# Patient Record
Sex: FEMALE | Employment: FULL TIME | URBAN - METROPOLITAN AREA
[De-identification: names, ages, dates, MRNs, and addresses within clinical notes are randomized per-mention and may not be internally consistent; named-entity substitution may affect disease eponyms.]

---

## 2023-10-01 ENCOUNTER — HOSPITAL ENCOUNTER (INPATIENT)
Facility: HOSPITAL | Age: 38
LOS: 9 days | Discharge: HOME | DRG: 022 | End: 2023-10-10
Attending: STUDENT IN AN ORGANIZED HEALTH CARE EDUCATION/TRAINING PROGRAM | Admitting: NEUROLOGICAL SURGERY
Payer: COMMERCIAL

## 2023-10-01 ENCOUNTER — APPOINTMENT (OUTPATIENT)
Dept: RADIOLOGY | Facility: HOSPITAL | Age: 38
DRG: 022 | End: 2023-10-01
Payer: COMMERCIAL

## 2023-10-01 DIAGNOSIS — I68.8 OTHER CEREBROVASCULAR DISORDERS IN DISEASES CLASSIFIED ELSEWHERE: ICD-10-CM

## 2023-10-01 DIAGNOSIS — I67.1 CEREBRAL ANEURYSM (HHS-HCC): ICD-10-CM

## 2023-10-01 DIAGNOSIS — I60.8 OTHER NONTRAUMATIC SUBARACHNOID HEMORRHAGE (MULTI): ICD-10-CM

## 2023-10-01 DIAGNOSIS — I60.9 SUBARACHNOID HEMORRHAGE (MULTI): Primary | ICD-10-CM

## 2023-10-01 LAB
ALBUMIN SERPL BCP-MCNC: 4.1 G/DL (ref 3.4–5)
ALP SERPL-CCNC: 44 U/L (ref 33–110)
ALT SERPL W P-5'-P-CCNC: 13 U/L (ref 7–45)
ANION GAP BLDV CALCULATED.4IONS-SCNC: 8 MMOL/L (ref 10–25)
ANION GAP SERPL CALC-SCNC: 15 MMOL/L (ref 10–20)
APPEARANCE UR: ABNORMAL
APTT PPP: 31 SECONDS (ref 27–38)
AST SERPL W P-5'-P-CCNC: 15 U/L (ref 9–39)
B-HCG SERPL-ACNC: <3 MIU/ML
BASE EXCESS BLDV CALC-SCNC: 1.3 MMOL/L (ref -2–3)
BASOPHILS # BLD AUTO: 0.06 X10*3/UL (ref 0–0.1)
BASOPHILS NFR BLD AUTO: 0.4 %
BILIRUB SERPL-MCNC: 0.5 MG/DL (ref 0–1.2)
BILIRUB UR STRIP.AUTO-MCNC: NEGATIVE MG/DL
BNP SERPL-MCNC: 7 PG/ML (ref 0–99)
BODY TEMPERATURE: 37 DEGREES CELSIUS
BUN SERPL-MCNC: 12 MG/DL (ref 6–23)
CA-I BLDV-SCNC: 1.16 MMOL/L (ref 1.1–1.33)
CALCIUM SERPL-MCNC: 8.9 MG/DL (ref 8.6–10.6)
CARDIAC TROPONIN I PNL SERPL HS: 4 NG/L (ref 0–34)
CHLORIDE BLDV-SCNC: 112 MMOL/L (ref 98–107)
CHLORIDE SERPL-SCNC: 114 MMOL/L (ref 98–107)
CHOLEST SERPL-MCNC: 166 MG/DL (ref 0–199)
CHOLESTEROL/HDL RATIO: 4.9
CO2 SERPL-SCNC: 22 MMOL/L (ref 21–32)
COLOR UR: YELLOW
CREAT SERPL-MCNC: 0.75 MG/DL (ref 0.5–1.05)
EOSINOPHIL # BLD AUTO: 0.05 X10*3/UL (ref 0–0.7)
EOSINOPHIL NFR BLD AUTO: 0.4 %
ERYTHROCYTE [DISTWIDTH] IN BLOOD BY AUTOMATED COUNT: 12.8 % (ref 11.5–14.5)
EST. AVERAGE GLUCOSE BLD GHB EST-MCNC: 97 MG/DL
GFR SERPL CREATININE-BSD FRML MDRD: >90 ML/MIN/1.73M*2
GLUCOSE BLDV-MCNC: 104 MG/DL (ref 74–99)
GLUCOSE SERPL-MCNC: 93 MG/DL (ref 74–99)
GLUCOSE UR STRIP.AUTO-MCNC: NEGATIVE MG/DL
HBA1C MFR BLD: 5 %
HCO3 BLDV-SCNC: 25.9 MMOL/L (ref 22–26)
HCT VFR BLD AUTO: 39.8 % (ref 36–46)
HCT VFR BLD EST: 44 % (ref 36–46)
HDLC SERPL-MCNC: 34.1 MG/DL
HGB BLD-MCNC: 13.7 G/DL (ref 12–16)
HGB BLDV-MCNC: 14.5 G/DL (ref 12–16)
IMM GRANULOCYTES # BLD AUTO: 0.06 X10*3/UL (ref 0–0.7)
IMM GRANULOCYTES NFR BLD AUTO: 0.4 % (ref 0–0.9)
INR PPP: 1.2 (ref 0.9–1.1)
KETONES UR STRIP.AUTO-MCNC: ABNORMAL MG/DL
LACTATE BLDV-SCNC: 0.7 MMOL/L (ref 0.4–2)
LEUKOCYTE ESTERASE UR QL STRIP.AUTO: ABNORMAL
LYMPHOCYTES # BLD AUTO: 3.14 X10*3/UL (ref 1.2–4.8)
LYMPHOCYTES NFR BLD AUTO: 22.1 %
MAGNESIUM SERPL-MCNC: 1.9 MG/DL (ref 1.6–2.4)
MCH RBC QN AUTO: 30.9 PG (ref 26–34)
MCHC RBC AUTO-ENTMCNC: 34.4 G/DL (ref 32–36)
MCV RBC AUTO: 90 FL (ref 80–100)
MONOCYTES # BLD AUTO: 0.84 X10*3/UL (ref 0.1–1)
MONOCYTES NFR BLD AUTO: 5.9 %
MUCOUS THREADS #/AREA URNS AUTO: NORMAL /LPF
NEUTROPHILS # BLD AUTO: 10.05 X10*3/UL (ref 1.2–7.7)
NEUTROPHILS NFR BLD AUTO: 70.8 %
NITRITE UR QL STRIP.AUTO: NEGATIVE
NON-HDL CHOLESTEROL: 132 MG/DL (ref 0–149)
NRBC BLD-RTO: 0 /100 WBCS (ref 0–0)
OXYHGB MFR BLDV: 68.8 % (ref 45–75)
PCO2 BLDV: 40 MM HG (ref 41–51)
PH BLDV: 7.42 PH (ref 7.33–7.43)
PH UR STRIP.AUTO: 6 [PH]
PLATELET # BLD AUTO: 281 X10*3/UL (ref 150–450)
PMV BLD AUTO: 9.6 FL (ref 7.5–11.5)
PO2 BLDV: 43 MM HG (ref 35–45)
POTASSIUM BLDV-SCNC: 3.7 MMOL/L (ref 3.5–5.3)
POTASSIUM SERPL-SCNC: 3.8 MMOL/L (ref 3.5–5.3)
PROT SERPL-MCNC: 6.9 G/DL (ref 6.4–8.2)
PROT UR STRIP.AUTO-MCNC: NEGATIVE MG/DL
PROTHROMBIN TIME: 13.1 SECONDS (ref 9.8–12.8)
RBC # BLD AUTO: 4.44 X10*6/UL (ref 4–5.2)
RBC # UR STRIP.AUTO: NEGATIVE /UL
RBC #/AREA URNS AUTO: NORMAL /HPF
SAO2 % BLDV: 74 % (ref 45–75)
SODIUM BLDV-SCNC: 142 MMOL/L (ref 136–145)
SODIUM SERPL-SCNC: 147 MMOL/L (ref 136–145)
SP GR UR STRIP.AUTO: >1.06
SQUAMOUS #/AREA URNS AUTO: NORMAL /HPF
UROBILINOGEN UR STRIP.AUTO-MCNC: <2 MG/DL
WBC # BLD AUTO: 14.2 X10*3/UL (ref 4.4–11.3)
WBC #/AREA URNS AUTO: NORMAL /HPF

## 2023-10-01 PROCEDURE — 81001 URINALYSIS AUTO W/SCOPE: CPT | Performed by: STUDENT IN AN ORGANIZED HEALTH CARE EDUCATION/TRAINING PROGRAM

## 2023-10-01 PROCEDURE — 83880 ASSAY OF NATRIURETIC PEPTIDE: CPT | Performed by: STUDENT IN AN ORGANIZED HEALTH CARE EDUCATION/TRAINING PROGRAM

## 2023-10-01 PROCEDURE — 82465 ASSAY BLD/SERUM CHOLESTEROL: CPT | Performed by: STUDENT IN AN ORGANIZED HEALTH CARE EDUCATION/TRAINING PROGRAM

## 2023-10-01 PROCEDURE — 71045 X-RAY EXAM CHEST 1 VIEW: CPT | Performed by: RADIOLOGY

## 2023-10-01 PROCEDURE — 2500000005 HC RX 250 GENERAL PHARMACY W/O HCPCS

## 2023-10-01 PROCEDURE — G1004 CDSM NDSC: HCPCS

## 2023-10-01 PROCEDURE — 83036 HEMOGLOBIN GLYCOSYLATED A1C: CPT | Performed by: STUDENT IN AN ORGANIZED HEALTH CARE EDUCATION/TRAINING PROGRAM

## 2023-10-01 PROCEDURE — 85025 COMPLETE CBC W/AUTO DIFF WBC: CPT | Performed by: STUDENT IN AN ORGANIZED HEALTH CARE EDUCATION/TRAINING PROGRAM

## 2023-10-01 PROCEDURE — 80053 COMPREHEN METABOLIC PANEL: CPT

## 2023-10-01 PROCEDURE — 99284 EMERGENCY DEPT VISIT MOD MDM: CPT | Performed by: STUDENT IN AN ORGANIZED HEALTH CARE EDUCATION/TRAINING PROGRAM

## 2023-10-01 PROCEDURE — 2500000004 HC RX 250 GENERAL PHARMACY W/ HCPCS (ALT 636 FOR OP/ED): Performed by: STUDENT IN AN ORGANIZED HEALTH CARE EDUCATION/TRAINING PROGRAM

## 2023-10-01 PROCEDURE — 83735 ASSAY OF MAGNESIUM: CPT | Performed by: STUDENT IN AN ORGANIZED HEALTH CARE EDUCATION/TRAINING PROGRAM

## 2023-10-01 PROCEDURE — 36415 COLL VENOUS BLD VENIPUNCTURE: CPT | Performed by: STUDENT IN AN ORGANIZED HEALTH CARE EDUCATION/TRAINING PROGRAM

## 2023-10-01 PROCEDURE — 87086 URINE CULTURE/COLONY COUNT: CPT | Performed by: STUDENT IN AN ORGANIZED HEALTH CARE EDUCATION/TRAINING PROGRAM

## 2023-10-01 PROCEDURE — 85610 PROTHROMBIN TIME: CPT | Performed by: STUDENT IN AN ORGANIZED HEALTH CARE EDUCATION/TRAINING PROGRAM

## 2023-10-01 PROCEDURE — 81003 URINALYSIS AUTO W/O SCOPE: CPT | Performed by: STUDENT IN AN ORGANIZED HEALTH CARE EDUCATION/TRAINING PROGRAM

## 2023-10-01 PROCEDURE — 99285 EMERGENCY DEPT VISIT HI MDM: CPT | Mod: 25 | Performed by: STUDENT IN AN ORGANIZED HEALTH CARE EDUCATION/TRAINING PROGRAM

## 2023-10-01 PROCEDURE — 83605 ASSAY OF LACTIC ACID: CPT

## 2023-10-01 PROCEDURE — 99223 1ST HOSP IP/OBS HIGH 75: CPT | Performed by: NEUROLOGICAL SURGERY

## 2023-10-01 PROCEDURE — 86850 RBC ANTIBODY SCREEN: CPT | Performed by: STUDENT IN AN ORGANIZED HEALTH CARE EDUCATION/TRAINING PROGRAM

## 2023-10-01 PROCEDURE — 82947 ASSAY GLUCOSE BLOOD QUANT: CPT

## 2023-10-01 PROCEDURE — 85730 THROMBOPLASTIN TIME PARTIAL: CPT | Performed by: STUDENT IN AN ORGANIZED HEALTH CARE EDUCATION/TRAINING PROGRAM

## 2023-10-01 PROCEDURE — 82947 ASSAY GLUCOSE BLOOD QUANT: CPT | Performed by: STUDENT IN AN ORGANIZED HEALTH CARE EDUCATION/TRAINING PROGRAM

## 2023-10-01 PROCEDURE — 84702 CHORIONIC GONADOTROPIN TEST: CPT | Performed by: STUDENT IN AN ORGANIZED HEALTH CARE EDUCATION/TRAINING PROGRAM

## 2023-10-01 PROCEDURE — 2500000001 HC RX 250 WO HCPCS SELF ADMINISTERED DRUGS (ALT 637 FOR MEDICARE OP): Performed by: STUDENT IN AN ORGANIZED HEALTH CARE EDUCATION/TRAINING PROGRAM

## 2023-10-01 PROCEDURE — 84484 ASSAY OF TROPONIN QUANT: CPT | Performed by: STUDENT IN AN ORGANIZED HEALTH CARE EDUCATION/TRAINING PROGRAM

## 2023-10-01 PROCEDURE — 2550000001 HC RX 255 CONTRASTS: Performed by: STUDENT IN AN ORGANIZED HEALTH CARE EDUCATION/TRAINING PROGRAM

## 2023-10-01 PROCEDURE — 70498 CT ANGIOGRAPHY NECK: CPT | Performed by: STUDENT IN AN ORGANIZED HEALTH CARE EDUCATION/TRAINING PROGRAM

## 2023-10-01 PROCEDURE — 99285 EMERGENCY DEPT VISIT HI MDM: CPT | Performed by: STUDENT IN AN ORGANIZED HEALTH CARE EDUCATION/TRAINING PROGRAM

## 2023-10-01 PROCEDURE — 2020000001 HC ICU ROOM DAILY

## 2023-10-01 PROCEDURE — 70496 CT ANGIOGRAPHY HEAD: CPT | Performed by: STUDENT IN AN ORGANIZED HEALTH CARE EDUCATION/TRAINING PROGRAM

## 2023-10-01 RX ORDER — HYDRALAZINE HYDROCHLORIDE 20 MG/ML
10 INJECTION INTRAMUSCULAR; INTRAVENOUS
Status: DISCONTINUED | OUTPATIENT
Start: 2023-10-01 | End: 2023-10-02

## 2023-10-01 RX ORDER — DEXAMETHASONE SODIUM PHOSPHATE 4 MG/ML
2 INJECTION, SOLUTION INTRA-ARTICULAR; INTRALESIONAL; INTRAMUSCULAR; INTRAVENOUS; SOFT TISSUE EVERY 8 HOURS SCHEDULED
Status: COMPLETED | OUTPATIENT
Start: 2023-10-01 | End: 2023-10-04

## 2023-10-01 RX ORDER — AMOXICILLIN 250 MG
2 CAPSULE ORAL 2 TIMES DAILY
Status: DISCONTINUED | OUTPATIENT
Start: 2023-10-01 | End: 2023-10-10

## 2023-10-01 RX ORDER — POLYETHYLENE GLYCOL 3350 17 G/17G
17 POWDER, FOR SOLUTION ORAL DAILY
Status: DISCONTINUED | OUTPATIENT
Start: 2023-10-01 | End: 2023-10-10

## 2023-10-01 RX ORDER — NICARDIPINE HYDROCHLORIDE 0.2 MG/ML
1-15 INJECTION INTRAVENOUS CONTINUOUS PRN
Status: DISCONTINUED | OUTPATIENT
Start: 2023-10-01 | End: 2023-10-02

## 2023-10-01 RX ORDER — OXYCODONE HYDROCHLORIDE 5 MG/1
5 TABLET ORAL EVERY 6 HOURS PRN
Status: DISCONTINUED | OUTPATIENT
Start: 2023-10-01 | End: 2023-10-08

## 2023-10-01 RX ORDER — ACETAMINOPHEN 325 MG/1
650 TABLET ORAL 4 TIMES DAILY
Status: DISCONTINUED | OUTPATIENT
Start: 2023-10-01 | End: 2023-10-08

## 2023-10-01 RX ORDER — LIDOCAINE HYDROCHLORIDE 10 MG/ML
INJECTION INFILTRATION; PERINEURAL
Status: COMPLETED
Start: 2023-10-01 | End: 2023-10-01

## 2023-10-01 RX ORDER — NIMODIPINE 30 MG/1
60 CAPSULE, LIQUID FILLED ORAL EVERY 4 HOURS
Status: DISCONTINUED | OUTPATIENT
Start: 2023-10-01 | End: 2023-10-10 | Stop reason: HOSPADM

## 2023-10-01 RX ORDER — LEVETIRACETAM 5 MG/ML
500 INJECTION INTRAVASCULAR EVERY 12 HOURS
Status: DISCONTINUED | OUTPATIENT
Start: 2023-10-01 | End: 2023-10-02

## 2023-10-01 RX ORDER — TRANEXAMIC ACID 10 MG/ML
1000 INJECTION, SOLUTION INTRAVENOUS EVERY 8 HOURS
Status: COMPLETED | OUTPATIENT
Start: 2023-10-01 | End: 2023-10-02

## 2023-10-01 RX ORDER — LABETALOL HYDROCHLORIDE 5 MG/ML
10 INJECTION, SOLUTION INTRAVENOUS EVERY 10 MIN PRN
Status: DISCONTINUED | OUTPATIENT
Start: 2023-10-01 | End: 2023-10-02

## 2023-10-01 RX ADMIN — LEVETIRACETAM 500 MG: 5 INJECTION INTRAVENOUS at 17:15

## 2023-10-01 RX ADMIN — NIMODIPINE 60 MG: 30 CAPSULE, LIQUID FILLED ORAL at 23:08

## 2023-10-01 RX ADMIN — TRANEXAMIC ACID 1000 MG: 10 INJECTION, SOLUTION INTRAVENOUS at 20:04

## 2023-10-01 RX ADMIN — ACETAMINOPHEN 650 MG: 325 TABLET, FILM COATED ORAL at 17:35

## 2023-10-01 RX ADMIN — DEXAMETHASONE SODIUM PHOSPHATE 2 MG: 4 INJECTION, SOLUTION INTRAMUSCULAR; INTRAVENOUS at 22:15

## 2023-10-01 RX ADMIN — IOHEXOL 100 ML: 350 INJECTION, SOLUTION INTRAVENOUS at 14:52

## 2023-10-01 RX ADMIN — LIDOCAINE HYDROCHLORIDE: 10 INJECTION, SOLUTION INFILTRATION; PERINEURAL at 20:15

## 2023-10-01 RX ADMIN — NIMODIPINE 60 MG: 60 SOLUTION ORAL at 17:00

## 2023-10-01 RX ADMIN — ACETAMINOPHEN 650 MG: 325 TABLET, FILM COATED ORAL at 20:30

## 2023-10-01 RX ADMIN — NIMODIPINE 60 MG: 30 CAPSULE, LIQUID FILLED ORAL at 20:04

## 2023-10-01 RX ADMIN — DEXAMETHASONE SODIUM PHOSPHATE 2 MG: 4 INJECTION, SOLUTION INTRAMUSCULAR; INTRAVENOUS at 17:16

## 2023-10-01 RX ADMIN — OXYCODONE HYDROCHLORIDE 5 MG: 5 TABLET ORAL at 20:31

## 2023-10-01 RX ADMIN — SENNOSIDES AND DOCUSATE SODIUM 2 TABLET: 50; 8.6 TABLET ORAL at 20:32

## 2023-10-01 SDOH — SOCIAL STABILITY: SOCIAL INSECURITY: ARE YOU OR HAVE YOU BEEN THREATENED OR ABUSED PHYSICALLY, EMOTIONALLY, OR SEXUALLY BY ANYONE?: NO

## 2023-10-01 SDOH — SOCIAL STABILITY: SOCIAL INSECURITY: ABUSE: ADULT

## 2023-10-01 ASSESSMENT — COLUMBIA-SUICIDE SEVERITY RATING SCALE - C-SSRS
2. HAVE YOU ACTUALLY HAD ANY THOUGHTS OF KILLING YOURSELF?: NO
6. HAVE YOU EVER DONE ANYTHING, STARTED TO DO ANYTHING, OR PREPARED TO DO ANYTHING TO END YOUR LIFE?: NO
1. IN THE PAST MONTH, HAVE YOU WISHED YOU WERE DEAD OR WISHED YOU COULD GO TO SLEEP AND NOT WAKE UP?: NO

## 2023-10-01 ASSESSMENT — PAIN SCALES - GENERAL
PAINLEVEL_OUTOF10: 0 - NO PAIN
PAINLEVEL_OUTOF10: 10 - WORST POSSIBLE PAIN
PAINLEVEL_OUTOF10: 7
PAINLEVEL_OUTOF10: 7
PAINLEVEL_OUTOF10: 3
PAINLEVEL_OUTOF10: 10 - WORST POSSIBLE PAIN

## 2023-10-01 ASSESSMENT — PAIN SCALES - PAIN ASSESSMENT IN ADVANCED DEMENTIA (PAINAD)
CONSOLABILITY: DISTRACTED OR REASSURED BY VOICE/TOUCH
NEGVOCALIZATION: OCCASIONAL MOAN/GROAN, LOW SPEECH, NEGATIVE/DISAPPROVING QUALITY
FACIALEXPRESSION: SAD, FRIGHTENED, FROWN
CONSOLABILITY: DISTRACTED OR REASSURED BY VOICE/TOUCH
TOTALSCORE: 4
BODYLANGUAGE: TENSE, DISTRESSED PACING, FIDGETING
FACIALEXPRESSION: SAD, FRIGHTENED, FROWN
BREATHING: NORMAL
BODYLANGUAGE: TENSE, DISTRESSED PACING, FIDGETING
NEGVOCALIZATION: OCCASIONAL MOAN/GROAN, LOW SPEECH, NEGATIVE/DISAPPROVING QUALITY

## 2023-10-01 ASSESSMENT — ACTIVITIES OF DAILY LIVING (ADL)
DRESSING YOURSELF: INDEPENDENT
HEARING - LEFT EAR: FUNCTIONAL
ADEQUATE_TO_COMPLETE_ADL: YES
HEARING - RIGHT EAR: FUNCTIONAL
TOILETING: INDEPENDENT
PATIENT'S MEMORY ADEQUATE TO SAFELY COMPLETE DAILY ACTIVITIES?: YES
JUDGMENT_ADEQUATE_SAFELY_COMPLETE_DAILY_ACTIVITIES: YES
BATHING: INDEPENDENT
GROOMING: INDEPENDENT
WALKS IN HOME: INDEPENDENT
FEEDING YOURSELF: INDEPENDENT

## 2023-10-01 ASSESSMENT — PAIN - FUNCTIONAL ASSESSMENT
PAIN_FUNCTIONAL_ASSESSMENT: 0-10

## 2023-10-01 ASSESSMENT — PAIN SCALES - WONG BAKER
WONGBAKER_NUMERICALRESPONSE: HURTS EVEN MORE
WONGBAKER_NUMERICALRESPONSE: HURTS WORST

## 2023-10-01 ASSESSMENT — COGNITIVE AND FUNCTIONAL STATUS - GENERAL: PATIENT BASELINE BEDBOUND: NO

## 2023-10-01 ASSESSMENT — PAIN DESCRIPTION - PROGRESSION: CLINICAL_PROGRESSION: NOT CHANGED

## 2023-10-01 ASSESSMENT — PAIN DESCRIPTION - DESCRIPTORS: DESCRIPTORS: ACHING

## 2023-10-01 NOTE — Clinical Note
Cerebral angiogram performed. Access via right groin. Closure with 5 fr mynx. Hemostasis achieved. 2x2 and tegaderm placed. Dressing clean, dry, and intact. No hematoma. Pt received 1 mg versed and 50 mcg fentanyl IVP. Pt to keep right leg flat for 3 hours post deployment time. VSS. Pt transferred to NSU, NSU RN received report.

## 2023-10-01 NOTE — H&P
"History Of Present Illness  Reshma Landers is a 37 y.o. female presenting with 2 days HA and neck stiffness.     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    Past Medical History  She has no past medical history on file.    Surgical History  She has no past surgical history on file.     Social History  She has no history on file for tobacco use, alcohol use, and drug use.     Allergies  Bactrim [sulfamethoxazole-trimethoprim]    Medications  (Not in a hospital admission)      Review of Systems   All other systems reviewed and are negative.       Neurological Exam  Mental Status  Alert.    Cranial Nerves  CN III, IV, VI: Extraocular movements intact bilaterally. Pupils equal round and reactive to light bilaterally.      Awake, Ox3  Fcx4 5/5  SILT    Physical Exam  Constitutional:       Appearance: Normal appearance.   HENT:      Mouth/Throat:      Mouth: Mucous membranes are moist.   Eyes:      Extraocular Movements: Extraocular movements intact.      Pupils: Pupils are equal, round, and reactive to light.   Cardiovascular:      Rate and Rhythm: Normal rate and regular rhythm.   Pulmonary:      Breath sounds: Normal breath sounds.   Abdominal:      General: Bowel sounds are normal.      Palpations: Abdomen is soft.   Musculoskeletal:         General: Normal range of motion.   Skin:     General: Skin is warm and dry.   Neurological:      Mental Status: She is alert.   Psychiatric:         Mood and Affect: Mood normal.         Behavior: Behavior normal.       Last Recorded Vitals  Blood pressure 122/78, pulse 80, temperature 36.8 °C (98.2 °F), resp. rate 16, height 1.6 m (5' 3\"), weight 61.2 kg (135 lb), SpO2 98 %.     SCORES    GCS:   - Motor: 6 - Follows simple motor commands  - Verbal: 5 - Alert and oriented  - Eyes: 4 - Opens eyes on own  - TOTAL: 15    2 - Moderate to severe headache, nuchal rigidity, no neurological deficit other than cranial " nerve palsy  mFS:  1 - thin SAH, no IVH    Assessment/Plan     Patient is a 37 year old female with h/o smoking, uncle  2/2 aneurysmal hemorrhage p/w 2 days headache and nuchal rigidity, CTH basal/Sylvian SAH, CTA 6mm basilar tip aneurysm.    NSU  SBP <140  Angio for coiling 10/2  TXA  TCDs  Nimodipine  Dex 2q8, keppra  CBC, RFP, coag, T+S, CXR, UA, EKG  SCDs    Delbert Brannon MD    Note authored by resident on neurosurgery team, with all questions or to contact team please page at [phone]

## 2023-10-01 NOTE — ED TRIAGE NOTES
Pt transferred from [facility] for subarachnoid Hemorrhage. Pt has had a headache since Friday that has gotten progressively worse.  Pain woke her from her sleep at 4am. Started on Nicardipine at OSH. Given 1L NS, 500 Keppra, 2mg decadron pta.

## 2023-10-01 NOTE — ED NOTES
"VSS. Patient states her headache is at a minimal and she doesn't want medication at this time. Able to ambulate with a standby assist and does not feel dizzy. States she \"feels ok\" at the moment. Patient and patient's visitor updated on plan of care.     Allison Feliz RN  10/01/23 4807    "

## 2023-10-01 NOTE — ED NOTES
Patient to the floor with all belongings accompanied by RN and medic     Allison Feliz RN  10/01/23 7904

## 2023-10-02 ENCOUNTER — APPOINTMENT (OUTPATIENT)
Dept: VASCULAR MEDICINE | Facility: HOSPITAL | Age: 38
DRG: 022 | End: 2023-10-02
Payer: COMMERCIAL

## 2023-10-02 ENCOUNTER — ANESTHESIA EVENT (OUTPATIENT)
Dept: RADIOLOGY | Facility: HOSPITAL | Age: 38
DRG: 022 | End: 2023-10-02
Payer: COMMERCIAL

## 2023-10-02 ENCOUNTER — ANESTHESIA (OUTPATIENT)
Dept: RADIOLOGY | Facility: HOSPITAL | Age: 38
DRG: 022 | End: 2023-10-02
Payer: COMMERCIAL

## 2023-10-02 ENCOUNTER — HOSPITAL ENCOUNTER (OUTPATIENT)
Dept: RADIOLOGY | Facility: HOSPITAL | Age: 38
Discharge: HOME | DRG: 022 | End: 2023-10-02
Payer: COMMERCIAL

## 2023-10-02 PROBLEM — B99.9 INFECTION REQUIRING CONTACT ISOLATION PRECAUTIONS: Status: ACTIVE | Noted: 2023-10-02

## 2023-10-02 PROBLEM — R56.9 SEIZURES (MULTI): Status: ACTIVE | Noted: 2023-10-02

## 2023-10-02 PROBLEM — Z86.69 HISTORY OF MIGRAINE HEADACHES: Status: ACTIVE | Noted: 2023-10-02

## 2023-10-02 PROBLEM — Z21 HIV (HUMAN IMMUNODEFICIENCY VIRUS INFECTION): Status: ACTIVE | Noted: 2023-10-02

## 2023-10-02 PROBLEM — I67.1 CEREBRAL ANEURYSM (HHS-HCC): Status: ACTIVE | Noted: 2023-10-02

## 2023-10-02 PROBLEM — I77.9 CAROTID ARTERY DISEASE (CMS-HCC): Status: ACTIVE | Noted: 2023-10-02

## 2023-10-02 LAB
ABO GROUP (TYPE) IN BLOOD: NORMAL
ABO GROUP (TYPE) IN BLOOD: NORMAL
ALBUMIN SERPL BCP-MCNC: 4.2 G/DL (ref 3.4–5)
ANION GAP SERPL CALC-SCNC: 15 MMOL/L (ref 10–20)
ANTIBODY SCREEN: NORMAL
APTT PPP: 30 SECONDS (ref 27–38)
BUN SERPL-MCNC: 15 MG/DL (ref 6–23)
CALCIUM SERPL-MCNC: 9.6 MG/DL (ref 8.6–10.6)
CHLORIDE SERPL-SCNC: 110 MMOL/L (ref 98–107)
CO2 SERPL-SCNC: 23 MMOL/L (ref 21–32)
CREAT SERPL-MCNC: 0.67 MG/DL (ref 0.5–1.05)
ERYTHROCYTE [DISTWIDTH] IN BLOOD BY AUTOMATED COUNT: 12.8 % (ref 11.5–14.5)
GFR SERPL CREATININE-BSD FRML MDRD: >90 ML/MIN/1.73M*2
GLUCOSE BLD MANUAL STRIP-MCNC: 77 MG/DL (ref 74–99)
GLUCOSE SERPL-MCNC: 103 MG/DL (ref 74–99)
HCT VFR BLD AUTO: 38.9 % (ref 36–46)
HGB BLD-MCNC: 12.9 G/DL (ref 12–16)
INR PPP: 1.1 (ref 0.9–1.1)
MCH RBC QN AUTO: 30.5 PG (ref 26–34)
MCHC RBC AUTO-ENTMCNC: 33.2 G/DL (ref 32–36)
MCV RBC AUTO: 92 FL (ref 80–100)
NRBC BLD-RTO: 0 /100 WBCS (ref 0–0)
PHOSPHATE SERPL-MCNC: 4.4 MG/DL (ref 2.5–4.9)
PLATELET # BLD AUTO: 296 X10*3/UL (ref 150–450)
PMV BLD AUTO: 9.4 FL (ref 7.5–11.5)
POTASSIUM SERPL-SCNC: 4.4 MMOL/L (ref 3.5–5.3)
PROTHROMBIN TIME: 12.8 SECONDS (ref 9.8–12.8)
RBC # BLD AUTO: 4.23 X10*6/UL (ref 4–5.2)
RH FACTOR (ANTIGEN D): NORMAL
RH FACTOR (ANTIGEN D): NORMAL
SODIUM SERPL-SCNC: 144 MMOL/L (ref 136–145)
WBC # BLD AUTO: 14.3 X10*3/UL (ref 4.4–11.3)

## 2023-10-02 PROCEDURE — 76377 3D RENDER W/INTRP POSTPROCES: CPT

## 2023-10-02 PROCEDURE — C1769 GUIDE WIRE: HCPCS

## 2023-10-02 PROCEDURE — A36245 PR PLACE CATH SELECT ART,ABD/PEL: Performed by: ANESTHESIOLOGY

## 2023-10-02 PROCEDURE — 2720000007 HC OR 272 NO HCPCS

## 2023-10-02 PROCEDURE — 2020000001 HC ICU ROOM DAILY

## 2023-10-02 PROCEDURE — 85347 COAGULATION TIME ACTIVATED: CPT

## 2023-10-02 PROCEDURE — 85027 COMPLETE CBC AUTOMATED: CPT | Performed by: STUDENT IN AN ORGANIZED HEALTH CARE EDUCATION/TRAINING PROGRAM

## 2023-10-02 PROCEDURE — 2500000004 HC RX 250 GENERAL PHARMACY W/ HCPCS (ALT 636 FOR OP/ED): Performed by: STUDENT IN AN ORGANIZED HEALTH CARE EDUCATION/TRAINING PROGRAM

## 2023-10-02 PROCEDURE — 93886 INTRACRANIAL COMPLETE STUDY: CPT | Performed by: PSYCHIATRY & NEUROLOGY

## 2023-10-02 PROCEDURE — 2500000001 HC RX 250 WO HCPCS SELF ADMINISTERED DRUGS (ALT 637 FOR MEDICARE OP): Performed by: STUDENT IN AN ORGANIZED HEALTH CARE EDUCATION/TRAINING PROGRAM

## 2023-10-02 PROCEDURE — ANE737 PERIPHERAL IV: Performed by: NURSE ANESTHETIST, CERTIFIED REGISTERED

## 2023-10-02 PROCEDURE — 75710 ARTERY X-RAYS ARM/LEG: CPT

## 2023-10-02 PROCEDURE — 2580000001 HC RX 258 IV SOLUTIONS: Performed by: NURSE ANESTHETIST, CERTIFIED REGISTERED

## 2023-10-02 PROCEDURE — 82947 ASSAY GLUCOSE BLOOD QUANT: CPT

## 2023-10-02 PROCEDURE — 85610 PROTHROMBIN TIME: CPT | Performed by: STUDENT IN AN ORGANIZED HEALTH CARE EDUCATION/TRAINING PROGRAM

## 2023-10-02 PROCEDURE — A36245 PR PLACE CATH SELECT ART,ABD/PEL: Performed by: NURSE ANESTHETIST, CERTIFIED REGISTERED

## 2023-10-02 PROCEDURE — 3700000002 HC GENERAL ANESTHESIA TIME - EACH INCREMENTAL 1 MINUTE

## 2023-10-02 PROCEDURE — 61624 TCAT PERM OCCLS/EMBOLJ CNS: CPT | Performed by: NEUROLOGICAL SURGERY

## 2023-10-02 PROCEDURE — 36226 PLACE CATH VERTEBRAL ART: CPT

## 2023-10-02 PROCEDURE — C1889 IMPLANT/INSERT DEVICE, NOC: HCPCS

## 2023-10-02 PROCEDURE — C1760 CLOSURE DEV, VASC: HCPCS

## 2023-10-02 PROCEDURE — 2500000004 HC RX 250 GENERAL PHARMACY W/ HCPCS (ALT 636 FOR OP/ED): Performed by: NURSE ANESTHETIST, CERTIFIED REGISTERED

## 2023-10-02 PROCEDURE — 76377 3D RENDER W/INTRP POSTPROCES: CPT | Performed by: NEUROLOGICAL SURGERY

## 2023-10-02 PROCEDURE — 03LG3DZ OCCLUSION OF INTRACRANIAL ARTERY WITH INTRALUMINAL DEVICE, PERCUTANEOUS APPROACH: ICD-10-PCS | Performed by: NEUROLOGICAL SURGERY

## 2023-10-02 PROCEDURE — ANE737: Performed by: NURSE ANESTHETIST, CERTIFIED REGISTERED

## 2023-10-02 PROCEDURE — C1887 CATHETER, GUIDING: HCPCS

## 2023-10-02 PROCEDURE — 36620 INSERTION CATHETER ARTERY: CPT | Performed by: NURSE ANESTHETIST, CERTIFIED REGISTERED

## 2023-10-02 PROCEDURE — 75894 X-RAYS TRANSCATH THERAPY: CPT | Performed by: NEUROLOGICAL SURGERY

## 2023-10-02 PROCEDURE — 93886 INTRACRANIAL COMPLETE STUDY: CPT

## 2023-10-02 PROCEDURE — 75898 FOLLOW-UP ANGIOGRAPHY: CPT | Performed by: NEUROLOGICAL SURGERY

## 2023-10-02 PROCEDURE — 80069 RENAL FUNCTION PANEL: CPT | Performed by: STUDENT IN AN ORGANIZED HEALTH CARE EDUCATION/TRAINING PROGRAM

## 2023-10-02 PROCEDURE — 99291 CRITICAL CARE FIRST HOUR: CPT | Performed by: NEUROLOGICAL SURGERY

## 2023-10-02 PROCEDURE — 2500000005 HC RX 250 GENERAL PHARMACY W/O HCPCS: Performed by: NURSE ANESTHETIST, CERTIFIED REGISTERED

## 2023-10-02 PROCEDURE — 3700000001 HC GENERAL ANESTHESIA TIME - INITIAL BASE CHARGE

## 2023-10-02 PROCEDURE — C2628 CATHETER, OCCLUSION: HCPCS

## 2023-10-02 PROCEDURE — 36415 COLL VENOUS BLD VENIPUNCTURE: CPT | Performed by: NEUROLOGICAL SURGERY

## 2023-10-02 PROCEDURE — 2780000003 HC OR 278 NO HCPCS

## 2023-10-02 PROCEDURE — 75710 ARTERY X-RAYS ARM/LEG: CPT | Mod: RT

## 2023-10-02 PROCEDURE — 36415 COLL VENOUS BLD VENIPUNCTURE: CPT | Performed by: STUDENT IN AN ORGANIZED HEALTH CARE EDUCATION/TRAINING PROGRAM

## 2023-10-02 RX ORDER — HYDRALAZINE HYDROCHLORIDE 20 MG/ML
10 INJECTION INTRAMUSCULAR; INTRAVENOUS EVERY 4 HOURS PRN
Status: DISCONTINUED | OUTPATIENT
Start: 2023-10-02 | End: 2023-10-10

## 2023-10-02 RX ORDER — PROPOFOL 10 MG/ML
INJECTION, EMULSION INTRAVENOUS AS NEEDED
Status: DISCONTINUED | OUTPATIENT
Start: 2023-10-02 | End: 2023-10-02

## 2023-10-02 RX ORDER — MAGNESIUM SULFATE HEPTAHYDRATE 40 MG/ML
2 INJECTION, SOLUTION INTRAVENOUS EVERY 6 HOURS PRN
Status: DISCONTINUED | OUTPATIENT
Start: 2023-10-02 | End: 2023-10-10

## 2023-10-02 RX ORDER — FENTANYL CITRATE 50 UG/ML
INJECTION, SOLUTION INTRAMUSCULAR; INTRAVENOUS AS NEEDED
Status: DISCONTINUED | OUTPATIENT
Start: 2023-10-02 | End: 2023-10-02

## 2023-10-02 RX ORDER — ROCURONIUM BROMIDE 10 MG/ML
INJECTION, SOLUTION INTRAVENOUS AS NEEDED
Status: DISCONTINUED | OUTPATIENT
Start: 2023-10-02 | End: 2023-10-02

## 2023-10-02 RX ORDER — POTASSIUM CHLORIDE 20 MEQ/1
20 TABLET, EXTENDED RELEASE ORAL EVERY 6 HOURS PRN
Status: DISCONTINUED | OUTPATIENT
Start: 2023-10-02 | End: 2023-10-10

## 2023-10-02 RX ORDER — POTASSIUM CHLORIDE 1.5 G/1.58G
40 POWDER, FOR SOLUTION ORAL EVERY 6 HOURS PRN
Status: DISCONTINUED | OUTPATIENT
Start: 2023-10-02 | End: 2023-10-10

## 2023-10-02 RX ORDER — POTASSIUM CHLORIDE 20 MEQ/1
40 TABLET, EXTENDED RELEASE ORAL EVERY 6 HOURS PRN
Status: DISCONTINUED | OUTPATIENT
Start: 2023-10-02 | End: 2023-10-10

## 2023-10-02 RX ORDER — LIDOCAINE HYDROCHLORIDE 20 MG/ML
INJECTION, SOLUTION INFILTRATION; PERINEURAL AS NEEDED
Status: DISCONTINUED | OUTPATIENT
Start: 2023-10-02 | End: 2023-10-02

## 2023-10-02 RX ORDER — NITROGLYCERIN 20 MG/100ML
INJECTION INTRAVENOUS AS NEEDED
Status: DISCONTINUED | OUTPATIENT
Start: 2023-10-02 | End: 2023-10-02

## 2023-10-02 RX ORDER — DEXTROSE MONOHYDRATE 100 MG/ML
0.3 INJECTION, SOLUTION INTRAVENOUS ONCE AS NEEDED
Status: DISCONTINUED | OUTPATIENT
Start: 2023-10-02 | End: 2023-10-10

## 2023-10-02 RX ORDER — LABETALOL HYDROCHLORIDE 5 MG/ML
20 INJECTION, SOLUTION INTRAVENOUS EVERY 10 MIN PRN
Status: DISCONTINUED | OUTPATIENT
Start: 2023-10-02 | End: 2023-10-10

## 2023-10-02 RX ORDER — POTASSIUM CHLORIDE 14.9 MG/ML
20 INJECTION INTRAVENOUS EVERY 6 HOURS PRN
Status: DISCONTINUED | OUTPATIENT
Start: 2023-10-02 | End: 2023-10-10

## 2023-10-02 RX ORDER — INSULIN LISPRO 100 [IU]/ML
0-10 INJECTION, SOLUTION INTRAVENOUS; SUBCUTANEOUS
Status: DISCONTINUED | OUTPATIENT
Start: 2023-10-02 | End: 2023-10-06

## 2023-10-02 RX ORDER — PHENYLEPHRINE HCL IN 0.9% NACL 0.4MG/10ML
SYRINGE (ML) INTRAVENOUS AS NEEDED
Status: DISCONTINUED | OUTPATIENT
Start: 2023-10-02 | End: 2023-10-02

## 2023-10-02 RX ORDER — HEPARIN SODIUM 1000 [USP'U]/ML
INJECTION, SOLUTION INTRAVENOUS; SUBCUTANEOUS AS NEEDED
Status: DISCONTINUED | OUTPATIENT
Start: 2023-10-02 | End: 2023-10-02

## 2023-10-02 RX ORDER — POTASSIUM CHLORIDE 1.5 G/1.58G
20 POWDER, FOR SOLUTION ORAL EVERY 6 HOURS PRN
Status: DISCONTINUED | OUTPATIENT
Start: 2023-10-02 | End: 2023-10-10

## 2023-10-02 RX ORDER — PANTOPRAZOLE SODIUM 20 MG/1
20 TABLET, DELAYED RELEASE ORAL
Status: DISCONTINUED | OUTPATIENT
Start: 2023-10-03 | End: 2023-10-05

## 2023-10-02 RX ORDER — DEXTROSE 50 % IN WATER (D50W) INTRAVENOUS SYRINGE
25
Status: DISCONTINUED | OUTPATIENT
Start: 2023-10-02 | End: 2023-10-10

## 2023-10-02 RX ORDER — ONDANSETRON HYDROCHLORIDE 2 MG/ML
INJECTION, SOLUTION INTRAVENOUS AS NEEDED
Status: DISCONTINUED | OUTPATIENT
Start: 2023-10-02 | End: 2023-10-02

## 2023-10-02 RX ADMIN — OXYCODONE HYDROCHLORIDE 5 MG: 5 TABLET ORAL at 08:35

## 2023-10-02 RX ADMIN — PROPOFOL 40 MG: 10 INJECTION, EMULSION INTRAVENOUS at 10:16

## 2023-10-02 RX ADMIN — LABETALOL HYDROCHLORIDE 10 MG: 5 INJECTION, SOLUTION INTRAVENOUS at 12:47

## 2023-10-02 RX ADMIN — LEVETIRACETAM 500 MG: 5 INJECTION INTRAVENOUS at 05:53

## 2023-10-02 RX ADMIN — TRANEXAMIC ACID 1000 MG: 10 INJECTION, SOLUTION INTRAVENOUS at 02:32

## 2023-10-02 RX ADMIN — ONDANSETRON 4 MG: 2 INJECTION INTRAMUSCULAR; INTRAVENOUS at 12:40

## 2023-10-02 RX ADMIN — FENTANYL CITRATE 100 MCG: 50 INJECTION, SOLUTION INTRAMUSCULAR; INTRAVENOUS at 09:44

## 2023-10-02 RX ADMIN — PROPOFOL 100 MG: 10 INJECTION, EMULSION INTRAVENOUS at 09:45

## 2023-10-02 RX ADMIN — NITROGLYCERIN 50 MCG: 20 INJECTION INTRAVENOUS at 12:49

## 2023-10-02 RX ADMIN — Medication 40 MCG: at 11:47

## 2023-10-02 RX ADMIN — DEXAMETHASONE SODIUM PHOSPHATE 2 MG: 4 INJECTION, SOLUTION INTRAMUSCULAR; INTRAVENOUS at 05:53

## 2023-10-02 RX ADMIN — ROCURONIUM BROMIDE 20 MG: 10 INJECTION, SOLUTION INTRAVENOUS at 11:55

## 2023-10-02 RX ADMIN — NIMODIPINE 60 MG: 60 SOLUTION ORAL at 03:35

## 2023-10-02 RX ADMIN — HEPARIN SODIUM 3000 UNITS: 1000 INJECTION, SOLUTION INTRAVENOUS; SUBCUTANEOUS at 12:05

## 2023-10-02 RX ADMIN — ACETAMINOPHEN 650 MG: 325 TABLET, FILM COATED ORAL at 06:36

## 2023-10-02 RX ADMIN — SENNOSIDES AND DOCUSATE SODIUM 2 TABLET: 50; 8.6 TABLET ORAL at 08:36

## 2023-10-02 RX ADMIN — OXYCODONE HYDROCHLORIDE 5 MG: 5 TABLET ORAL at 02:33

## 2023-10-02 RX ADMIN — SODIUM CHLORIDE, SODIUM LACTATE, POTASSIUM CHLORIDE, AND CALCIUM CHLORIDE: 600; 310; 30; 20 INJECTION, SOLUTION INTRAVENOUS at 09:40

## 2023-10-02 RX ADMIN — ROCURONIUM BROMIDE 10 MG: 10 INJECTION, SOLUTION INTRAVENOUS at 12:27

## 2023-10-02 RX ADMIN — PROPOFOL 20 MG: 10 INJECTION, EMULSION INTRAVENOUS at 12:08

## 2023-10-02 RX ADMIN — LIDOCAINE HYDROCHLORIDE 100 MG: 20 INJECTION, SOLUTION INFILTRATION; PERINEURAL at 09:44

## 2023-10-02 RX ADMIN — DEXAMETHASONE SODIUM PHOSPHATE 2 MG: 4 INJECTION, SOLUTION INTRAMUSCULAR; INTRAVENOUS at 22:41

## 2023-10-02 RX ADMIN — DEXAMETHASONE SODIUM PHOSPHATE 2 MG: 4 INJECTION, SOLUTION INTRAMUSCULAR; INTRAVENOUS at 14:09

## 2023-10-02 RX ADMIN — ACETAMINOPHEN 650 MG: 325 TABLET, FILM COATED ORAL at 14:08

## 2023-10-02 RX ADMIN — SENNOSIDES AND DOCUSATE SODIUM 2 TABLET: 50; 8.6 TABLET ORAL at 20:04

## 2023-10-02 RX ADMIN — SUGAMMADEX 200 MG: 100 INJECTION, SOLUTION INTRAVENOUS at 12:47

## 2023-10-02 RX ADMIN — LABETALOL HYDROCHLORIDE 10 MG: 5 INJECTION, SOLUTION INTRAVENOUS at 12:49

## 2023-10-02 RX ADMIN — Medication 40 MCG: at 11:55

## 2023-10-02 RX ADMIN — SODIUM CHLORIDE: 9 INJECTION, SOLUTION INTRAVENOUS at 10:05

## 2023-10-02 RX ADMIN — PROPOFOL 40 MG: 10 INJECTION, EMULSION INTRAVENOUS at 10:08

## 2023-10-02 RX ADMIN — POLYETHYLENE GLYCOL 3350 17 G: 17 POWDER, FOR SOLUTION ORAL at 08:36

## 2023-10-02 RX ADMIN — NIMODIPINE 60 MG: 30 CAPSULE, LIQUID FILLED ORAL at 17:15

## 2023-10-02 RX ADMIN — ROCURONIUM BROMIDE 20 MG: 10 INJECTION, SOLUTION INTRAVENOUS at 10:59

## 2023-10-02 RX ADMIN — NIMODIPINE 60 MG: 30 CAPSULE, LIQUID FILLED ORAL at 14:08

## 2023-10-02 RX ADMIN — NIMODIPINE 60 MG: 30 CAPSULE, LIQUID FILLED ORAL at 06:37

## 2023-10-02 RX ADMIN — LEVETIRACETAM 500 MG: 5 INJECTION INTRAVENOUS at 17:15

## 2023-10-02 RX ADMIN — ROCURONIUM BROMIDE 20 MG: 10 INJECTION, SOLUTION INTRAVENOUS at 11:18

## 2023-10-02 RX ADMIN — ROCURONIUM BROMIDE 50 MG: 10 INJECTION, SOLUTION INTRAVENOUS at 09:45

## 2023-10-02 RX ADMIN — LABETALOL HYDROCHLORIDE 5 MG: 5 INJECTION, SOLUTION INTRAVENOUS at 12:41

## 2023-10-02 RX ADMIN — Medication 40 MCG: at 11:50

## 2023-10-02 RX ADMIN — NIMODIPINE 60 MG: 30 CAPSULE, LIQUID FILLED ORAL at 22:16

## 2023-10-02 RX ADMIN — ACETAMINOPHEN 650 MG: 325 TABLET, FILM COATED ORAL at 20:03

## 2023-10-02 RX ADMIN — ROCURONIUM BROMIDE 20 MG: 10 INJECTION, SOLUTION INTRAVENOUS at 10:30

## 2023-10-02 RX ADMIN — HEPARIN SODIUM 2000 UNITS: 1000 INJECTION, SOLUTION INTRAVENOUS; SUBCUTANEOUS at 12:19

## 2023-10-02 RX ADMIN — LABETALOL HYDROCHLORIDE 10 MG: 5 INJECTION, SOLUTION INTRAVENOUS at 12:43

## 2023-10-02 SDOH — ECONOMIC STABILITY: FOOD INSECURITY: WITHIN THE PAST 12 MONTHS, YOU WORRIED THAT YOUR FOOD WOULD RUN OUT BEFORE YOU GOT MONEY TO BUY MORE.: NEVER TRUE

## 2023-10-02 SDOH — ECONOMIC STABILITY: TRANSPORTATION INSECURITY
IN THE PAST 12 MONTHS, HAS THE LACK OF TRANSPORTATION KEPT YOU FROM MEDICAL APPOINTMENTS OR FROM GETTING MEDICATIONS?: NO

## 2023-10-02 SDOH — SOCIAL STABILITY: SOCIAL INSECURITY: HAS ANYONE EVER THREATENED TO HURT YOUR FAMILY OR YOUR PETS?: NO

## 2023-10-02 SDOH — ECONOMIC STABILITY: HOUSING INSECURITY
IN THE LAST 12 MONTHS, WAS THERE A TIME WHEN YOU DID NOT HAVE A STEADY PLACE TO SLEEP OR SLEPT IN A SHELTER (INCLUDING NOW)?: NO

## 2023-10-02 SDOH — SOCIAL STABILITY: SOCIAL INSECURITY: ARE YOU OR HAVE YOU BEEN THREATENED OR ABUSED PHYSICALLY, EMOTIONALLY, OR SEXUALLY BY ANYONE?: NO

## 2023-10-02 SDOH — ECONOMIC STABILITY: INCOME INSECURITY: IN THE LAST 12 MONTHS, WAS THERE A TIME WHEN YOU WERE NOT ABLE TO PAY THE MORTGAGE OR RENT ON TIME?: NO

## 2023-10-02 SDOH — SOCIAL STABILITY: SOCIAL INSECURITY: HAVE YOU HAD THOUGHTS OF HARMING ANYONE ELSE?: NO

## 2023-10-02 SDOH — ECONOMIC STABILITY: FOOD INSECURITY: WITHIN THE PAST 12 MONTHS, THE FOOD YOU BOUGHT JUST DIDN'T LAST AND YOU DIDN'T HAVE MONEY TO GET MORE.: NEVER TRUE

## 2023-10-02 SDOH — SOCIAL STABILITY: SOCIAL INSECURITY: WERE YOU ABLE TO COMPLETE ALL THE BEHAVIORAL HEALTH SCREENINGS?: YES

## 2023-10-02 SDOH — SOCIAL STABILITY: SOCIAL INSECURITY: DO YOU FEEL UNSAFE GOING BACK TO THE PLACE WHERE YOU ARE LIVING?: NO

## 2023-10-02 SDOH — ECONOMIC STABILITY: TRANSPORTATION INSECURITY
IN THE PAST 12 MONTHS, HAS LACK OF TRANSPORTATION KEPT YOU FROM MEETINGS, WORK, OR FROM GETTING THINGS NEEDED FOR DAILY LIVING?: NO

## 2023-10-02 SDOH — SOCIAL STABILITY: SOCIAL INSECURITY: DOES ANYONE TRY TO KEEP YOU FROM HAVING/CONTACTING OTHER FRIENDS OR DOING THINGS OUTSIDE YOUR HOME?: NO

## 2023-10-02 SDOH — ECONOMIC STABILITY: INCOME INSECURITY: IN THE PAST 12 MONTHS, HAS THE ELECTRIC, GAS, OIL, OR WATER COMPANY THREATENED TO SHUT OFF SERVICE IN YOUR HOME?: NO

## 2023-10-02 SDOH — ECONOMIC STABILITY: INCOME INSECURITY: HOW HARD IS IT FOR YOU TO PAY FOR THE VERY BASICS LIKE FOOD, HOUSING, MEDICAL CARE, AND HEATING?: NOT HARD AT ALL

## 2023-10-02 SDOH — SOCIAL STABILITY: SOCIAL INSECURITY: DO YOU FEEL ANYONE HAS EXPLOITED OR TAKEN ADVANTAGE OF YOU FINANCIALLY OR OF YOUR PERSONAL PROPERTY?: NO

## 2023-10-02 SDOH — SOCIAL STABILITY: SOCIAL INSECURITY: ARE THERE ANY APPARENT SIGNS OF INJURIES/BEHAVIORS THAT COULD BE RELATED TO ABUSE/NEGLECT?: NO

## 2023-10-02 SDOH — HEALTH STABILITY: MENTAL HEALTH: CURRENT SMOKER: 1

## 2023-10-02 ASSESSMENT — ACTIVITIES OF DAILY LIVING (ADL)
ADEQUATE_TO_COMPLETE_ADL: YES
TOILETING: INDEPENDENT
HEARING - RIGHT EAR: UNABLE TO ASSESS
FEEDING YOURSELF: INDEPENDENT
WALKS IN HOME: UNABLE TO ASSESS
JUDGMENT_ADEQUATE_SAFELY_COMPLETE_DAILY_ACTIVITIES: UNABLE TO ASSESS
ADEQUATE_TO_COMPLETE_ADL: YES
GROOMING: INDEPENDENT
WALKS IN HOME: INDEPENDENT
DRESSING YOURSELF: INDEPENDENT
DRESSING YOURSELF: UNABLE TO ASSESS
ADEQUATE_TO_COMPLETE_ADL: UNABLE TO ASSESS
FEEDING YOURSELF: UNABLE TO ASSESS
GROOMING: UNABLE TO ASSESS
HEARING - RIGHT EAR: FUNCTIONAL
PATIENT'S MEMORY ADEQUATE TO SAFELY COMPLETE DAILY ACTIVITIES?: UNABLE TO ASSESS
PATIENT'S MEMORY ADEQUATE TO SAFELY COMPLETE DAILY ACTIVITIES?: YES
BATHING: INDEPENDENT
HEARING - LEFT EAR: FUNCTIONAL
TOILETING: INDEPENDENT
BATHING: UNABLE TO ASSESS
JUDGMENT_ADEQUATE_SAFELY_COMPLETE_DAILY_ACTIVITIES: YES
HEARING - LEFT EAR: FUNCTIONAL
HEARING - RIGHT EAR: FUNCTIONAL
WALKS IN HOME: INDEPENDENT
PATIENT'S MEMORY ADEQUATE TO SAFELY COMPLETE DAILY ACTIVITIES?: YES
BATHING: INDEPENDENT
JUDGMENT_ADEQUATE_SAFELY_COMPLETE_DAILY_ACTIVITIES: YES
GROOMING: INDEPENDENT
HEARING - LEFT EAR: UNABLE TO ASSESS
DRESSING YOURSELF: INDEPENDENT
TOILETING: UNABLE TO ASSESS
FEEDING YOURSELF: INDEPENDENT

## 2023-10-02 ASSESSMENT — COGNITIVE AND FUNCTIONAL STATUS - GENERAL
DAILY ACTIVITIY SCORE: 24
DAILY ACTIVITIY SCORE: 24
PATIENT BASELINE BEDBOUND: NO
DAILY ACTIVITIY SCORE: 24
WALKING IN HOSPITAL ROOM: A LITTLE
MOBILITY SCORE: 24
MOBILITY SCORE: 24
MOBILITY SCORE: 23

## 2023-10-02 ASSESSMENT — PAIN SCALES - GENERAL
PAINLEVEL_OUTOF10: 5 - MODERATE PAIN
PAINLEVEL_OUTOF10: 5 - MODERATE PAIN
PAINLEVEL_OUTOF10: 10 - WORST POSSIBLE PAIN
PAINLEVEL_OUTOF10: 8
PAINLEVEL_OUTOF10: 10 - WORST POSSIBLE PAIN
PAINLEVEL_OUTOF10: 8
PAINLEVEL_OUTOF10: 8
PAINLEVEL_OUTOF10: 5 - MODERATE PAIN
PAINLEVEL_OUTOF10: 8
PAINLEVEL_OUTOF10: 10 - WORST POSSIBLE PAIN
PAIN_LEVEL: 0
PAINLEVEL_OUTOF10: 3
PAINLEVEL_OUTOF10: 3

## 2023-10-02 ASSESSMENT — LIFESTYLE VARIABLES
HOW OFTEN DO YOU HAVE 6 OR MORE DRINKS ON ONE OCCASION: NEVER
PRESCIPTION_ABUSE_PAST_12_MONTHS: NO
HOW MANY STANDARD DRINKS CONTAINING ALCOHOL DO YOU HAVE ON A TYPICAL DAY: 1 OR 2
AUDIT-C TOTAL SCORE: 1
AUDIT-C TOTAL SCORE: 1
HOW OFTEN DO YOU HAVE A DRINK CONTAINING ALCOHOL: MONTHLY OR LESS
SUBSTANCE_ABUSE_PAST_12_MONTHS: NO
SKIP TO QUESTIONS 9-10: 1

## 2023-10-02 ASSESSMENT — PAIN SCALES - PAIN ASSESSMENT IN ADVANCED DEMENTIA (PAINAD)
BODYLANGUAGE: TENSE, DISTRESSED PACING, FIDGETING
NEGVOCALIZATION: OCCASIONAL MOAN/GROAN, LOW SPEECH, NEGATIVE/DISAPPROVING QUALITY
BODYLANGUAGE: RIGID, FISTS CLENCHED, KNEES UP, PUSHING/PULLING AWAY, STRIKES OUT
TOTALSCORE: 7
BREATHING: NOISY LABORED BREATHING, LONG PERIODS OF HYPERVENTILATION, CHEYNE-STOKES RESPIRATIONS
BREATHING: OCCASIONAL LABORED BREATHING, SHORT PERIOD OF HYPERVENTILATION
CONSOLABILITY: DISTRACTED OR REASSURED BY VOICE/TOUCH
NEGVOCALIZATION: REPEATED TROUBLED CALLING OUT, LOUD MOANING/GROANING, CRYING
NEGVOCALIZATION: OCCASIONAL MOAN/GROAN, LOW SPEECH, NEGATIVE/DISAPPROVING QUALITY
FACIALEXPRESSION: SAD, FRIGHTENED, FROWN
FACIALEXPRESSION: FACIAL GRIMACING
CONSOLABILITY: DISTRACTED OR REASSURED BY VOICE/TOUCH
TOTALSCORE: 6
BREATHING: NOISY LABORED BREATHING, LONG PERIODS OF HYPERVENTILATION, CHEYNE-STOKES RESPIRATIONS
BODYLANGUAGE: TENSE, DISTRESSED PACING, FIDGETING
TOTALSCORE: 8
FACIALEXPRESSION: FACIAL GRIMACING
CONSOLABILITY: DISTRACTED OR REASSURED BY VOICE/TOUCH

## 2023-10-02 ASSESSMENT — PATIENT HEALTH QUESTIONNAIRE - PHQ9
SUM OF ALL RESPONSES TO PHQ9 QUESTIONS 1 & 2: 0
2. FEELING DOWN, DEPRESSED OR HOPELESS: NOT AT ALL
1. LITTLE INTEREST OR PLEASURE IN DOING THINGS: NOT AT ALL

## 2023-10-02 ASSESSMENT — PAIN - FUNCTIONAL ASSESSMENT
PAIN_FUNCTIONAL_ASSESSMENT: 0-10

## 2023-10-02 ASSESSMENT — PAIN SCALES - WONG BAKER
WONGBAKER_NUMERICALRESPONSE: HURTS WHOLE LOT
WONGBAKER_NUMERICALRESPONSE: HURTS WORST

## 2023-10-02 ASSESSMENT — PAIN DESCRIPTION - DESCRIPTORS: DESCRIPTORS: ACHING

## 2023-10-02 NOTE — CARE PLAN
The patient's goals for the shift include  remain neurologically intact     The clinical goals for the shift include SBP< 140    Over the shift, the patient did not make progress toward the following goals. Barriers to progression include ***. Recommendations to address these barriers include ***.

## 2023-10-02 NOTE — ANESTHESIA PREPROCEDURE EVALUATION
Patient: Reshma Landers    Procedure Information       Date/Time: 10/02/23 0815    Procedure: IR ANGIOGRAM    Location: [facility]            Relevant Problems   Cardiovascular (within normal limits)   (+) Cerebral aneurysm      Endocrine (within normal limits)      GI (within normal limits)      /Renal (within normal limits)      Neuro/Psych   (+) Cerebral aneurysm      Pulmonary (within normal limits)      GI/Hepatic (within normal limits)      Hematology (within normal limits)      Musculoskeletal (within normal limits)      Eyes, Ears, Nose, and Throat (within normal limits)       Clinical information reviewed:      Problems    OB Status           NPO Detail:  No data recorded     Physical Exam    Airway  Mallampati: III  TM distance: >3 FB  Neck ROM: full     Cardiovascular   Rhythm: regular  Rate: abnormal     Dental    Pulmonary   Breath sounds clear to auscultation     Abdominal            Anesthesia Plan    ASA 4     general     The patient is a current smoker.    intravenous induction   Postoperative administration of opioids is intended.  Anesthetic plan and risks discussed with patient.  Use of blood products discussed with patient who.    Plan discussed with attending.

## 2023-10-02 NOTE — ANESTHESIA POSTPROCEDURE EVALUATION
Patient: Reshma Landers    Procedure Summary       Date: 10/02/23 Room / Location: [facility]    Anesthesia Start: 0929 Anesthesia Stop: 1315    Procedure: IR ANGIOGRAM Diagnosis: (ruptured aneurysm)    Scheduled Providers:  Responsible Provider: Nila Tapia MD    Anesthesia Type: general ASA Status: 4            Anesthesia Type: general    Vitals Value Taken Time   /85 10/02/23 1315   Temp 36 10/02/23 1315   Pulse 68 10/02/23 1315   Resp 14 10/02/23 1315   SpO2 100 10/02/23 1315       Anesthesia Post Evaluation    Patient location during evaluation: ICU  Patient participation: complete - patient participated  Level of consciousness: awake and alert  Pain score: 0  Pain management: adequate  Airway patency: patent  Cardiovascular status: acceptable, blood pressure returned to baseline and hemodynamically stable  Respiratory status: acceptable and face mask  Hydration status: acceptable        No notable events documented.

## 2023-10-02 NOTE — ANESTHESIA PROCEDURE NOTES
Peripheral IV  Date/Time: 10/2/2023 10:01 AM      Placement  Needle size: 16 G  Laterality: left  Location: hand  Site prep: alcohol  Attempts: 1

## 2023-10-02 NOTE — PRE-PROCEDURE NOTE
INTERVENTIONAL RADIOLOGY PRE-PROCEDURE NOTE    HPI     Indication for procedure: The primary encounter diagnosis was Subarachnoid hemorrhage (CMS/HCC). Diagnoses of Other cerebrovascular disorders in diseases classified elsewhere and Cerebral aneurysm were also pertinent to this visit.    Planned procedure: Coil embolization of basilar tip aneurysm    Relevant review of systems: NA      Relevant Labs:   Lab Results   Component Value Date    CREATININE 0.67 10/02/2023    EGFR >90 10/02/2023    INR 1.1 10/02/2023    PROTIME 12.8 10/02/2023       Planned Sedation/Anesthesia: Deep    Directed physical examination:    A&Ox3, following commands with full strength in all extremities    Current Facility-Administered Medications:     acetaminophen (Tylenol) tablet 650 mg, 650 mg, oral, 4x daily, Maylin Rosas MD, 650 mg at 10/02/23 1408    dexAMETHasone (Decadron) injection 2 mg, 2 mg, intravenous, q8h CAMILA, Maylin Rosas MD, 2 mg at 10/02/23 1409    dextrose 10 % infusion, 0.3 g/kg/hr, intravenous, Once PRN, Baoz Lange MD    dextrose 50 % injection 25 g, 25 g, intravenous, q15 min PRN, Boaz Lange MD    glucagon (Glucagen) injection 1 mg, 1 mg, intramuscular, q15 min PRN, Boaz Lange MD    hydrALAZINE (Apresoline) injection 10 mg, 10 mg, intravenous, q4h PRN, Estela Pisano MD    insulin lispro (HumaLOG) injection 0-10 Units, 0-10 Units, subcutaneous, TID with meals, Boaz Lange MD    labetaloL (Normodyne,Trandate) injection 20 mg, 20 mg, intravenous, q10 min PRN, Estela Pisano MD    magnesium sulfate IV 2 g, 2 g, intravenous, q6h PRN, Boaz Lange MD    niMODipine (Nimotop) capsule 60 mg, 60 mg, oral, q4h, 60 mg at 10/02/23 1715 **OR** niMODipine liquid 60 mg, 60 mg, oral, q4h, Maylin Rosas MD, 60 mg at 10/02/23 0335    oxyCODONE (Roxicodone) immediate release tablet 5 mg, 5 mg, oral, q6h PRN, Maylin Rosas MD, 5 mg at 10/02/23 0835    [START ON 10/3/2023] pantoprazole (ProtoNix) EC tablet 20 mg,  20 mg, oral, Daily before breakfast, Boaz Lange MD    perflutren lipid microspheres (Definity) injection 0.5 mL, 0.5 mL, intravenous, Once in imaging, Boaz Lange MD    perflutren protein A microsphere (Optison) injection 0.5 mL, 0.5 mL, intravenous, Once in imaging, Boaz Lange MD    polyethylene glycol (Glycolax, Miralax) packet 17 g, 17 g, oral, Daily, Maylin Rosas MD, 17 g at 10/02/23 0836    potassium chloride CR (Klor-Con M20) ER tablet 20 mEq, 20 mEq, oral, q6h PRN **OR** potassium chloride (Klor-Con) packet 20 mEq, 20 mEq, oral, q6h PRN, Boaz Lange MD    potassium chloride CR (Klor-Con M20) ER tablet 40 mEq, 40 mEq, oral, q6h PRN **OR** potassium chloride (Klor-Con) packet 40 mEq, 40 mEq, oral, q6h PRN, Boaz Lange MD    potassium chloride IV 20 mEq, 20 mEq, intravenous, q6h PRN, Boaz Lange MD    sennosides-docusate sodium (Yandy-Colace) 8.6-50 mg per tablet 2 tablet, 2 tablet, oral, BID, Maylin Rosas MD, 2 tablet at 10/02/23 0836    sulfur hexafluoride microsphr (Lumason) injection 24.28 mg, 2 mL, intravenous, Once in imaging, Boaz Lange MD     Benefits, risks and alternatives of procedure and planned sedation have been discussed with the patient and/or their representative. All questions answered and they agree to proceed.     Olivia Gonzales MD

## 2023-10-02 NOTE — ANESTHESIA PROCEDURE NOTES
Arterial Line:    Date/Time: 10/2/2023 10:01 AM    Staffing  Performed: CRNA   Authorized by: Nila Tapia MD    Performed by: ALEX Sevilla-CRNA    An arterial line was placed. Procedure performed using surface landmarks.in the OR for the following indication(s): continuous blood pressure monitoring and blood sampling needed.    A 20 gauge (size), 1 and 3/4 inch (length), Angiocath (type) catheter was placed into the radial artery, secured by tapeEvents:  patient tolerated procedure well with no complications.

## 2023-10-02 NOTE — ANESTHESIA PROCEDURE NOTES
Peripheral IV  Date/Time: 10/2/2023 10:02 AM      Placement  Needle size: 16 G  Laterality: left  Location: hand  Site prep: alcohol  Attempts: 1

## 2023-10-02 NOTE — PROGRESS NOTES
"Subjective   HPI: Reshma Landers is a 37 y.o. female h/o tobacco use p/w WHOL on 9/29, CTH basal sylvian Subarachnoid hemorrhage (CMS/HCC) (HH2, mF3), PBD3, CTA 6mm basilar tip aneurysm     Interval Events: No events overnight, continues to have HA     Objective   Vitals 24 hour ranges:  Heart Rate:  [61-88]   Temp:  [36.1 °C (97 °F)-36.8 °C (98.2 °F)]   Resp:  [10-18]   BP: ()/(52-90)   Height:  [160 cm (5' 3\")-165.1 cm (5' 5\")]   Weight:  [61.2 kg (135 lb)]   SpO2:  [96 %-99 %]      Intake/Output for last 24 hours:    Intake/Output Summary (Last 24 hours) at 10/2/2023 0649  Last data filed at 10/2/2023 0553  Gross per 24 hour   Intake 300 ml   Output 200 ml   Net 100 ml     Physical Exam  NEURO:  -Alert, oriented x3  -EOS, PERRL, EOMI, VFF  -Follows commands, 5/5 strength, no drift  CV:  -RRR on telemetry, NSR  RESP:  -Regular, unlabored  -Oxygen: Room air   :  - No catheter in place  GI:  -Abdomen NT/ND, soft  SKIN:  - Intact    Medications    Current Facility-Administered Medications:     acetaminophen (Tylenol) tablet 650 mg, 650 mg, oral, 4x daily, Maylin Rosas MD, 650 mg at 10/02/23 0636    dexAMETHasone (Decadron) injection 2 mg, 2 mg, intravenous, q8h CAMILA, Maylin Rosas MD, 2 mg at 10/02/23 0553    hydrALAZINE (Apresoline) injection 10 mg, 10 mg, intravenous, q20 min PRN, Maylin Rosas MD    labetaloL (Normodyne,Trandate) injection 10 mg, 10 mg, intravenous, q10 min PRN, Maylin Rosas MD    levETIRAcetam in NaCl (iso-os) (Keppra)  mg, 500 mg, intravenous, q12h, Maylin Rosas MD, Stopped at 10/02/23 0608    niCARdipine (Cardene) 40 mg in sodium chloride 200 mL (0.2 mg/mL) infusion (premix), 1-15 mg/hr, intravenous, Continuous PRN, Maylin Rosas MD    niMODipine (Nimotop) capsule 60 mg, 60 mg, oral, q4h, 60 mg at 10/02/23 0637 **OR** niMODipine liquid 60 mg, 60 mg, oral, q4h, Maylin Rosas MD, 60 mg at 10/02/23 0335    oxyCODONE (Roxicodone) immediate release tablet 5 mg, 5 mg, " oral, q6h PRN, Maylin Rosas MD, 5 mg at 10/02/23 0233    polyethylene glycol (Glycolax, Miralax) packet 17 g, 17 g, oral, Daily, Maylin Rosas MD    sennosides-docusate sodium (Yandy-Colace) 8.6-50 mg per tablet 2 tablet, 2 tablet, oral, BID, Maylin Rosas MD, 2 tablet at 10/01/23 2032     Lab Results  Admission on 10/01/2023   Component Date Value Ref Range Status    POCT pH, Venous 10/01/2023 7.42  7.33 - 7.43 pH Final    POCT pCO2, Venous 10/01/2023 40 (L)  41 - 51 mm Hg Final    POCT pO2, Venous 10/01/2023 43  35 - 45 mm Hg Final    POCT SO2, Venous 10/01/2023 74  45 - 75 % Final    POCT Oxy Hemoglobin, Venous 10/01/2023 68.8  45.0 - 75.0 % Final    POCT Hematocrit Calculated, Venous 10/01/2023 44.0  36.0 - 46.0 % Final    POCT Sodium, Venous 10/01/2023 142  136 - 145 mmol/L Final    POCT Potassium, Venous 10/01/2023 3.7  3.5 - 5.3 mmol/L Final    POCT Chloride, Venous 10/01/2023 112 (H)  98 - 107 mmol/L Final    POCT Ionized Calicum, Venous 10/01/2023 1.16  1.10 - 1.33 mmol/L Final    POCT Glucose, Venous 10/01/2023 104 (H)  74 - 99 mg/dL Final    POCT Lactate, Venous 10/01/2023 0.7  0.4 - 2.0 mmol/L Final    POCT Base Excess, Venous 10/01/2023 1.3  -2.0 - 3.0 mmol/L Final    POCT HCO3 Calculated, Venous 10/01/2023 25.9  22.0 - 26.0 mmol/L Final    POCT Hemoglobin, Venous 10/01/2023 14.5  12.0 - 16.0 g/dL Final    POCT Anion Gap, Venous 10/01/2023 8.0 (L)  10.0 - 25.0 mmol/L Final    Patient Temperature 10/01/2023 37.0  degrees Celsius Final    WBC 10/01/2023 14.2 (H)  4.4 - 11.3 x10*3/uL Final    nRBC 10/01/2023 0.0  0.0 - 0.0 /100 WBCs Final    RBC 10/01/2023 4.44  4.00 - 5.20 x10*6/uL Final    Hemoglobin 10/01/2023 13.7  12.0 - 16.0 g/dL Final    Hematocrit 10/01/2023 39.8  36.0 - 46.0 % Final    MCV 10/01/2023 90  80 - 100 fL Final    MCH 10/01/2023 30.9  26.0 - 34.0 pg Final    MCHC 10/01/2023 34.4  32.0 - 36.0 g/dL Final    RDW 10/01/2023 12.8  11.5 - 14.5 % Final    Platelets 10/01/2023 281  150  - 450 x10*3/uL Final    MPV 10/01/2023 9.6  7.5 - 11.5 fL Final    Neutrophils % 10/01/2023 70.8  40.0 - 80.0 % Final    Immature Granulocytes %, Automated 10/01/2023 0.4  0.0 - 0.9 % Final    Lymphocytes % 10/01/2023 22.1  13.0 - 44.0 % Final    Monocytes % 10/01/2023 5.9  2.0 - 10.0 % Final    Eosinophils % 10/01/2023 0.4  0.0 - 6.0 % Final    Basophils % 10/01/2023 0.4  0.0 - 2.0 % Final    Neutrophils Absolute 10/01/2023 10.05 (H)  1.20 - 7.70 x10*3/uL Final    Immature Granulocytes Absolute, Au* 10/01/2023 0.06  0.00 - 0.70 x10*3/uL Final    Lymphocytes Absolute 10/01/2023 3.14  1.20 - 4.80 x10*3/uL Final    Monocytes Absolute 10/01/2023 0.84  0.10 - 1.00 x10*3/uL Final    Eosinophils Absolute 10/01/2023 0.05  0.00 - 0.70 x10*3/uL Final    Basophils Absolute 10/01/2023 0.06  0.00 - 0.10 x10*3/uL Final    Magnesium 10/01/2023 1.90  1.60 - 2.40 mg/dL Final    Glucose 10/01/2023 93  74 - 99 mg/dL Final    Sodium 10/01/2023 147 (H)  136 - 145 mmol/L Final    Potassium 10/01/2023 3.8  3.5 - 5.3 mmol/L Final    Chloride 10/01/2023 114 (H)  98 - 107 mmol/L Final    Bicarbonate 10/01/2023 22  21 - 32 mmol/L Final    Anion Gap 10/01/2023 15  10 - 20 mmol/L Final    Urea Nitrogen 10/01/2023 12  6 - 23 mg/dL Final    Creatinine 10/01/2023 0.75  0.50 - 1.05 mg/dL Final    eGFR 10/01/2023 >90  >60 mL/min/1.73m*2 Final    Calcium 10/01/2023 8.9  8.6 - 10.6 mg/dL Final    Albumin 10/01/2023 4.1  3.4 - 5.0 g/dL Final    Alkaline Phosphatase 10/01/2023 44  33 - 110 U/L Final    Total Protein 10/01/2023 6.9  6.4 - 8.2 g/dL Final    AST 10/01/2023 15  9 - 39 U/L Final    Bilirubin, Total 10/01/2023 0.5  0.0 - 1.2 mg/dL Final    ALT 10/01/2023 13  7 - 45 U/L Final    Protime 10/01/2023 13.1 (H)  9.8 - 12.8 seconds Final    INR 10/01/2023 1.2 (H)  0.9 - 1.1 Final    aPTT 10/01/2023 31  27 - 38 seconds Final    Hemoglobin A1C 10/01/2023 5.0  see below % Final    Estimated Average Glucose 10/01/2023 97  Not Established mg/dL Final     Cholesterol 10/01/2023 166  0 - 199 mg/dL Final    HDL-Cholesterol 10/01/2023 34.1  mg/dL Final    Cholesterol/HDL Ratio 10/01/2023 4.9   Final    Non-HDL Cholesterol 10/01/2023 132  0 - 149 mg/dL Final    BNP 10/01/2023 7  0 - 99 pg/mL Final    Troponin I, High Sensitivity 10/01/2023 4  0 - 34 ng/L Final    Color, Urine 10/01/2023 Yellow  Straw, Yellow Final    Appearance, Urine 10/01/2023 Hazy (N)  Clear Final    Specific Gravity, Urine 10/01/2023 >1.060 (A)  1.005 - 1.035 Final    pH, Urine 10/01/2023 6.0  5.0, 5.5, 6.0, 6.5, 7.0, 7.5, 8.0 Final    Protein, Urine 10/01/2023 NEGATIVE  NEGATIVE mg/dL Final    Glucose, Urine 10/01/2023 NEGATIVE  NEGATIVE mg/dL Final    Blood, Urine 10/01/2023 NEGATIVE  NEGATIVE Final    Ketones, Urine 10/01/2023 20 (1+) (A)  NEGATIVE mg/dL Final    Bilirubin, Urine 10/01/2023 NEGATIVE  NEGATIVE Final    Urobilinogen, Urine 10/01/2023 <2.0  <2.0 mg/dL Final    Nitrite, Urine 10/01/2023 NEGATIVE  NEGATIVE Final    Leukocyte Esterase, Urine 10/01/2023 SMALL (1+) (A)  NEGATIVE Final    HCG, Beta-Quantitative 10/01/2023 <3  <5 mIU/mL Final    ABO TYPE 10/01/2023 A   Final    Rh TYPE 10/01/2023 POS   Final    ANTIBODY SCREEN 10/01/2023 NEG   Final    WBC, Urine 10/01/2023 NONE  1-5, NONE /HPF Final    RBC, Urine 10/01/2023 3-5  NONE, 1-2, 3-5 /HPF Final    Squamous Epithelial Cells, Urine 10/01/2023 NONE  Reference range not established. /HPF Final    Mucus, Urine 10/01/2023 1+  Reference range not established. /LPF Final    WBC 10/02/2023 14.3 (H)  4.4 - 11.3 x10*3/uL Final    nRBC 10/02/2023 0.0  0.0 - 0.0 /100 WBCs Final    RBC 10/02/2023 4.23  4.00 - 5.20 x10*6/uL Final    Hemoglobin 10/02/2023 12.9  12.0 - 16.0 g/dL Final    Hematocrit 10/02/2023 38.9  36.0 - 46.0 % Final    MCV 10/02/2023 92  80 - 100 fL Final    MCH 10/02/2023 30.5  26.0 - 34.0 pg Final    MCHC 10/02/2023 33.2  32.0 - 36.0 g/dL Final    RDW 10/02/2023 12.8  11.5 - 14.5 % Final    Platelets 10/02/2023 296  150 - 450  x10*3/uL Final    MPV 10/02/2023 9.4  7.5 - 11.5 fL Final    Glucose 10/02/2023 103 (H)  74 - 99 mg/dL Final    Sodium 10/02/2023 144  136 - 145 mmol/L Final    Potassium 10/02/2023 4.4  3.5 - 5.3 mmol/L Final    Chloride 10/02/2023 110 (H)  98 - 107 mmol/L Final    Bicarbonate 10/02/2023 23  21 - 32 mmol/L Final    Anion Gap 10/02/2023 15  10 - 20 mmol/L Final    Urea Nitrogen 10/02/2023 15  6 - 23 mg/dL Final    Creatinine 10/02/2023 0.67  0.50 - 1.05 mg/dL Final    eGFR 10/02/2023 >90  >60 mL/min/1.73m*2 Final    Calcium 10/02/2023 9.6  8.6 - 10.6 mg/dL Final    Phosphorus 10/02/2023 4.4  2.5 - 4.9 mg/dL Final    Albumin 10/02/2023 4.2  3.4 - 5.0 g/dL Final    Protime 10/02/2023 12.8  9.8 - 12.8 seconds Final    INR 10/02/2023 1.1  0.9 - 1.1 Final    aPTT 10/02/2023 30  27 - 38 seconds Final        Imaging Results  CTH 10/1/23 basilar SAH    CTA 10/1/23 5-6 mm anterosuperior oriented basilar tip aneurysm is present,  with slightly irregular appearance of the aneurysm near the dome.       Assessment/Plan   Reshma Landers is a 37 y.o. female h/o tobacco use p/w WHOL on 9/29, CTH basal sylvian Subarachnoid hemorrhage (CMS/HCC) (HH2, mF3), PBD3, CTA 6mm basilar tip aneurysm   Principal Problem:    Subarachnoid hemorrhage (CMS/HCC)      NEURO:  #SAH  Assessment:  -Neurologically: stable  -TCDs stable  Plan:  -NSU  -Q1H neuro checks  -Continue Nimodipine 60q4 x 21 days  -Continue Keppra 500BID  -Continue Dex 2q8 for HA  -Pain: acetaminophen, oxycodone, hydromorphone PRN  -Sedation: none  -Nausea: ondansetron PRN  -Angiogram today, will follow up on final results  -TCD, strict I/O  -PT/OT    CARDIOVASCULAR:  #No active issues  Assessment:  - Stable     Plan:  -Continue to monitor on telemetry  -Goal SBP < 140    -->Nicardipine, Hydralazine and Labetalol PRN    RESPIRATORY:  #No active issues  Assessment:  -Stable  Plan:  -Continuous pulse oximetry   -O2 PRN to maintain SpO2 > 94%, wean as tolerated  -Incentive spirometry  Q2H while awake    RENAL/:  #No active issues  Assessment:  -Baseline BUN/Cr: 12/0.75  -BUN/Cr:   Lab Results   Component Value Date    BUN 15 10/02/2023    /   Lab Results   Component Value Date    CREATININE 0.67 10/02/2023     Plan:  -Monitor with daily RFP  - Strict I/O    FEN/GI:  Assessment:  -Last BM: PTA  Plan:  -Monitor and replace electrolytes per protocol  -Diet: NPO for angio, will resume diet after angio  -Bowel Regimen: Docusate-Senna, Miralax     ENDOCRINE:  # No active issues  Assessment:  -B  Plan:  -Accuchecks & ISS Q6H    HEMATOLOGY:  #No active issues  Assessment:  -Baseline Hgb: 13.7  -Baseline Plts: 281  -Hgb:   Results from last 7 days   Lab Units 10/02/23  0251 10/01/23  1437   HEMOGLOBIN g/dL 12.9 13.7     -Plts:   Lab Results   Component Value Date     10/02/2023     Plan:  -Continue to monitor with daily CBC and Coag panel    INFECTIOUS DISEASE:  #leukocytosis  Assessment:  - WBC likely related to dex   Lab Results   Component Value Date    WBC 14.3 (H) 10/02/2023     Plan:  -Continue to monitor for s/sx of infection  -Pan culture for temperature > 38.4 C    MUSCULOSKELETAL:  -No acute issues    SKIN:  -No acute issues  -Turns and skin care per NSU protocol    ACCESS:  - PIV x 2    PROPHYLAXIS:  -DVT/VTE: SCDs  -GI: Protonix      RESTRAINTS:  Not indicated/Patient does not meet criteria for restraints    Boaz Lange MD  Neuroscience Intensive Care       Plan of care to be discussed with neurocritical care attending     The patient is critically ill with aneurysmal subarachnoid hemorrhage  Neurologically stable  Cont BP control with goal sbp<140  Cont nimodipine, keppra  Plan for aneurysm obliteration today  TTE when available      I have seen and examined the patient.  I have reviewed the patient's laboratory, radiographic, and clinical data.  I have spent 30 minutes providing critical care for the patient.      CARMELA Rehman M.D.

## 2023-10-02 NOTE — ANESTHESIA PROCEDURE NOTES
Airway  Date/Time: 10/2/2023 9:49 AM  Urgency: elective    Airway not difficult    Staffing  Performed: CRNA   Authorized by: Nila Tapia MD    Performed by: ALEX Sevilla-ABDON  Patient location during procedure: OR    Indications and Patient Condition  Indications for airway management: anesthesia  Spontaneous Ventilation: absent  Sedation level: deep  Preoxygenated: yes  Patient position: sniffing  MILS maintained throughout  Mask difficulty assessment: 1 - vent by mask    Final Airway Details  Final airway type: endotracheal airway      Successful airway: ETT  Cuffed: yes   Successful intubation technique: direct laryngoscopy  Facilitating devices/methods: intubating stylet  Endotracheal tube insertion site: oral  Blade: Sofia  Blade size: #3  ETT size (mm): 7.0  Cormack-Lehane Classification: grade I - full view of glottis  Placement verified by: chest auscultation and capnometry   Measured from: lips  ETT to lips (cm): 20  Number of attempts at approach: 1

## 2023-10-02 NOTE — BRIEF OP NOTE
Date:   OR Location: * No surgery found *    Name: Reshma Landers : 1985, Age: 37 y.o., MRN: [id number], Sex: female    Diagnosis  Ruptured Basilar Tip Aneurysm Coil Embolization     Procedures  Coil Embolization of Ruptured Basilar Tip Aneurysm  Surgeons   Dr. RENATA Boyd    Resident/Fellow/Other Assistant:  Dr. SARIKA Gonzales    Procedure Summary  Anesthesia: Deep sedation with anesthesia  Estimated Blood Loss: 15mL  Intra-op Medications: See Flowsheet           Anesthesia Record               Intraprocedure I/O Totals       None           Specimen: None  Access: 6 Fr Sheath in R CFA  Closure: Mynx  Vessels Injected: L vert, R CFA  Findings: 3.3 mm x 6.4 mm basilar tip aneurysm s/p coil embolization with <1mm intended neck remnant. Full findings in PACS dictation    Complications:  None; patient tolerated the procedure well.     Disposition: PACU - hemodynamically stable.  Condition: stable  Specimens Collected: None  Attending Attestation:

## 2023-10-02 NOTE — PROGRESS NOTES
"Reshma Landers is a 37 y.o. female on day 1 of admission presenting with Subarachnoid hemorrhage (CMS/HCC).    Subjective     No events overnight.       Objective     Awake, Ox3  Fcx4     Last Recorded Vitals  Blood pressure 100/62, pulse 70, temperature 36.6 °C (97.9 °F), temperature source Temporal, resp. rate 18, height 1.651 m (5' 5\"), weight 61.2 kg (135 lb), SpO2 99 %.  Intake/Output last 3 Shifts:  I/O last 3 completed shifts:  In: 300 (4.9 mL/kg) [IV Piggyback:300]  Out: 200 (3.3 mL/kg) [Urine:200 (0.1 mL/kg/hr)]  Weight: 61.2 kg           Assessment/Plan     Patient is a 37 year old female with h/o smoking, uncle  2/2 aneurysmal hemorrhage p/w 2 days headache and nuchal rigidity, CTH basal/Sylvian SAH, CTA 6mm basilar tip aneurysm.     NSU  SBP <140  Angio for coiling 10/2  TXA  TCDs  Nimodipine  Dex 2q8, keppra  SCDs    Delbert Brannon MD     "

## 2023-10-02 NOTE — PROGRESS NOTES
Social Work Discharge planning note:   -Patient discussed during interdisciplinary rounds.   -Team members present: Fellow, PT and OT, and SW  -Plan per medical team: Pt is not medically ready for discharge.  -Payer: [facility]  -Status: Inpatient  -Discharge disposition: PT and OT are following and will evaluate for discharge level of care recommendations when appropriate. Pt reported that she lives with her boyfriend, Kan, in a split level house (3 steps in then 10 steps up) and she plans to return home at discharge with Kan to assist as needed. SW will continue to follow to assist with discharge planning.   -Support to Pt/family: Pt will contact her employer for FMLA and short term disability paperwork and SW will follow to assist with this. Pt reported there to be no other issues or concerns about her home/social situation at this time. SW will continue to follow for emotional/incidental support to Pt and family.   -Potential Barriers: none  -Anticipated Date of Discharge: 10/10/2023

## 2023-10-02 NOTE — ED PROVIDER NOTES
Limitations to History: None    HPI: Patient is a 37-year-old female transferred from outside hospital chief concern of neck stiffness, headache.  Patient reports that because of these intractable headache presented to outside hospital where CT imaging of the head was done, notable for basilar subarachnoid hemorrhage most likely secondary to rupture of aneurysm.  Patient was immediately placed on a Cardene drip, neurosurgery was contacted and the patient was transferred to [facility] for definitive neurosurgical care.  On arrival to the emergency department patient is ANO x3, reporting headache but no other symptoms, specifically denying any new nausea/vomiting, changes in vision, fever/chills, changes in bowel bladder function, new focal numbness or weakness about her arms, legs, face.    Additional History Obtained from: Chart review from outside hospital    ------------------------------------------------------------------------------------------------------------------------------------------  Physical Exam:    VS: As documented in the triage note and EMR flowsheet from this visit were reviewed.  GEN: Well-developed, well nourished, in no apparent distress.   SKIN: Warm, dry, intact. No gross rashes or lesions.   EYES: Pupils equal, round. EOM grossly intact. Conjunctiva clear.   HENT: Normocephalic, atraumatic, mucous membranes moist.   NECK: Supple, trachea midline.   CV: Regular rate, normal rhythm, no murmurs rubs or gallops auscultated on examination  PULM: Breathing nonlabored on room air, speaks in full sentences.  Clear to auscultation bilaterally, no wheezes, rales, rhonchi.  GI: Abd soft, nontender, nondistended.   EXT: Symmetric muscle bulk, moves all equally. No joint swelling, no pedal edema.   NEURO: 5/5 strength in bilateral upper and lower extremities, no appreciable sensory deficit, pupils are 3/2 mm and reactive bilaterally.  No appreciable dysarthria, no gross cranial nerve deficits.  PSYCH: Answers  questions appropriately with congruent affect.    ------------------------------------------------------------------------------------------------------------------------------------------    Medical Decision Making:    Patient is a 37-year-old female with past medical history as noted above presenting to the emergency department a transfer from outside hospital with subarachnoid hemorrhage.  Patient is without any new neurologic deficits on arrival, she is protecting her airway, she is ANO x3.  Patient's vitals do not show any significant hypertension.  Because of these findings I do not believe that any acute intervention is necessary at this time.  I did repeat basic laboratory evaluation in addition to type and screen.  I did discuss the patient case with neurosurgery.  I did obtain CT angiogram of the head and neck to evaluate for potential aneurysm.  Neurosurgery evaluated the patient emergency room and accepted the patient for admission to the neurosurgical unit.  Patient was admitted to neurosurgical unit in stable condition.  Prior to results from imaging patient was admitted to the NICU.  She remained hemodynamically stable with no new neurologic deficits while in my care.  Admitted in stable condition.      Patient discussed with attending physician    Tr Blackwell M.D.  PGY-3 EM       Tr Blackwell MD  Resident  10/01/23 [id number]

## 2023-10-03 ENCOUNTER — APPOINTMENT (OUTPATIENT)
Dept: CARDIOLOGY | Facility: HOSPITAL | Age: 38
DRG: 022 | End: 2023-10-03
Payer: COMMERCIAL

## 2023-10-03 ENCOUNTER — APPOINTMENT (OUTPATIENT)
Dept: VASCULAR MEDICINE | Facility: HOSPITAL | Age: 38
DRG: 022 | End: 2023-10-03
Payer: COMMERCIAL

## 2023-10-03 LAB
ALBUMIN SERPL BCP-MCNC: 3.8 G/DL (ref 3.4–5)
ANION GAP SERPL CALC-SCNC: 15 MMOL/L (ref 10–20)
BUN SERPL-MCNC: 17 MG/DL (ref 6–23)
CA-I BLD-SCNC: 1.15 MMOL/L (ref 1.1–1.33)
CALCIUM SERPL-MCNC: 9.3 MG/DL (ref 8.6–10.6)
CHLORIDE SERPL-SCNC: 111 MMOL/L (ref 98–107)
CO2 SERPL-SCNC: 24 MMOL/L (ref 21–32)
CREAT SERPL-MCNC: 0.65 MG/DL (ref 0.5–1.05)
EJECTION FRACTION APICAL 4 CHAMBER: 71.6
ERYTHROCYTE [DISTWIDTH] IN BLOOD BY AUTOMATED COUNT: 12.9 % (ref 11.5–14.5)
GFR SERPL CREATININE-BSD FRML MDRD: >90 ML/MIN/1.73M*2
GLUCOSE BLD MANUAL STRIP-MCNC: 111 MG/DL (ref 74–99)
GLUCOSE BLD MANUAL STRIP-MCNC: 142 MG/DL (ref 74–99)
GLUCOSE BLD MANUAL STRIP-MCNC: 154 MG/DL (ref 74–99)
GLUCOSE BLD MANUAL STRIP-MCNC: 157 MG/DL (ref 74–99)
GLUCOSE SERPL-MCNC: 103 MG/DL (ref 74–99)
HCT VFR BLD AUTO: 32.4 % (ref 36–46)
HGB BLD-MCNC: 11.1 G/DL (ref 12–16)
MAGNESIUM SERPL-MCNC: 1.64 MG/DL (ref 1.6–2.4)
MCH RBC QN AUTO: 30.6 PG (ref 26–34)
MCHC RBC AUTO-ENTMCNC: 34.3 G/DL (ref 32–36)
MCV RBC AUTO: 89 FL (ref 80–100)
NRBC BLD-RTO: 0 /100 WBCS (ref 0–0)
PHOSPHATE SERPL-MCNC: 4.4 MG/DL (ref 2.5–4.9)
PLATELET # BLD AUTO: 255 X10*3/UL (ref 150–450)
PMV BLD AUTO: 9.4 FL (ref 7.5–11.5)
POTASSIUM SERPL-SCNC: 4 MMOL/L (ref 3.5–5.3)
RBC # BLD AUTO: 3.63 X10*6/UL (ref 4–5.2)
SODIUM SERPL-SCNC: 146 MMOL/L (ref 136–145)
WBC # BLD AUTO: 13.7 X10*3/UL (ref 4.4–11.3)

## 2023-10-03 PROCEDURE — 93306 TTE W/DOPPLER COMPLETE: CPT

## 2023-10-03 PROCEDURE — 96372 THER/PROPH/DIAG INJ SC/IM: CPT | Performed by: STUDENT IN AN ORGANIZED HEALTH CARE EDUCATION/TRAINING PROGRAM

## 2023-10-03 PROCEDURE — 83735 ASSAY OF MAGNESIUM: CPT

## 2023-10-03 PROCEDURE — 2500000004 HC RX 250 GENERAL PHARMACY W/ HCPCS (ALT 636 FOR OP/ED): Performed by: STUDENT IN AN ORGANIZED HEALTH CARE EDUCATION/TRAINING PROGRAM

## 2023-10-03 PROCEDURE — 96372 THER/PROPH/DIAG INJ SC/IM: CPT

## 2023-10-03 PROCEDURE — 2020000001 HC ICU ROOM DAILY

## 2023-10-03 PROCEDURE — 97166 OT EVAL MOD COMPLEX 45 MIN: CPT | Mod: GO

## 2023-10-03 PROCEDURE — 80069 RENAL FUNCTION PANEL: CPT

## 2023-10-03 PROCEDURE — 93886 INTRACRANIAL COMPLETE STUDY: CPT | Performed by: PSYCHIATRY & NEUROLOGY

## 2023-10-03 PROCEDURE — 2500000001 HC RX 250 WO HCPCS SELF ADMINISTERED DRUGS (ALT 637 FOR MEDICARE OP): Performed by: STUDENT IN AN ORGANIZED HEALTH CARE EDUCATION/TRAINING PROGRAM

## 2023-10-03 PROCEDURE — 97161 PT EVAL LOW COMPLEX 20 MIN: CPT | Mod: GP

## 2023-10-03 PROCEDURE — 99291 CRITICAL CARE FIRST HOUR: CPT | Performed by: NEUROLOGICAL SURGERY

## 2023-10-03 PROCEDURE — 82947 ASSAY GLUCOSE BLOOD QUANT: CPT

## 2023-10-03 PROCEDURE — 85027 COMPLETE CBC AUTOMATED: CPT

## 2023-10-03 PROCEDURE — 99232 SBSQ HOSP IP/OBS MODERATE 35: CPT | Performed by: NEUROLOGICAL SURGERY

## 2023-10-03 PROCEDURE — 2500000004 HC RX 250 GENERAL PHARMACY W/ HCPCS (ALT 636 FOR OP/ED)

## 2023-10-03 PROCEDURE — 93306 TTE W/DOPPLER COMPLETE: CPT | Performed by: INTERNAL MEDICINE

## 2023-10-03 PROCEDURE — 93886 INTRACRANIAL COMPLETE STUDY: CPT

## 2023-10-03 PROCEDURE — 82330 ASSAY OF CALCIUM: CPT

## 2023-10-03 PROCEDURE — 37799 UNLISTED PX VASCULAR SURGERY: CPT

## 2023-10-03 RX ORDER — HEPARIN SODIUM 5000 [USP'U]/ML
5000 INJECTION, SOLUTION INTRAVENOUS; SUBCUTANEOUS EVERY 8 HOURS
Status: DISCONTINUED | OUTPATIENT
Start: 2023-10-03 | End: 2023-10-03

## 2023-10-03 RX ORDER — HEPARIN SODIUM 5000 [USP'U]/ML
5000 INJECTION, SOLUTION INTRAVENOUS; SUBCUTANEOUS EVERY 8 HOURS
Status: DISCONTINUED | OUTPATIENT
Start: 2023-10-03 | End: 2023-10-10 | Stop reason: HOSPADM

## 2023-10-03 RX ORDER — HEPARIN SODIUM 5000 [USP'U]/ML
INJECTION, SOLUTION INTRAVENOUS; SUBCUTANEOUS
Status: COMPLETED
Start: 2023-10-03 | End: 2023-10-03

## 2023-10-03 RX ADMIN — PANTOPRAZOLE SODIUM 20 MG: 20 TABLET, DELAYED RELEASE ORAL at 08:27

## 2023-10-03 RX ADMIN — OXYCODONE HYDROCHLORIDE 5 MG: 5 TABLET ORAL at 17:49

## 2023-10-03 RX ADMIN — DEXAMETHASONE SODIUM PHOSPHATE 2 MG: 4 INJECTION, SOLUTION INTRAMUSCULAR; INTRAVENOUS at 14:28

## 2023-10-03 RX ADMIN — OXYCODONE HYDROCHLORIDE 5 MG: 5 TABLET ORAL at 11:28

## 2023-10-03 RX ADMIN — ACETAMINOPHEN 650 MG: 325 TABLET, FILM COATED ORAL at 08:25

## 2023-10-03 RX ADMIN — DEXAMETHASONE SODIUM PHOSPHATE 2 MG: 4 INJECTION, SOLUTION INTRAMUSCULAR; INTRAVENOUS at 22:05

## 2023-10-03 RX ADMIN — SENNOSIDES AND DOCUSATE SODIUM 2 TABLET: 50; 8.6 TABLET ORAL at 08:27

## 2023-10-03 RX ADMIN — HEPARIN SODIUM 5000 UNITS: 5000 INJECTION INTRAVENOUS; SUBCUTANEOUS at 22:06

## 2023-10-03 RX ADMIN — ACETAMINOPHEN 650 MG: 325 TABLET, FILM COATED ORAL at 20:37

## 2023-10-03 RX ADMIN — NIMODIPINE 60 MG: 30 CAPSULE, LIQUID FILLED ORAL at 14:28

## 2023-10-03 RX ADMIN — ACETAMINOPHEN 650 MG: 325 TABLET, FILM COATED ORAL at 14:27

## 2023-10-03 RX ADMIN — NIMODIPINE 60 MG: 30 CAPSULE, LIQUID FILLED ORAL at 09:06

## 2023-10-03 RX ADMIN — HEPARIN SODIUM 5000 UNITS: 5000 INJECTION, SOLUTION INTRAVENOUS; SUBCUTANEOUS at 05:12

## 2023-10-03 RX ADMIN — SENNOSIDES AND DOCUSATE SODIUM 2 TABLET: 50; 8.6 TABLET ORAL at 20:37

## 2023-10-03 RX ADMIN — POLYETHYLENE GLYCOL 3350 17 G: 17 POWDER, FOR SOLUTION ORAL at 08:27

## 2023-10-03 RX ADMIN — DEXAMETHASONE SODIUM PHOSPHATE 2 MG: 4 INJECTION, SOLUTION INTRAMUSCULAR; INTRAVENOUS at 05:12

## 2023-10-03 RX ADMIN — NIMODIPINE 60 MG: 30 CAPSULE, LIQUID FILLED ORAL at 05:09

## 2023-10-03 RX ADMIN — PERFLUTREN 0.5 ML: 6.52 INJECTION, SUSPENSION INTRAVENOUS at 10:35

## 2023-10-03 RX ADMIN — NIMODIPINE 60 MG: 30 CAPSULE, LIQUID FILLED ORAL at 17:50

## 2023-10-03 RX ADMIN — NIMODIPINE 60 MG: 30 CAPSULE, LIQUID FILLED ORAL at 01:29

## 2023-10-03 RX ADMIN — HEPARIN SODIUM 5000 UNITS: 5000 INJECTION INTRAVENOUS; SUBCUTANEOUS at 14:28

## 2023-10-03 RX ADMIN — OXYCODONE HYDROCHLORIDE 5 MG: 5 TABLET ORAL at 05:19

## 2023-10-03 RX ADMIN — NIMODIPINE 60 MG: 30 CAPSULE, LIQUID FILLED ORAL at 22:04

## 2023-10-03 RX ADMIN — MAGNESIUM SULFATE HEPTAHYDRATE 2 G: 40 INJECTION, SOLUTION INTRAVENOUS at 04:30

## 2023-10-03 RX ADMIN — ACETAMINOPHEN 650 MG: 325 TABLET, FILM COATED ORAL at 01:30

## 2023-10-03 ASSESSMENT — PAIN - FUNCTIONAL ASSESSMENT
PAIN_FUNCTIONAL_ASSESSMENT: 0-10

## 2023-10-03 ASSESSMENT — PAIN SCALES - GENERAL
PAINLEVEL_OUTOF10: 2
PAINLEVEL_OUTOF10: 5 - MODERATE PAIN
PAINLEVEL_OUTOF10: 6
PAINLEVEL_OUTOF10: 0 - NO PAIN
PAINLEVEL_OUTOF10: 2
PAINLEVEL_OUTOF10: 0 - NO PAIN
PAINLEVEL_OUTOF10: 4
PAINLEVEL_OUTOF10: 3
PAINLEVEL_OUTOF10: 2
PAINLEVEL_OUTOF10: 0 - NO PAIN
PAINLEVEL_OUTOF10: 4
PAINLEVEL_OUTOF10: 6
PAINLEVEL_OUTOF10: 6

## 2023-10-03 ASSESSMENT — COGNITIVE AND FUNCTIONAL STATUS - GENERAL
MOBILITY SCORE: 24
CLIMB 3 TO 5 STEPS WITH RAILING: A LITTLE
DAILY ACTIVITIY SCORE: 20
STANDING UP FROM CHAIR USING ARMS: A LITTLE
WALKING IN HOSPITAL ROOM: A LITTLE
MOVING TO AND FROM BED TO CHAIR: A LITTLE
TOILETING: A LITTLE
PERSONAL GROOMING: A LITTLE
HELP NEEDED FOR BATHING: A LITTLE
MOBILITY SCORE: 20
DRESSING REGULAR LOWER BODY CLOTHING: A LITTLE

## 2023-10-03 ASSESSMENT — ACTIVITIES OF DAILY LIVING (ADL)
BATHING_ASSISTANCE: STAND BY
ADL_ASSISTANCE: INDEPENDENT

## 2023-10-03 NOTE — PROGRESS NOTES
Occupational Therapy    Evaluation    Patient Name: Reshma Landers  MRN: [id number]  Today's Date: 10/3/2023  Time Calculation  Start Time: 0929  Stop Time: 0955  Time Calculation (min): 26 min    Assessment  IP OT Assessment  OT Assessment:  (Pt requires skilled OT services to address identified deficits with emphasis on adaptive techniques, compensatory strategies, and environmental modifications in order to facilitate a safe return home.)  Prognosis: Excellent  Barriers to Discharge: None  Evaluation/Treatment Tolerance: Patient tolerated treatment well  Medical Staff Made Aware: Yes  Plan:  Treatment Interventions: ADL retraining, Endurance training, Cognitive reorientation, Functional transfer training  OT Frequency: 2 times per week  OT Discharge Recommendations:  (Pt would benefit from continued OT to progress toward goals while in hospital however no further OT needs indicated at time of discharge. Pt safe to return home with family assist.)    Subjective   Current Problem:  1. Subarachnoid hemorrhage (CMS/HCC)  Vascular US Transcranial Doppler (TCD) Complete    Vascular US Transcranial Doppler (TCD) Complete    Transthoracic Echo (TTE) Complete    Transthoracic Echo (TTE) Complete    Vascular US Transcranial Doppler (TCD) Complete    Vascular US Transcranial Doppler (TCD) Complete    Vascular US Transcranial Doppler (TCD) Complete    Vascular US Transcranial Doppler (TCD) Complete      2. Other cerebrovascular disorders in diseases classified elsewhere  Vascular US Transcranial Doppler (TCD) Complete      3. Cerebral aneurysm  Transthoracic Echo (TTE) Complete    Transthoracic Echo (TTE) Complete      4. Other nontraumatic subarachnoid hemorrhage (CMS/HCC)  Vascular US Transcranial Doppler (TCD) Complete        General:  General  Reason for Referral:  (per chart review: pt p/w WHOL on 9/29, CTH basal sylvian subarachnoid hemorrhage (CMS/HCC) (HH2, mF3), PBD3, CTA 6mm basilar tip aneurysm, 10/2 s/p coil  "embolization of basilar tip aneurysm)  Past Medical History Relevant to Rehab:  (tobacco use)  Family/Caregiver Present: Yes (significant other - Terrance)  Prior to Session Communication: Bedside nurse, Physician  Patient Position Received: Bed, 2 rail up (sitting EOB)  Preferred Learning Style: verbal  General Comment:  (pt seated EOB combing hair upon approach)  Precautions:  Precautions Comment:  (SBP <200)  Vital Signs:  Heart Rate: 88 (vitals with activity stable with -120s/70s; end of session /51, HR 88, RR 15, SpO2 98%, MAP 78)  Heart Rate Source: Monitor  Resp: 15  SpO2: 98 %  BP: 126/67  MAP (mmHg): 92  BP Method: Arterial line  Patient Position: Sitting  Pain:  Pain Assessment  Pain Assessment: 0-10  Pain Score: 0 - No pain  Pain Interventions:  (pt reported discomfort and swelling of LUE d/t IV placement; provided HEP of elevation to LUE with movement during functional tasks and gross grasp of yellow sponge to reduce edema and symptoms.)    Objective   Cognition:  Overall Cognitive Status: Within Functional Limits  Orientation Level: Oriented X4  Memory:  (Pt reported baseline difficulty with memory and endorsed forgetfullness toward placement of items such as keys or medication taking. Pt reported use of sticky notes and communication with s/o as needed to assist.)  Cognition Test Scores  Cognition Tests: Cognition Test Performed  SBT Test Score: 0  Confusion Assessment Method (CAM)  Acute Onset and Fluctuating Course (1A): No  Leonardo Agitation Sedation Scale  Leonardo Agitation Sedation Scale (RASS): Alert and calm  Home Living:  Type of Home: House  Lives With: Significant other (children ages 22 & 13 y/o, +dog \"Sophie\" who free roams outside)  Home Adaptive Equipment: None  Home Layout:  (split level home with FFSU available with tub, 10 steps with HR up to bed/bath with walk-in-shower, grab bar and shower chair)   Prior Function:  Level of [place]: Independent with ADLs and functional " transfers, Independent with homemaking with ambulation  ADL Assistance: Independent  Homemaking Assistance: Independent  Ambulatory Assistance: Independent  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)  Leisure:  (pt enjoys biking and kayaking)  Hand Dominance: Right    ADL:  Eating Assistance: Independent  Grooming Assistance: Stand by (pt performed oral and facial hygiene while standing at sink with SBA for safety d/t mild unsteadiness and monitoring of vitals)  Bathing Assistance: Stand by  UE Dressing Assistance: Independent  LE Dressing Assistance: Stand by  Toileting Assistance with Device: Stand by  ADL Comments:  (anticipated ADL performance above)    Activity Tolerance:  Early Mobility/Exercise Safety Screen:  (ICU Mobility Scale: 8)  Activity Tolerance Comments:  (Pt sat EOB statically with IND ~10 minutes, required SBA for safety dynamic movements to repositioning.)  Bed Mobility/Transfers: Bed Mobility  Bed Mobility: Yes  Bed Mobility 1  Bed Mobility 1: Sitting to supine  Level of Assistance 1: Distant supervision   and Transfers  Transfer: Yes (Pt completed STS transfers 4x wtih SBA for safety with x2 trials from bed and x2 trials from chair requiring SBA for safety with cues for direciton follow and line management.)     Vision: Vision - Basic Assessment  Current Vision: No visual deficits     Extremities: RUE   RUE : Within Functional Limits, LUE   LUE: Within Functional Limits, RLE   RLE : Within Functional Limits, and LLE   LLE : Within Functional Limits    Outcome Measures: [facility] Daily Activity  Putting on and taking off regular lower body clothing: A little  Bathing (including washing, rinsing, drying): A little  Putting on and taking off regular upper body clothing: None  Toileting, which includes using toilet, bedpan or urinal: A little  Taking care of personal grooming such as brushing teeth: A little  Eating Meals: None  Daily Activity - Total Score: 20    Confusion Assessment  "Method-ICU (CAM-ICU)  Feature 3: Altered Level of Consciousness: Negative    ICU Mobility Screen  Early Mobility/Exercise Safety Screen:  (ICU Mobility Scale: 8),    Leonardo Agitation Sedation Scale  Leonardo Agitation Sedation Scale (RASS): Alert and calm    OT Adult Other Outcome Measures  Short Blessed Test (SBT): 0/28 (Pt's score indicates \"normal cognition.\")    Education Documentation  Home Exercise Program, taught by Ivonne Cedillo OT at 10/3/2023  3:38 PM.  Learner: Significant Other, Patient  Readiness: Acceptance  Method: Explanation, Demonstration  Response: Verbalizes Understanding      Goals:   Encounter Problems       Encounter Problems (Active)       BALANCE       Pt will perform ADL while reaching outside TOMI and returning self to midline >8 minutes with IND. (Progressing)       Start:  10/03/23    Expected End:  10/17/23               COGNITION/SAFETY       Pt will complete >/= 2 cognitive assessments (Medi-Cog, MOCA, TFLS, ect.) with score WFL. (Progressing)       Start:  10/03/23    Expected End:  10/17/23               IADLs       Pt will perform 100% of selected IADL task with IND including item retrieval, setup, task engagement and corrections, and clean-up. (Progressing)       Start:  10/03/23    Expected End:  10/17/23                 TRANSFERS       Pt will perform transfers to/from bed, chair, and toilet with IND. (Progressing)       Start:  10/03/23    Expected End:  10/17/23            Pt will perform all bed mobility with IND. (Progressing)       Start:  10/03/23    Expected End:  10/17/23                      "

## 2023-10-03 NOTE — PROGRESS NOTES
"Reshma Landers is a 37 y.o. female on day 3 of admission presenting with Subarachnoid hemorrhage (CMS/HCC).    Subjective   Some groin tenderness and headaches       Objective     Physical Exam  Ox3  OU4 reactive  EOMI, FS  TM  BUE 5/5, SILT  BLE 5/5, SILT  R groin soft    Last Recorded Vitals  Blood pressure 112/81, pulse 65, temperature 36.3 °C (97.3 °F), resp. rate 16, height 1.651 m (5' 5\"), weight 61.8 kg (136 lb 3.9 oz), SpO2 98 %.  Intake/Output last 3 Shifts:  I/O last 3 completed shifts:  In: 635 (10.3 mL/kg) [P.O.:480; I.V.:155 (2.5 mL/kg)]  Out: - (0 mL/kg)   Weight: 61.8 kg     Current Medications  acetaminophen, 650 mg, oral, 4x daily  heparin (porcine), 5,000 Units, subcutaneous, q8h  insulin lispro, 0-10 Units, subcutaneous, TID with meals  nicotine, 1 patch, transdermal, Daily  niMODipine, 60 mg, oral, q4h   Or  niMODipine, 60 mg, oral, q4h  pantoprazole, 20 mg, oral, Daily before breakfast  perflutren protein A microsphere, 0.5 mL, intravenous, Once in imaging  polyethylene glycol, 17 g, oral, Daily  sennosides-docusate sodium, 2 tablet, oral, BID  sulfur hexafluoride microsphr, 2 mL, intravenous, Once in imaging         Assessment/Plan   Principal Problem:    Subarachnoid hemorrhage (CMS/HCC)    37 year old female with h/o smoking, uncle  2/2 aneurysmal hemorrhage p/w 2 days headache and nuchal rigidity, CTH basal/Sylvian SAH, CTA 6mm basilar tip aneurysm.    Hospital Course  10/2 s/p coil embo of basilar tip aneurysm (small remnant)     NSU  SBP <200  TCDs  Nimodipine  Dex 2q8 for HA  SCDs, SQH           Notes are authored by overnight resident. Please page [phone] with any questions.    Oren Boyd MD              "

## 2023-10-03 NOTE — PROGRESS NOTES
Physical Therapy    Physical Therapy Evaluation    Patient Name: Reshma Landers  MRN: [id number]  Today's Date: 10/3/2023   Time Calculation  Start Time: 1501  Stop Time: 1520  Time Calculation (min): 19 min    Assessment/Plan   PT Assessment  PT Assessment Results: Decreased mobility  Rehab Prognosis: Good  Barriers to Discharge: None  Evaluation/Treatment Tolerance: Patient tolerated treatment well  Strengths: Support and attitude of living partners  End of Session Communication: Bedside nurse  End of Session Patient Position: Bed, 2 rail up, Alarm off, not on at start of session  IP OR SWING BED PT PLAN  Inpatient or Swing Bed: Inpatient  PT Plan  Treatment/Interventions: Bed mobility, Transfer training, Gait training, Stair training, Balance training, Neuromuscular re-education, Strengthening, Endurance training, Therapeutic exercise, Therapeutic activity, Home exercise program  PT Plan: Skilled PT  PT Frequency: 3 times per week  PT Discharge Recommendations: No further acute PT  PT Recommended Transfer Status: Stand by assist      Subjective   General Visit Information:  General  Reason for Referral: Basal sylvian SAH (HH2, MF3), PBD4, CTA 6 mm basilar top aneurysm s/p coil embolization 10/2/23  Past Medical History Relevant to Rehab: tobacco use  Family/Caregiver Present: Yes  Patient Position Received: Bed, 3 rail up  Home Living:  Home Living  Type of Home: House  Lives With: Significant other  Home Layout: Two level, Bed/bath upstairs, Full bath main level, Stairs to alternate level with rails  Alternate Level Stairs-Rails: Both  Alternate Level Stairs-Number of Steps: 10  Home Access: Stairs to enter with rails  Entrance Stairs-Rails: Both  Entrance Stairs-Number of Steps: 3  Bathroom Shower/Tub: Tub/shower unit, Walk-in shower  Prior Level of Function:  Prior Function Per Pt/Caregiver Report  Level of [place]: Independent with homemaking with ambulation, Independent with ADLs and functional  transfers  Vocational: Full time employment  Prior Function Comments: Drives  Precautions:  Precautions  Hearing/Visual Limitations: None  Precautions Comment: SBP <200  Vital Signs:  Vital Signs  Heart Rate: 76  SpO2: 98 %  BP: 104/72  MAP (mmHg): 83  Patient Position: Lying (Sitting, durin/83 mmHg (97), 89 bpm, SpO2 98%  Sitting, after: 100/79 mmHg (88), 80 bpm, SpO2 98%)    Objective   Pain:  Pain Assessment  Pain Assessment: 0-10  Pain Score: 0 - No pain  Cognition:  Cognition  Overall Cognitive Status: Within Functional Limits  Orientation Level: Oriented X4    General Assessments:      Activity Tolerance  Endurance: Endurance does not limit participation in activity    Sensation  Light Touch: No apparent deficits    Perception  Inattention/Neglect: Appears intact (Smooth pursuit and saccades unremarkable)     Postural Control  Postural Control: Within Functional Limits  Head Control: WFL  Trunk Control: WFL    Static Sitting Balance  Static Sitting-Balance Support: No upper extremity supported  Static Sitting-Level of Assistance: Independent  Static Sitting-Comment/Number of Minutes: 8  Dynamic Sitting Balance  Dynamic Sitting-Balance Support: No upper extremity supported    Static Standing Balance  Static Standing-Balance Support: No upper extremity supported  Static Standing-Level of Assistance: Close supervision  Static Standing-Comment/Number of Minutes: 3  Dynamic Standing Balance  Dynamic Standing-Balance Support: No upper extremity supported  Dynamic Standing-Balance: Turning  Functional Assessments:  Bed Mobility  Bed Mobility: Yes  Bed Mobility 1  Bed Mobility 1: Supine to sitting  Level of Assistance 1: Independent  Bed Mobility Comments 1: No cues needed. Seated at edge of bed at end of session    Transfers  Transfer: Yes  Transfer 1  Transfer From 1: Sit to  Transfer to 1: Stand  Transfer Level of Assistance 1: Close supervision  Trials/Comments 1: Cues for self-monitoring to ensure activity  tolerance.  Transfers 2  Transfer From 2: Stand to  Transfer to 2: Sit  Transfer Level of Assistance 2: Close supervision    Ambulation/Gait Training  Ambulation/Gait Training Performed: Yes  Ambulation/Gait Training 1  Surface 1: Level tile  Device 1: No device  Assistance 1: Close supervision  Quality of Gait 1:  (Gait mechanics grossly WFL. Tolerates head turns vertical and L/R without LOB. Able to turn 180 deg and stop without sway or LOB.)  Comments/Distance (ft) 1: 200 ft  Extremity/Trunk Assessments:  RLE   RLE : Exceptions to WFL  Strength RLE  R Hip Flexion: 5/5  R Knee Flexion: 5/5  R Knee Extension: 5/5  R Ankle Dorsiflexion: 5/5  R Ankle Plantar Flexion: 5/5  LLE   LLE : Exceptions to WFL  Strength LLE  L Hip Flexion: 5/5  L Knee Flexion: 5/5  L Knee Extension: 5/5  L Ankle Dorsiflexion: 5/5  L Ankle Plantar Flexion: 5/5  Outcome Measures:  [facility] Basic Mobility  Turning from your back to your side while in a flat bed without using bedrails: None  Moving from lying on your back to sitting on the side of a flat bed without using bedrails: None  Moving to and from bed to chair (including a wheelchair): A little  Standing up from a chair using your arms (e.g. wheelchair or bedside chair): A little  To walk in hospital room: A little  Climbing 3-5 steps with railing: A little  Basic Mobility - Total Score: 20    FSS-ICU  Ambulation: Walks >/ or equal to 150 feet with supervision  Rolling: Complete independence  Sitting: Supervision or set-up only  Transfer Sit-to-Stand: Supervision or set-up only  Transfer Supine-to-Sit: Supervision or set-up only  Total Score: 27      E = Exercise and Early Mobility  Current Activity: Ambulating in irizarry  Other Measures  5x Sit to Stand: 9 sec; Does NOT indicate increased fall risk.    Encounter Problems       Encounter Problems (Active)       Balance       FGA  (Progressing)       Start:  10/03/23    Expected End:  10/17/23       Patient will score >22/30 points on the  Functional Gait Assessment to indicate decreased risk of falling.            Mobility       Gait (Progressing)       Start:  10/03/23    Expected End:  10/17/23       Patient will ambulate at least 1000  ft. independently to improve tolerance of community distances.          Stairs (Progressing)       Start:  10/03/23    Expected End:  10/17/23       Patient will ascend and descend >/= 10 steps with railing to facilitate safe navigation of stairs in the home.             Pain - Adult          Transfers       Transfers (Progressing)       Start:  10/03/23    Expected End:  10/17/23       Patient will perform sit to stand and stand to sit transfers independently to increase functional strength.                Education Documentation  Mobility Training, taught by Karma Shirley, PT at 10/3/2023  3:45 PM.  Learner: Significant Other, Patient  Readiness: Eager  Method: Explanation  Response: Verbalizes Understanding    Education Comments  No comments found.

## 2023-10-03 NOTE — CARE PLAN
Problem: Balance  Goal: FGA   Description: Patient will score >22/30 points on the Functional Gait Assessment to indicate decreased risk of falling.  Outcome: Progressing     Problem: Mobility  Goal: Gait  Description: Patient will ambulate at least 1000  ft. independently to improve tolerance of community distances.   Outcome: Progressing  Goal: Stairs  Description: Patient will ascend and descend >/= 10 steps with railing to facilitate safe navigation of stairs in the home.   Outcome: Progressing     Problem: Transfers  Goal: Transfers  Description: Patient will perform sit to stand and stand to sit transfers independently to increase functional strength.  Outcome: Progressing

## 2023-10-03 NOTE — PROGRESS NOTES
"Subjective   HPI: Reshma Landers is a 37 y.o. female h/o tobacco use p/w WHOL on 9/29, CTH basal sylvian Subarachnoid hemorrhage (CMS/HCC) (HH2, mF3), PBD4, CTA 6mm basilar tip aneurysm     Interval Events: Patient had angio, tolerated well, continues to have HA      Objective   Vitals 24 hour ranges:  Heart Rate:  [61-88]   Temp:  [36.1 °C (97 °F)-36.8 °C (98.2 °F)]   Resp:  [10-18]   BP: ()/(52-90)   Height:  [160 cm (5' 3\")-165.1 cm (5' 5\")]   Weight:  [61.2 kg (135 lb)]   SpO2:  [96 %-99 %]      Intake/Output for last 24 hours:    Intake/Output Summary (Last 24 hours) at 10/2/2023 0649  Last data filed at 10/2/2023 0553  Gross per 24 hour   Intake 300 ml   Output 200 ml   Net 100 ml     Physical Exam  NEURO:  -Alert, oriented x3  -Follows commands, 5/5 strength, no drift  CV:  -RRR on telemetry, NSR  RESP:  -Regular, unlabored  -Oxygen: Room air   :  - No catheter in place  GI:  -Abdomen NT/ND, soft  -Groin site intact, no hematoma  SKIN:  -Intact    Medications    Current Facility-Administered Medications:     acetaminophen (Tylenol) tablet 650 mg, 650 mg, oral, 4x daily, Maylin Rosas MD, 650 mg at 10/02/23 0636    dexAMETHasone (Decadron) injection 2 mg, 2 mg, intravenous, q8h CAMILA, Maylin Rosas MD, 2 mg at 10/02/23 0553    hydrALAZINE (Apresoline) injection 10 mg, 10 mg, intravenous, q20 min PRN, Maylin Rosas MD    labetaloL (Normodyne,Trandate) injection 10 mg, 10 mg, intravenous, q10 min PRN, Maylin Rosas MD    levETIRAcetam in NaCl (iso-os) (Keppra)  mg, 500 mg, intravenous, q12h, Maylin Rosas MD, Stopped at 10/02/23 0608    niCARdipine (Cardene) 40 mg in sodium chloride 200 mL (0.2 mg/mL) infusion (premix), 1-15 mg/hr, intravenous, Continuous PRN, Maylin Rosas MD    niMODipine (Nimotop) capsule 60 mg, 60 mg, oral, q4h, 60 mg at 10/02/23 0637 **OR** niMODipine liquid 60 mg, 60 mg, oral, q4h, Maylin Rosas MD, 60 mg at 10/02/23 0335    oxyCODONE (Roxicodone) immediate " release tablet 5 mg, 5 mg, oral, q6h PRN, Maylin Rosas MD, 5 mg at 10/02/23 0233    polyethylene glycol (Glycolax, Miralax) packet 17 g, 17 g, oral, Daily, Maylin Rosas MD    sennosides-docusate sodium (Yandy-Colace) 8.6-50 mg per tablet 2 tablet, 2 tablet, oral, BID, Maylin Rosas MD, 2 tablet at 10/01/23 2032     Lab Results  Lab Results   Component Value Date    WBC 13.7 (H) 10/03/2023    HGB 11.1 (L) 10/03/2023    HCT 32.4 (L) 10/03/2023    MCV 89 10/03/2023     10/03/2023     Lab Results   Component Value Date    GLUCOSE 103 (H) 10/03/2023    CALCIUM 9.3 10/03/2023     (H) 10/03/2023    K 4.0 10/03/2023    CO2 24 10/03/2023     (H) 10/03/2023    BUN 17 10/03/2023    CREATININE 0.65 10/03/2023     Imaging Results  CTH 10/1/23 basilar SAH    CTA 10/1/23 5-6 mm anterosuperior oriented basilar tip aneurysm is present,  with slightly irregular appearance of the aneurysm near the dome.     Assessment/Plan   Reshma Landers is a 37 y.o. female h/o tobacco use p/w WHOL on 9/29, CTH basal sylvian Subarachnoid hemorrhage (CMS/HCC) (HH2, mF3), PBD3, CTA 6mm basilar tip aneurysm, 10/2 s/p coil embolization of basilar tip aneurysm    Principal Problem:    Subarachnoid hemorrhage (CMS/HCC)      NEURO:  #SAH  Assessment:  -Neurologically: stable  -TCDs   Plan:  -NSU  -Q1H neuro checks  -Continue Nimodipine 60q4 x 21 days  -Discontinue Keppra   -Continue Dex 2q8 for HA  -Pain: acetaminophen, oxycodone, hydromorphone PRN  -Sedation: none  -Nausea: ondansetron PRN  -Repeat angio Monday 10/9  -TCD daily, strict I/O  -PT/OT    CARDIOVASCULAR:  #No active issues  Assessment:  - Stable     Plan:  -Continue to monitor on telemetry  -TTE today  -Goal liberalized to SBP < 200    -->Nicardipine, Hydralazine and Labetalol PRN    RESPIRATORY:  #No active issues  Assessment:  -Stable  Plan:  -Continuous pulse oximetry   -O2 PRN to maintain SpO2 > 94%, wean as tolerated  -Incentive spirometry Q2H while  awake    RENAL/:  #No active issues  Assessment:  -Baseline BUN/Cr: 12/0.75  -BUN/Cr: stable     Plan:  -Monitor with daily RFP  - Strict I/O    FEN/GI:  Assessment:  -Last BM: PTA  Plan:  -Monitor and replace electrolytes per protocol  -Diet: Regular diet   -Bowel Regimen: Docusate-Senna, Miralax     ENDOCRINE:  # No active issues  Assessment:  -B, stable   Plan:  -Accuchecks & ISS Q6H    HEMATOLOGY:  #No active issues  Assessment:  -Baseline Hgb: 13.7  -Baseline Plts: 281  -Hgb: slight drop in hemoglobin  Lab Results   Component Value Date    HGB 11.1 (L) 10/03/2023      -Plts:   Lab Results   Component Value Date     10/03/2023   Plan:  -Continue to monitor with daily CBC and Coag panel    INFECTIOUS DISEASE:  #leukocytosis  Assessment:  -WBC downtrending but elevated likely due to dex  -Afebrile  Lab Results   Component Value Date    WBC 13.7 (H) 10/03/2023     Plan:  -Continue to monitor for s/sx of infection  -Pan culture for temperature > 38.4 C    MUSCULOSKELETAL:  -No acute issues    SKIN:  -No acute issues  -Turns and skin care per NSU protocol    ACCESS:  - PIV x 2  - L radial a-line    PROPHYLAXIS:  -DVT/VTE: SCDs, SQH  -GI: Protonix    RESTRAINTS:  Not indicated/Patient does not meet criteria for restraints    Boaz Lange MD  Neuroscience Intensive Care    The patient is critically ill with aneurysmal subarachnoid hemorrhage  Neurologically stable following coil embolization of aneurysm  Cont BP control with goal sbp<140  Cont nimodipine, euvolemia therapy, permissive hypertension  TTE when available  Encourage oral intake        I have seen and examined the patient.  I have reviewed the patient's laboratory, radiographic, and clinical data.  I have spent 30 minutes providing critical care for the patient.       CARMELA Rehman M.D.

## 2023-10-03 NOTE — CARE PLAN
The patient's goals for the shift include to have decreased headache pain.    The clinical goals for the shift include vitals to be within normal limits.

## 2023-10-03 NOTE — CARE PLAN
Problem: COGNITION/SAFETY  Goal: Pt will complete >/= 2 cognitive assessments (Medi-Cog, MOCA, TFLS, ect.) with score WFL.  Outcome: Progressing     Problem: IADLs  Goal: Pt will perform 100% of selected IADL task with IND including item retrieval, setup, task engagement and corrections, and clean-up.  Outcome: Progressing     Problem: TRANSFERS  Goal: Pt will perform transfers to/from bed, chair, and toilet with IND.  Outcome: Progressing  Goal: Pt will perform all bed mobility with IND.  Outcome: Progressing     Problem: BALANCE  Goal: Pt will perform ADL while reaching outside TOMI and returning self to midline >8 minutes with IND.  Outcome: Progressing

## 2023-10-04 ENCOUNTER — APPOINTMENT (OUTPATIENT)
Dept: VASCULAR MEDICINE | Facility: HOSPITAL | Age: 38
DRG: 022 | End: 2023-10-04
Payer: COMMERCIAL

## 2023-10-04 LAB
ALBUMIN SERPL BCP-MCNC: 4.7 G/DL (ref 3.4–5)
ANION GAP SERPL CALC-SCNC: 18 MMOL/L (ref 10–20)
BUN SERPL-MCNC: 12 MG/DL (ref 6–23)
CALCIUM SERPL-MCNC: 10 MG/DL (ref 8.6–10.6)
CHLORIDE SERPL-SCNC: 107 MMOL/L (ref 98–107)
CO2 SERPL-SCNC: 23 MMOL/L (ref 21–32)
CREAT SERPL-MCNC: 0.57 MG/DL (ref 0.5–1.05)
ERYTHROCYTE [DISTWIDTH] IN BLOOD BY AUTOMATED COUNT: 12.7 % (ref 11.5–14.5)
GFR SERPL CREATININE-BSD FRML MDRD: >90 ML/MIN/1.73M*2
GLUCOSE BLD MANUAL STRIP-MCNC: 135 MG/DL (ref 74–99)
GLUCOSE BLD MANUAL STRIP-MCNC: 163 MG/DL (ref 74–99)
GLUCOSE BLD MANUAL STRIP-MCNC: 181 MG/DL (ref 74–99)
GLUCOSE SERPL-MCNC: 146 MG/DL (ref 74–99)
HCT VFR BLD AUTO: 35.5 % (ref 36–46)
HGB BLD-MCNC: 12.3 G/DL (ref 12–16)
MAGNESIUM SERPL-MCNC: 1.72 MG/DL (ref 1.6–2.4)
MCH RBC QN AUTO: 29.9 PG (ref 26–34)
MCHC RBC AUTO-ENTMCNC: 34.6 G/DL (ref 32–36)
MCV RBC AUTO: 86 FL (ref 80–100)
NRBC BLD-RTO: 0 /100 WBCS (ref 0–0)
PHOSPHATE SERPL-MCNC: 4.1 MG/DL (ref 2.5–4.9)
PLATELET # BLD AUTO: 304 X10*3/UL (ref 150–450)
PMV BLD AUTO: 10 FL (ref 7.5–11.5)
POTASSIUM SERPL-SCNC: 3.9 MMOL/L (ref 3.5–5.3)
RBC # BLD AUTO: 4.11 X10*6/UL (ref 4–5.2)
SODIUM SERPL-SCNC: 144 MMOL/L (ref 136–145)
WBC # BLD AUTO: 14.4 X10*3/UL (ref 4.4–11.3)

## 2023-10-04 PROCEDURE — 2500000002 HC RX 250 W HCPCS SELF ADMINISTERED DRUGS (ALT 637 FOR MEDICARE OP, ALT 636 FOR OP/ED): Performed by: STUDENT IN AN ORGANIZED HEALTH CARE EDUCATION/TRAINING PROGRAM

## 2023-10-04 PROCEDURE — 37799 UNLISTED PX VASCULAR SURGERY: CPT

## 2023-10-04 PROCEDURE — S4991 NICOTINE PATCH NONLEGEND: HCPCS | Performed by: STUDENT IN AN ORGANIZED HEALTH CARE EDUCATION/TRAINING PROGRAM

## 2023-10-04 PROCEDURE — 83735 ASSAY OF MAGNESIUM: CPT

## 2023-10-04 PROCEDURE — 99291 CRITICAL CARE FIRST HOUR: CPT | Performed by: NEUROLOGICAL SURGERY

## 2023-10-04 PROCEDURE — 2500000002 HC RX 250 W HCPCS SELF ADMINISTERED DRUGS (ALT 637 FOR MEDICARE OP, ALT 636 FOR OP/ED)

## 2023-10-04 PROCEDURE — 2500000004 HC RX 250 GENERAL PHARMACY W/ HCPCS (ALT 636 FOR OP/ED): Performed by: STUDENT IN AN ORGANIZED HEALTH CARE EDUCATION/TRAINING PROGRAM

## 2023-10-04 PROCEDURE — 93886 INTRACRANIAL COMPLETE STUDY: CPT

## 2023-10-04 PROCEDURE — 80069 RENAL FUNCTION PANEL: CPT

## 2023-10-04 PROCEDURE — 2500000004 HC RX 250 GENERAL PHARMACY W/ HCPCS (ALT 636 FOR OP/ED)

## 2023-10-04 PROCEDURE — 85027 COMPLETE CBC AUTOMATED: CPT

## 2023-10-04 PROCEDURE — 2020000001 HC ICU ROOM DAILY

## 2023-10-04 PROCEDURE — 93886 INTRACRANIAL COMPLETE STUDY: CPT | Performed by: PSYCHIATRY & NEUROLOGY

## 2023-10-04 PROCEDURE — 96372 THER/PROPH/DIAG INJ SC/IM: CPT | Performed by: STUDENT IN AN ORGANIZED HEALTH CARE EDUCATION/TRAINING PROGRAM

## 2023-10-04 PROCEDURE — 96372 THER/PROPH/DIAG INJ SC/IM: CPT

## 2023-10-04 PROCEDURE — 82947 ASSAY GLUCOSE BLOOD QUANT: CPT

## 2023-10-04 PROCEDURE — 2500000001 HC RX 250 WO HCPCS SELF ADMINISTERED DRUGS (ALT 637 FOR MEDICARE OP): Performed by: STUDENT IN AN ORGANIZED HEALTH CARE EDUCATION/TRAINING PROGRAM

## 2023-10-04 RX ORDER — IBUPROFEN 200 MG
1 TABLET ORAL DAILY
Status: DISCONTINUED | OUTPATIENT
Start: 2023-10-04 | End: 2023-10-10 | Stop reason: HOSPADM

## 2023-10-04 RX ADMIN — SENNOSIDES AND DOCUSATE SODIUM 2 TABLET: 50; 8.6 TABLET ORAL at 20:37

## 2023-10-04 RX ADMIN — NIMODIPINE 60 MG: 30 CAPSULE, LIQUID FILLED ORAL at 05:52

## 2023-10-04 RX ADMIN — ACETAMINOPHEN 650 MG: 325 TABLET, FILM COATED ORAL at 08:51

## 2023-10-04 RX ADMIN — ACETAMINOPHEN 650 MG: 325 TABLET, FILM COATED ORAL at 02:10

## 2023-10-04 RX ADMIN — OXYCODONE HYDROCHLORIDE 5 MG: 5 TABLET ORAL at 20:37

## 2023-10-04 RX ADMIN — HEPARIN SODIUM 5000 UNITS: 5000 INJECTION INTRAVENOUS; SUBCUTANEOUS at 21:28

## 2023-10-04 RX ADMIN — HEPARIN SODIUM 5000 UNITS: 5000 INJECTION INTRAVENOUS; SUBCUTANEOUS at 13:49

## 2023-10-04 RX ADMIN — NIMODIPINE 60 MG: 30 CAPSULE, LIQUID FILLED ORAL at 17:28

## 2023-10-04 RX ADMIN — ACETAMINOPHEN 650 MG: 325 TABLET, FILM COATED ORAL at 20:37

## 2023-10-04 RX ADMIN — DEXAMETHASONE SODIUM PHOSPHATE 2 MG: 4 INJECTION, SOLUTION INTRAMUSCULAR; INTRAVENOUS at 05:52

## 2023-10-04 RX ADMIN — Medication 1 PATCH: at 08:51

## 2023-10-04 RX ADMIN — MAGNESIUM SULFATE HEPTAHYDRATE 2 G: 40 INJECTION, SOLUTION INTRAVENOUS at 12:03

## 2023-10-04 RX ADMIN — ACETAMINOPHEN 650 MG: 325 TABLET, FILM COATED ORAL at 13:49

## 2023-10-04 RX ADMIN — PANTOPRAZOLE SODIUM 20 MG: 20 TABLET, DELAYED RELEASE ORAL at 07:10

## 2023-10-04 RX ADMIN — POTASSIUM CHLORIDE 20 MEQ: 1500 TABLET, EXTENDED RELEASE ORAL at 12:03

## 2023-10-04 RX ADMIN — HEPARIN SODIUM 5000 UNITS: 5000 INJECTION INTRAVENOUS; SUBCUTANEOUS at 05:52

## 2023-10-04 RX ADMIN — INSULIN LISPRO 2 UNITS: 100 INJECTION, SOLUTION INTRAVENOUS; SUBCUTANEOUS at 17:28

## 2023-10-04 RX ADMIN — NIMODIPINE 60 MG: 30 CAPSULE, LIQUID FILLED ORAL at 02:10

## 2023-10-04 RX ADMIN — NIMODIPINE 60 MG: 30 CAPSULE, LIQUID FILLED ORAL at 13:49

## 2023-10-04 RX ADMIN — SENNOSIDES AND DOCUSATE SODIUM 2 TABLET: 50; 8.6 TABLET ORAL at 08:51

## 2023-10-04 RX ADMIN — NIMODIPINE 60 MG: 30 CAPSULE, LIQUID FILLED ORAL at 10:14

## 2023-10-04 RX ADMIN — OXYCODONE HYDROCHLORIDE 5 MG: 5 TABLET ORAL at 07:30

## 2023-10-04 RX ADMIN — OXYCODONE HYDROCHLORIDE 5 MG: 5 TABLET ORAL at 13:49

## 2023-10-04 RX ADMIN — NIMODIPINE 60 MG: 30 CAPSULE, LIQUID FILLED ORAL at 21:28

## 2023-10-04 ASSESSMENT — PAIN SCALES - GENERAL
PAINLEVEL_OUTOF10: 5 - MODERATE PAIN
PAINLEVEL_OUTOF10: 7
PAINLEVEL_OUTOF10: 2
PAINLEVEL_OUTOF10: 7
PAINLEVEL_OUTOF10: 5 - MODERATE PAIN
PAINLEVEL_OUTOF10: 5 - MODERATE PAIN

## 2023-10-04 ASSESSMENT — PAIN - FUNCTIONAL ASSESSMENT
PAIN_FUNCTIONAL_ASSESSMENT: 0-10

## 2023-10-04 ASSESSMENT — PAIN DESCRIPTION - DESCRIPTORS
DESCRIPTORS: ACHING
DESCRIPTORS: ACHING

## 2023-10-04 NOTE — CARE PLAN
Problem: Safety - Adult  Goal: Free from fall injury  Outcome: Progressing     Problem: General Stroke  Goal: Demonstrate improvement in neurological exam throughout the shift  Outcome: Progressing  Goal: Maintain BP within ordered limits throughout shift  Outcome: Progressing   The patient's goals for the shift include to have decreased headache pain.    The clinical goals for the shift include Patient will have Sbp <200 during shift    Over the shift, the patient did not make progress toward the following goals.

## 2023-10-04 NOTE — PROGRESS NOTES
Social Work Discharge Planning note:    -Patient discussed during interdisciplinary rounds.   -Team members present: Fellow, PT and OT, and SW  -Plan per medical team: Pt is not medically ready for discharge. Repeat angio is planned for 10/9/23, and if this angio is stable Pt may be ready for discharge at that time.   -Payer: [facility]  -Status: Inpatient  -Discharge disposition: Pt is with PT discharge  recommendations for, no further acute PT. Pt plans to return home at discharge with Kan to assist as needed. SW will continue to follow for discharge planning.   -Support to Pt/family: Pt reported there to be no new issues or concerns at this time. SW will continue to follow for emotional/incidental support to Pt/family.    -Potential Barriers: none  -Anticipated Date of Discharge:  10/9/2023    JULIO C Bell, LSW

## 2023-10-04 NOTE — POST-PROCEDURE NOTE
Pre-Procedure Verification and Time Out:  Procedure Location: procedure area  HUDDLE - Pre-procedure Verification:  completed  TIME OUT - Final Verification:  completed immediately prior to procedure start  DEBRIEF: completed    General Information:   Anesthesia/ sedation: Anesthesia  Indication(s)/Pre - Procedure Diagnoses: Basilar Tip Aneurysm  Post-Procedure Diagnosis: Basilar Tip Aneurysm  Procedure Name: Coil embolization of basilar tip aneurysm  Findings: Basilar Tip Aneurysm  Procedure performed by: Oliver  Assistant(s): Christian  Estimated Blood Loss (mL): 15  Specimen: no  Informed Consent: consent obtained and in chart    Access: 6 Fr Sheath in R CFA  Closure: Mynx  Vessels Injected: L vert, R CFA  Findings: 3.3 mm x 6.4 mm basilar tip aneurysm s/p balloon-assisted coil embolization with <1mm intended neck remnant.  Full report to follow in PACS dictation

## 2023-10-04 NOTE — PROGRESS NOTES
"Hospital Day: 4    Subjective   Reported issues and events over the last 24 hours:  No acute events overnight        Objective   Exam:  No acute distress  Face symmetric  OU3R  Motor: 5/5, no dysmetria on finger to nose, no pronator drift  Sensation: SILT throughout all extremities  Incision c/d/i    Vitals:  Blood pressure 112/81, pulse 67, temperature 36.1 °C (97 °F), resp. rate 10, height 1.651 m (5' 5\"), weight 61.8 kg (136 lb 3.9 oz), SpO2 99 %.  Temp (24hrs), Av.4 °C (97.6 °F), Min:36.1 °C (97 °F), Max:36.9 °C (98.4 °F)    I/O:    Intake/Output Summary (Last 24 hours) at 10/4/2023 0159  Last data filed at 10/3/2023 1023  Gross per 24 hour   Intake 212 ml   Output --   Net 212 ml       Diagnostic Studies Reviewed:  TCDs wnl      Medications:  Scheduled Meds: acetaminophen, 650 mg, oral, 4x daily  dexAMETHasone, 2 mg, intravenous, q8h CAMILA  heparin (porcine), 5,000 Units, subcutaneous, q8h  insulin lispro, 0-10 Units, subcutaneous, TID with meals  niMODipine, 60 mg, oral, q4h   Or  niMODipine, 60 mg, oral, q4h  pantoprazole, 20 mg, oral, Daily before breakfast  perflutren protein A microsphere, 0.5 mL, intravenous, Once in imaging  polyethylene glycol, 17 g, oral, Daily  sennosides-docusate sodium, 2 tablet, oral, BID  sulfur hexafluoride microsphr, 2 mL, intravenous, Once in imaging      Continuous Infusions:    PRN Meds: PRN medications: dextrose 10 % in water (D10W), dextrose, glucagon, hydrALAZINE, labetaloL, magnesium sulfate, oxyCODONE, potassium chloride CR **OR** potassium chloride, potassium chloride CR **OR** potassium chloride, potassium chloride      Results:  Lab Results   Component Value Date    WBC 13.7 (H) 10/03/2023    HGB 11.1 (L) 10/03/2023    HCT 32.4 (L) 10/03/2023    MCV 89 10/03/2023     10/03/2023     Lab Results   Component Value Date    CREATININE 0.65 10/03/2023    BUN 17 10/03/2023     (H) 10/03/2023    K 4.0 10/03/2023     (H) 10/03/2023    CO2 24 10/03/2023 "       === 10/01/23 ===    CT HEAD NECK ANGIO W AND WO IV CONTRAST    - Impression -  1. 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  2. Slight aneurysmal appearance of the anterior cerebral artery on  sagittal imaging, although it is unremarkable in appearance on  coronal and axial slices. No additional intracranial aneurysms are  identified.  3. No evidence of large vessel occlusion in the proximal intracranial  circulation.  4. No significant stenosis is present in the large arteries of the  neck.  5. Noncontrast axial CT images of the head demonstrate small amount  of hyperdense hemorrhage present near the left carotid terminal, in  the right sylvian fissure, and a small focus in the sulci of the left  frontal lobe.    MACRO:  None    Signed by: Odalis Bailey 10/1/2023 4:03 PM  Dictation workstation:   [id number]       Assessment/Plan   Assessment:  Reshma is a 37 y.o. female with a principal problem of Subarachnoid hemorrhage (CMS/HCC).    Additional active problems include:  Principal Problem:    Subarachnoid hemorrhage (CMS/HCC)    37 year old female with h/o smoking, uncle  2/2 aneurysmal hemorrhage p/w 2 days headache and nuchal rigidity, CTH basal/Sylvian SAH, CTA 6mm basilar tip aneurysm.     10/2 s/p coil embo of basilar tip aneurysm (small remnant)    Plan:  NSU  Q1 neurochecks  Plan for repeat angiogram on Mon 10/9  SBP <200  TCDs  PTOT    Brianda Fernandez MD  PGY-2 Neurosurgery  1:59 AM

## 2023-10-04 NOTE — PROGRESS NOTES
Subjective   HPI: Reshma Landers is a 37 y.o. female h/o tobacco use p/w WHOL on 9/29, CTH basal sylvian Subarachnoid hemorrhage (CMS/HCC) (HH2, mF3), PBD4, CTA 6mm basilar tip aneurysm     Interval Events: Patient had angio, tolerated well, continues to have HA      Objective    Vitals:    10/04/23 0400 10/04/23 0605 10/04/23 0655 10/04/23 0800   BP:       Patient Position:       Pulse:  82 81 74   Resp:  13 20 18   Temp: 36.6 °C (97.8 °F)   36.3 °C (97.3 °F)   TempSrc:       SpO2:  100% 100% 100%   Weight:       Height:                Results for orders placed or performed during the hospital encounter of 10/01/23 (from the past 24 hour(s))   Transthoracic Echo (TTE) Complete   Result Value Ref Range    LV A4C EF 71.6    POCT GLUCOSE   Result Value Ref Range    POCT Glucose 154 (H) 74 - 99 mg/dL   POCT GLUCOSE   Result Value Ref Range    POCT Glucose 157 (H) 74 - 99 mg/dL   POCT GLUCOSE   Result Value Ref Range    POCT Glucose 111 (H) 74 - 99 mg/dL   Renal function panel   Result Value Ref Range    Glucose 146 (H) 74 - 99 mg/dL    Sodium 144 136 - 145 mmol/L    Potassium 3.9 3.5 - 5.3 mmol/L    Chloride 107 98 - 107 mmol/L    Bicarbonate 23 21 - 32 mmol/L    Anion Gap 18 10 - 20 mmol/L    Urea Nitrogen 12 6 - 23 mg/dL    Creatinine 0.57 0.50 - 1.05 mg/dL    eGFR >90 >60 mL/min/1.73m*2    Calcium 10.0 8.6 - 10.6 mg/dL    Phosphorus 4.1 2.5 - 4.9 mg/dL    Albumin 4.7 3.4 - 5.0 g/dL   CBC   Result Value Ref Range    WBC 14.4 (H) 4.4 - 11.3 x10*3/uL    nRBC 0.0 0.0 - 0.0 /100 WBCs    RBC 4.11 4.00 - 5.20 x10*6/uL    Hemoglobin 12.3 12.0 - 16.0 g/dL    Hematocrit 35.5 (L) 36.0 - 46.0 %    MCV 86 80 - 100 fL    MCH 29.9 26.0 - 34.0 pg    MCHC 34.6 32.0 - 36.0 g/dL    RDW 12.7 11.5 - 14.5 %    Platelets 304 150 - 450 x10*3/uL    MPV 10.0 7.5 - 11.5 fL   Magnesium   Result Value Ref Range    Magnesium 1.72 1.60 - 2.40 mg/dL   POCT GLUCOSE   Result Value Ref Range    POCT Glucose 163 (H) 74 - 99 mg/dL   Vascular US  Transcranial Doppler (TCD) Complete   Result Value Ref Range    BSA 1.68 m2        Physical Exam  NEURO:  -Alert, oriented x3  -Follows commands, 5/5 strength, no drift  CV:  -RRR on telemetry, NSR  RESP:  -Regular, unlabored  -Oxygen: Room air   :  - No catheter in place  GI:  -Abdomen NT/ND, soft  -Groin site intact, no hematoma  SKIN:  -Intact    Medications    Current Facility-Administered Medications:     acetaminophen (Tylenol) tablet 650 mg, 650 mg, oral, 4x daily, Maylin Rosas MD, 650 mg at 10/02/23 0636    dexAMETHasone (Decadron) injection 2 mg, 2 mg, intravenous, q8h CAMILA, Maylin Rosas MD, 2 mg at 10/02/23 0553    hydrALAZINE (Apresoline) injection 10 mg, 10 mg, intravenous, q20 min PRN, Maylin Rosas MD    labetaloL (Normodyne,Trandate) injection 10 mg, 10 mg, intravenous, q10 min PRN, Maylin Rosas MD    levETIRAcetam in NaCl (iso-os) (Keppra)  mg, 500 mg, intravenous, q12h, Maylin Rosas MD, Stopped at 10/02/23 0608    niCARdipine (Cardene) 40 mg in sodium chloride 200 mL (0.2 mg/mL) infusion (premix), 1-15 mg/hr, intravenous, Continuous PRN, Maylin Rosas MD    niMODipine (Nimotop) capsule 60 mg, 60 mg, oral, q4h, 60 mg at 10/02/23 0637 **OR** niMODipine liquid 60 mg, 60 mg, oral, q4h, Maylin Rosas MD, 60 mg at 10/02/23 0335    oxyCODONE (Roxicodone) immediate release tablet 5 mg, 5 mg, oral, q6h PRN, Maylin Rosas MD, 5 mg at 10/02/23 0233    polyethylene glycol (Glycolax, Miralax) packet 17 g, 17 g, oral, Daily, Maylin Rosas MD    sennosides-docusate sodium (Yandy-Colace) 8.6-50 mg per tablet 2 tablet, 2 tablet, oral, BID, Maylin Rosas MD, 2 tablet at 10/01/23 2032     Lab Results  Lab Results   Component Value Date    WBC 14.4 (H) 10/04/2023    HGB 12.3 10/04/2023    HCT 35.5 (L) 10/04/2023    MCV 86 10/04/2023     10/04/2023     Lab Results   Component Value Date    GLUCOSE 146 (H) 10/04/2023    CALCIUM 10.0 10/04/2023     10/04/2023    K 3.9  10/04/2023    CO2 23 10/04/2023     10/04/2023    BUN 12 10/04/2023    CREATININE 0.57 10/04/2023     Imaging Results  CTH 10/1/23 basilar SAH    CTA 10/1/23 5-6 mm anterosuperior oriented basilar tip aneurysm is present,  with slightly irregular appearance of the aneurysm near the dome.     Assessment/Plan   Reshma Landers is a 37 y.o. female h/o tobacco use p/w WHOL on , CTH basal sylvian Subarachnoid hemorrhage (CMS/HCC) (HH2, mF3), PBD3, CTA 6mm basilar tip aneurysm, 10/2 s/p coil embolization of basilar tip aneurysm    Principal Problem:    Subarachnoid hemorrhage (CMS/HCC)      NEURO:  #SAH  Assessment:  -Neurologically: stable  -TCDs   Plan:  -NSU  -Q1H neuro checks  -Continue Nimodipine 60q4 x 21 days  -Discontinue Keppra   -Continue Dex 2q8 for HA  -Pain: acetaminophen, oxycodone, hydromorphone PRN  -Sedation: none  -Nausea: ondansetron PRN  -Repeat angio Monday 10/9  -TCD daily  -strict I/O  -PT/OT has outpatient plan    CARDIOVASCULAR:  #No active issues  Assessment:  - Stable     Plan:  -Continue to monitor on telemetry  -TTE today  -Goal liberalized to SBP < 200    -->Nicardipine, Hydralazine and Labetalol PRN    RESPIRATORY:  #No active issues  Assessment:  -Stable  Plan:  -Continuous pulse oximetry   -O2 PRN to maintain SpO2 > 94%, wean as tolerated  -Incentive spirometry Q2H while awake    RENAL/:  #No active issues  Assessment:  -Baseline BUN/Cr: 12/0.75  -BUN/Cr: stable     Plan:  -Monitor with daily RFP  - Strict I/O    FEN/GI:  Assessment:  -Last BM: PTA  Plan:  -Monitor and replace electrolytes per protocol  -Diet: Regular diet   -Bowel Regimen: Docusate-Senna, Miralax     ENDOCRINE:  # No active issues  Assessment:  -B, stable   Plan:  -Accuchecks & ISS Q6H    HEMATOLOGY:  #No active issues  Assessment:  -Baseline Hgb: 13.7  -Baseline Plts: 281  -Hgb: slight drop in hemoglobin  Lab Results   Component Value Date    HGB 12.3 10/04/2023      -Plts:   Lab Results   Component Value  Date     10/04/2023   Plan:  -Continue to monitor with daily CBC and Coag panel    INFECTIOUS DISEASE:  #leukocytosis  Assessment:  -WBC downtrending but elevated likely due to dex  -Afebrile  Lab Results   Component Value Date    WBC 14.4 (H) 10/04/2023     Plan:  -Continue to monitor for s/sx of infection  -Pan culture for temperature > 38.4 C    MUSCULOSKELETAL:  -No acute issues    SKIN:  -No acute issues  -Turns and skin care per NSU protocol    ACCESS:  - PIV x 2  - L radial a-line    PROPHYLAXIS:  -DVT/VTE: SCDs, SQH  -GI: Protonix    RESTRAINTS:  Not indicated/Patient does not meet criteria for restraints    I have reviewed and edited the information above and I agree with the assessment and plan as outlined by the resident/medical student.    Moisés Rincon MD - 10/04/23 at 11:02 AM  Neurocritical Care Fellow  PGY-5      The patient is critically ill with aneurysmal subarachnoid hemorrhage  Neurologically stable following coil embolization of aneurysm  Cont BP control with goal sbp<140  Cont nimodipine, euvolemia therapy, permissive hypertension  Encourage oral intake    I have seen and examined the patient.  I have reviewed the patient's laboratory, radiographic, and clinical data.  I have spent 30 minutes providing critical care for the patient.       CARMELA Rehman M.D.

## 2023-10-04 NOTE — CARE PLAN
Problem: Safety - Adult  Goal: Free from fall injury  10/4/2023 1235 by Cameron Yen RN  Outcome: Progressing  10/4/2023 0956 by Cameron Yen RN  Outcome: Progressing     Problem: General Stroke  Goal: Demonstrate improvement in neurological exam throughout the shift  10/4/2023 1235 by Cameron Yen RN  Outcome: Progressing  10/4/2023 0956 by Cameron Yen RN  Outcome: Progressing  Goal: Maintain BP within ordered limits throughout shift  10/4/2023 1235 by Cameron Yen RN  Outcome: Progressing  10/4/2023 0956 by Cameron Yen RN  Outcome: Progressing  Goal: No symptoms of aspiration throughout shift  Outcome: Progressing  Goal: No symptoms of hemorrhage throughout shift  Outcome: Progressing  Goal: Tolerate enteral feeding throughout shift  Outcome: Progressing  Goal: Decreased nausea/vomiting throughout shift  Outcome: Progressing  Goal: Controlled blood glucose throughout shift  Outcome: Progressing  Goal: Out of bed three times today  Outcome: Progressing     Problem: ICU Stroke  Goal: Maintain patent airway throughout shift  Outcome: Progressing  Goal: Achieve/maintain targeted sodium level throughout shift  Outcome: Progressing     Problem: Pain - Adult  Goal: Verbalizes/displays adequate comfort level or baseline comfort level  Outcome: Met     Problem: General Stroke  Goal: Participate in treatment (ie., meds, therapy) throughout shift  Outcome: Met     The patient's goals for the shift include to have decreased headache pain.    The clinical goals for the shift include pt will maintain BP within goal throughout shift

## 2023-10-05 ENCOUNTER — APPOINTMENT (OUTPATIENT)
Dept: VASCULAR MEDICINE | Facility: HOSPITAL | Age: 38
DRG: 022 | End: 2023-10-05
Payer: COMMERCIAL

## 2023-10-05 LAB
ALBUMIN SERPL BCP-MCNC: 4 G/DL (ref 3.4–5)
ANION GAP SERPL CALC-SCNC: 14 MMOL/L (ref 10–20)
BUN SERPL-MCNC: 16 MG/DL (ref 6–23)
CALCIUM SERPL-MCNC: 9.3 MG/DL (ref 8.6–10.6)
CHLORIDE SERPL-SCNC: 107 MMOL/L (ref 98–107)
CO2 SERPL-SCNC: 25 MMOL/L (ref 21–32)
CREAT SERPL-MCNC: 0.6 MG/DL (ref 0.5–1.05)
ERYTHROCYTE [DISTWIDTH] IN BLOOD BY AUTOMATED COUNT: 12.5 % (ref 11.5–14.5)
GFR SERPL CREATININE-BSD FRML MDRD: >90 ML/MIN/1.73M*2
GLUCOSE BLD MANUAL STRIP-MCNC: 100 MG/DL (ref 74–99)
GLUCOSE BLD MANUAL STRIP-MCNC: 121 MG/DL (ref 74–99)
GLUCOSE BLD MANUAL STRIP-MCNC: 182 MG/DL (ref 74–99)
GLUCOSE SERPL-MCNC: 86 MG/DL (ref 74–99)
HCT VFR BLD AUTO: 32.8 % (ref 36–46)
HGB BLD-MCNC: 11.3 G/DL (ref 12–16)
MAGNESIUM SERPL-MCNC: 1.91 MG/DL (ref 1.6–2.4)
MCH RBC QN AUTO: 31 PG (ref 26–34)
MCHC RBC AUTO-ENTMCNC: 34.5 G/DL (ref 32–36)
MCV RBC AUTO: 90 FL (ref 80–100)
NRBC BLD-RTO: 0 /100 WBCS (ref 0–0)
PHOSPHATE SERPL-MCNC: 3.8 MG/DL (ref 2.5–4.9)
PLATELET # BLD AUTO: 250 X10*3/UL (ref 150–450)
PMV BLD AUTO: 10.1 FL (ref 7.5–11.5)
POTASSIUM SERPL-SCNC: 3.8 MMOL/L (ref 3.5–5.3)
RBC # BLD AUTO: 3.65 X10*6/UL (ref 4–5.2)
SODIUM SERPL-SCNC: 142 MMOL/L (ref 136–145)
WBC # BLD AUTO: 12.8 X10*3/UL (ref 4.4–11.3)

## 2023-10-05 PROCEDURE — 2500000005 HC RX 250 GENERAL PHARMACY W/O HCPCS: Performed by: REGISTERED NURSE

## 2023-10-05 PROCEDURE — 2500000002 HC RX 250 W HCPCS SELF ADMINISTERED DRUGS (ALT 637 FOR MEDICARE OP, ALT 636 FOR OP/ED): Performed by: STUDENT IN AN ORGANIZED HEALTH CARE EDUCATION/TRAINING PROGRAM

## 2023-10-05 PROCEDURE — 97116 GAIT TRAINING THERAPY: CPT | Mod: GP

## 2023-10-05 PROCEDURE — 97110 THERAPEUTIC EXERCISES: CPT | Mod: GP

## 2023-10-05 PROCEDURE — 2500000004 HC RX 250 GENERAL PHARMACY W/ HCPCS (ALT 636 FOR OP/ED): Performed by: STUDENT IN AN ORGANIZED HEALTH CARE EDUCATION/TRAINING PROGRAM

## 2023-10-05 PROCEDURE — 93886 INTRACRANIAL COMPLETE STUDY: CPT

## 2023-10-05 PROCEDURE — 83735 ASSAY OF MAGNESIUM: CPT

## 2023-10-05 PROCEDURE — 82947 ASSAY GLUCOSE BLOOD QUANT: CPT

## 2023-10-05 PROCEDURE — 96372 THER/PROPH/DIAG INJ SC/IM: CPT | Performed by: STUDENT IN AN ORGANIZED HEALTH CARE EDUCATION/TRAINING PROGRAM

## 2023-10-05 PROCEDURE — 2500000004 HC RX 250 GENERAL PHARMACY W/ HCPCS (ALT 636 FOR OP/ED)

## 2023-10-05 PROCEDURE — 85027 COMPLETE CBC AUTOMATED: CPT

## 2023-10-05 PROCEDURE — 37799 UNLISTED PX VASCULAR SURGERY: CPT

## 2023-10-05 PROCEDURE — 93886 INTRACRANIAL COMPLETE STUDY: CPT | Performed by: PSYCHIATRY & NEUROLOGY

## 2023-10-05 PROCEDURE — 2020000001 HC ICU ROOM DAILY

## 2023-10-05 PROCEDURE — S4991 NICOTINE PATCH NONLEGEND: HCPCS | Performed by: STUDENT IN AN ORGANIZED HEALTH CARE EDUCATION/TRAINING PROGRAM

## 2023-10-05 PROCEDURE — 99291 CRITICAL CARE FIRST HOUR: CPT | Performed by: NEUROLOGICAL SURGERY

## 2023-10-05 PROCEDURE — 2500000001 HC RX 250 WO HCPCS SELF ADMINISTERED DRUGS (ALT 637 FOR MEDICARE OP): Performed by: STUDENT IN AN ORGANIZED HEALTH CARE EDUCATION/TRAINING PROGRAM

## 2023-10-05 PROCEDURE — 80069 RENAL FUNCTION PANEL: CPT

## 2023-10-05 RX ORDER — LIDOCAINE 560 MG/1
1 PATCH PERCUTANEOUS; TOPICAL; TRANSDERMAL DAILY
Status: DISCONTINUED | OUTPATIENT
Start: 2023-10-05 | End: 2023-10-10

## 2023-10-05 RX ADMIN — LIDOCAINE 1 PATCH: 4 PATCH TOPICAL at 21:01

## 2023-10-05 RX ADMIN — PANTOPRAZOLE SODIUM 20 MG: 20 TABLET, DELAYED RELEASE ORAL at 06:37

## 2023-10-05 RX ADMIN — NIMODIPINE 60 MG: 30 CAPSULE, LIQUID FILLED ORAL at 17:31

## 2023-10-05 RX ADMIN — NIMODIPINE 60 MG: 30 CAPSULE, LIQUID FILLED ORAL at 10:19

## 2023-10-05 RX ADMIN — SENNOSIDES AND DOCUSATE SODIUM 2 TABLET: 50; 8.6 TABLET ORAL at 08:46

## 2023-10-05 RX ADMIN — NIMODIPINE 60 MG: 30 CAPSULE, LIQUID FILLED ORAL at 21:01

## 2023-10-05 RX ADMIN — HEPARIN SODIUM 5000 UNITS: 5000 INJECTION INTRAVENOUS; SUBCUTANEOUS at 06:37

## 2023-10-05 RX ADMIN — NIMODIPINE 60 MG: 30 CAPSULE, LIQUID FILLED ORAL at 02:07

## 2023-10-05 RX ADMIN — HEPARIN SODIUM 5000 UNITS: 5000 INJECTION INTRAVENOUS; SUBCUTANEOUS at 21:03

## 2023-10-05 RX ADMIN — OXYCODONE HYDROCHLORIDE 5 MG: 5 TABLET ORAL at 08:02

## 2023-10-05 RX ADMIN — SENNOSIDES AND DOCUSATE SODIUM 2 TABLET: 50; 8.6 TABLET ORAL at 20:38

## 2023-10-05 RX ADMIN — ACETAMINOPHEN 650 MG: 325 TABLET, FILM COATED ORAL at 13:52

## 2023-10-05 RX ADMIN — ACETAMINOPHEN 650 MG: 325 TABLET, FILM COATED ORAL at 02:07

## 2023-10-05 RX ADMIN — ACETAMINOPHEN 650 MG: 325 TABLET, FILM COATED ORAL at 20:38

## 2023-10-05 RX ADMIN — POTASSIUM CHLORIDE 20 MEQ: 1500 TABLET, EXTENDED RELEASE ORAL at 06:37

## 2023-10-05 RX ADMIN — Medication 1 PATCH: at 08:46

## 2023-10-05 RX ADMIN — NIMODIPINE 60 MG: 30 CAPSULE, LIQUID FILLED ORAL at 06:37

## 2023-10-05 RX ADMIN — OXYCODONE HYDROCHLORIDE 5 MG: 5 TABLET ORAL at 17:33

## 2023-10-05 RX ADMIN — HEPARIN SODIUM 5000 UNITS: 5000 INJECTION INTRAVENOUS; SUBCUTANEOUS at 13:52

## 2023-10-05 RX ADMIN — NIMODIPINE 60 MG: 30 CAPSULE, LIQUID FILLED ORAL at 13:52

## 2023-10-05 RX ADMIN — ACETAMINOPHEN 650 MG: 325 TABLET, FILM COATED ORAL at 10:19

## 2023-10-05 ASSESSMENT — PAIN SCALES - GENERAL
PAINLEVEL_OUTOF10: 4
PAINLEVEL_OUTOF10: 5 - MODERATE PAIN
PAINLEVEL_OUTOF10: 3
PAINLEVEL_OUTOF10: 7
PAINLEVEL_OUTOF10: 7
PAINLEVEL_OUTOF10: 0 - NO PAIN
PAINLEVEL_OUTOF10: 7
PAINLEVEL_OUTOF10: 5 - MODERATE PAIN
PAINLEVEL_OUTOF10: 4
PAINLEVEL_OUTOF10: 5 - MODERATE PAIN

## 2023-10-05 ASSESSMENT — COGNITIVE AND FUNCTIONAL STATUS - GENERAL
MOBILITY SCORE: 20
STANDING UP FROM CHAIR USING ARMS: A LITTLE
MOVING TO AND FROM BED TO CHAIR: A LITTLE
WALKING IN HOSPITAL ROOM: A LITTLE
CLIMB 3 TO 5 STEPS WITH RAILING: A LITTLE

## 2023-10-05 ASSESSMENT — PAIN - FUNCTIONAL ASSESSMENT
PAIN_FUNCTIONAL_ASSESSMENT: 0-10

## 2023-10-05 ASSESSMENT — PAIN DESCRIPTION - DESCRIPTORS: DESCRIPTORS: ACHING

## 2023-10-05 NOTE — HOSPITAL COURSE
37 year old female with h/o smoking, uncle  2/ aneurysmal hemorrhage presented 10/1/23 with  2 days headache and nuchal rigidity, CTH basal/Sylvian SAH, CTA 6mm basilar tip aneurysm.  Patient was taken 10/2/23 for coil embolization of basilar tip aneurysm.  10/3/23 TTE EF 65%.  Angio. On 10/9 angio. Showed stable remnant.  Patient was discharged to home in satisfactory condition with follow up being arranged by Dr. Boyd's office.

## 2023-10-05 NOTE — PROGRESS NOTES
"Hospital Day: 5    Subjective   Reported issues and events over the last 24 hours: PARTHA     Objective   Exam:  No acute distress  Face symmetric  OU3R  Motor: 5/5, no dysmetria on finger to nose, no pronator drift  Sensation: SILT throughout all extremities  Incision c/d/i    Vitals:  Blood pressure 112/81, pulse 56, temperature 37.3 °C (99.1 °F), temperature source Temporal, resp. rate 18, height 1.651 m (5' 5\"), weight 58.5 kg (128 lb 15.5 oz), SpO2 97 %.  Temp (24hrs), Av.7 °C (98 °F), Min:36.3 °C (97.3 °F), Max:37.3 °C (99.1 °F)    I/O:    Intake/Output Summary (Last 24 hours) at 10/5/2023 0433  Last data filed at 10/4/2023 2000  Gross per 24 hour   Intake 2230 ml   Output 875 ml   Net 1355 ml       Diagnostic Studies Reviewed:  TCDs wnl      Medications:  Scheduled Meds: acetaminophen, 650 mg, oral, 4x daily  heparin (porcine), 5,000 Units, subcutaneous, q8h  insulin lispro, 0-10 Units, subcutaneous, TID with meals  nicotine, 1 patch, transdermal, Daily  niMODipine, 60 mg, oral, q4h   Or  niMODipine, 60 mg, oral, q4h  pantoprazole, 20 mg, oral, Daily before breakfast  perflutren protein A microsphere, 0.5 mL, intravenous, Once in imaging  polyethylene glycol, 17 g, oral, Daily  sennosides-docusate sodium, 2 tablet, oral, BID  sulfur hexafluoride microsphr, 2 mL, intravenous, Once in imaging      Continuous Infusions:    PRN Meds: PRN medications: dextrose 10 % in water (D10W), dextrose, glucagon, hydrALAZINE, labetaloL, magnesium sulfate, oxyCODONE, potassium chloride CR **OR** potassium chloride, potassium chloride CR **OR** potassium chloride, potassium chloride      Results:  Lab Results   Component Value Date    WBC 12.8 (H) 10/05/2023    HGB 11.3 (L) 10/05/2023    HCT 32.8 (L) 10/05/2023    MCV 90 10/05/2023     10/05/2023     Lab Results   Component Value Date    CREATININE 0.60 10/05/2023    BUN 16 10/05/2023     10/05/2023    K 3.8 10/05/2023     10/05/2023    CO2 25 10/05/2023 "       === 10/01/23 ===    CT HEAD NECK ANGIO W AND WO IV CONTRAST    - Impression -  1. 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  2. Slight aneurysmal appearance of the anterior cerebral artery on  sagittal imaging, although it is unremarkable in appearance on  coronal and axial slices. No additional intracranial aneurysms are  identified.  3. No evidence of large vessel occlusion in the proximal intracranial  circulation.  4. No significant stenosis is present in the large arteries of the  neck.  5. Noncontrast axial CT images of the head demonstrate small amount  of hyperdense hemorrhage present near the left carotid terminal, in  the right sylvian fissure, and a small focus in the sulci of the left  frontal lobe.    MACRO:  None    Signed by: Odalis Bailey 10/1/2023 4:03 PM  Dictation workstation:   [id number]       Assessment/Plan   Assessment:  Reshma is a 37 y.o. female with a principal problem of Subarachnoid hemorrhage (CMS/HCC).    Additional active problems include:  Principal Problem:    Subarachnoid hemorrhage (CMS/HCC)    37 year old female with h/o smoking, uncle  2/2 aneurysmal hemorrhage p/w 2 days headache and nuchal rigidity, CTH basal/Sylvian SAH, CTA 6mm basilar tip aneurysm.     10/2 s/p coil embo of basilar tip aneurysm (small remnant)    Plan:  NSU  Q1 neurochecks  Plan for repeat angiogram on Mon 10/9  SBP <200  Keep euvolemic  TCDs  PTOT    4:33 Seth Landers is a 37 y.o. female on day 4 of admission presenting with Subarachnoid hemorrhage (CMS/HCC).      Maylin Rosas MD

## 2023-10-05 NOTE — PROGRESS NOTES
"Physical Therapy    Physical Therapy Treatment    Patient Name: Reshma Landers  MRN: [id number]  Today's Date: 10/5/2023  Time Calculation  Start Time: 1506  Stop Time: 1533  Time Calculation (min): 27 min       Assessment/Plan   PT Assessment  PT Assessment Results: Decreased mobility  Rehab Prognosis: Good  Barriers to Discharge: None  Evaluation/Treatment Tolerance: Patient tolerated treatment well  Strengths: Support and attitude of living partners  End of Session Communication: Bedside nurse  End of Session Patient Position: Up in chair  PT Plan  Inpatient/Swing Bed or Outpatient: Inpatient  PT Plan  Treatment/Interventions: Bed mobility, Transfer training, Gait training, Stair training, Balance training, Neuromuscular re-education, Strengthening, Endurance training, Therapeutic exercise, Therapeutic activity, Home exercise program  PT Plan: Skilled PT  PT Frequency: 3 times per week  PT Discharge Recommendations:  (Continue to anticipate no further PT needs at d/c.)  PT Recommended Transfer Status: Stand by assist      General Visit Information:   PT  Visit  PT Received On: 10/05/23  Response to Previous Treatment: Patient with no complaints from previous session.  General  Reason for Referral: Basal sylvian SAH (HH2, MF3), PBD4, CTA 6 mm basilar top aneurysm s/p coil embolization 10/2/23  Past Medical History Relevant to Rehab: tobacco use  Family/Caregiver Present: Yes  Prior to Session Communication: Bedside nurse  Patient Position Received: Up in chair  General Comment: Pt reports sudden onset of vertigo earlier this date; Reports \"a doctor\" arrived in her room to address this via repositoining maneuver. She denies any vertiginous symptoms since then.    Subjective   Precautions:  Precautions  Hearing/Visual Limitations: None  Precautions Comment: SBP <200  Vital Signs:  Vital Signs  Heart Rate: 78  SpO2: 98 %  BP: 113/83  MAP (mmHg): 94  Patient Position: Lying (Standing, durin/84 (98), 90 bpm, 99%. " Sitting, after: 133/86 (96), 94 bpm, 98%)    Objective   Pain:  Pain Assessment  Pain Assessment: 0-10  Pain Score: 5 - Moderate pain  Pain Location: Head  Pain Onset: Ongoing  Cognition:  Cognition  Overall Cognitive Status: Within Functional Limits  Orientation Level: Oriented X4  Postural Control:  Postural Control  Postural Control: Within Functional Limits  Head Control: WFL  Trunk Control: WFL    Activity Tolerance:  Activity Tolerance  Endurance: Tolerates 10 - 20 min exercise with multiple rests  Treatments:  Therapeutic Exercise  Therapeutic Exercise Performed:  (Verbal/tactile cues for proper form: Hamstrings stretch 2x60 sec each LE, 2x10 sit to stand from chair)         Bed Mobility  Bed Mobility: Yes  Bed Mobility 1  Bed Mobility 1: Supine to sitting  Level of Assistance 1: Independent  Bed Mobility Comments 1: HOB elevated.  Bed Mobility 2  Bed Mobility  2: Sitting to supine  Level of Assistance 2: Independent  Bed Mobility Comments 2: HOB elevated.    Ambulation/Gait Training  Ambulation/Gait Training Performed: Yes  Ambulation/Gait Training 1  Surface 1: Level tile  Device 1: No device  Assistance 1: Close supervision  Quality of Gait 1:  (Gait mechanics grossly WFL. See FGA for additional details.)  Comments/Distance (ft) 1: 200  Transfers  Transfer: Yes  Transfer 1  Transfer From 1: Sit to  Transfer to 1: Stand  Technique 1: Sit to stand  Transfer Level of Assistance 1: Close supervision  Trials/Comments 1: Multiple times from low surfaces. Cues for self-monitoring for activity tolerance.  Transfers 2  Transfer From 2: Stand to  Transfer to 2: Sit  Technique 2: Sit to stand  Transfer Level of Assistance 2: Close supervision  Trials/Comments 2: Multiple times from low surfaces. Cues for self-monitoring for activity tolerance.    Stairs  Stairs: Yes  Stairs  Rails 1: Right  Assistance 1: Close supervision  Comment/Number of Steps 1: 8         Outcome Measures:  [facility] Basic Mobility  Turning from your  back to your side while in a flat bed without using bedrails: None  Moving from lying on your back to sitting on the side of a flat bed without using bedrails: None  Moving to and from bed to chair (including a wheelchair): A little  Standing up from a chair using your arms (e.g. wheelchair or bedside chair): A little  To walk in hospital room: A little  Climbing 3-5 steps with railing: A little  Basic Mobility - Total Score: 20    FSS-ICU  Ambulation: Walks >/ or equal to 150 feet with supervision  Rolling: Complete independence  Sitting: Complete independence  Transfer Sit-to-Stand: Supervision or set-up only  Transfer Supine-to-Sit: Complete independence  Total Score: 31      E = Exercise and Early Mobility  Current Activity: Ambulating in irizarry    Other Measures  FGA - Functional Gait Assessment  Gait with vertical head turns: 3  Gait and pivot turn: 3  Step over obstacle: 2  Gait with narrow base of support: 3  Gait with eyes closed: 3  Ambulating backwards: 3  Steps: 1    Education Documentation  Home Exercise Program, taught by Karma Shirley PT at 10/5/2023  4:00 PM.  Learner: Patient  Readiness: Eager  Method: Explanation  Response: Verbalizes Understanding    Mobility Training, taught by Karma Shirley PT at 10/5/2023  4:00 PM.  Learner: Patient  Readiness: Eager  Method: Explanation  Response: Verbalizes Understanding    Education Comments  No comments found.        OP EDUCATION:       Encounter Problems       Encounter Problems (Active)       Balance       FGA  (Progressing)       Start:  10/03/23    Expected End:  10/17/23       Patient will score >22/30 points on the Functional Gait Assessment to indicate decreased risk of falling.            Mobility       Gait (Progressing)       Start:  10/03/23    Expected End:  10/17/23       Patient will ambulate at least 1000  ft. independently to improve tolerance of community distances.          Stairs (Progressing)       Start:   10/03/23    Expected End:  10/17/23       Patient will ascend and descend >/= 10 steps with railing to facilitate safe navigation of stairs in the home.             Pain - Adult          Transfers       Transfers (Progressing)       Start:  10/03/23    Expected End:  10/17/23       Patient will perform sit to stand and stand to sit transfers independently to increase functional strength.

## 2023-10-05 NOTE — PROGRESS NOTES
Subjective   HPI: Reshma Landers is a 37 y.o. female h/o tobacco use p/w WHOL on 9/29, CTH basal sylvian Subarachnoid hemorrhage (CMS/HCC) (HH2, mF3), PBD6, CTA 6mm basilar tip aneurysm     Interval Events: No events overnight      Objective    Vitals:    10/05/23 0300 10/05/23 0400 10/05/23 0500 10/05/23 0600   BP:       Patient Position:       Pulse: 64 58 59 57   Resp: 16 19 19 19   Temp:  37.3 °C (99.1 °F)     TempSrc:  Temporal     SpO2: 97% 97% 95% 98%   Weight:       Height:                Results for orders placed or performed during the hospital encounter of 10/01/23 (from the past 24 hour(s))   Renal function panel   Result Value Ref Range    Glucose 146 (H) 74 - 99 mg/dL    Sodium 144 136 - 145 mmol/L    Potassium 3.9 3.5 - 5.3 mmol/L    Chloride 107 98 - 107 mmol/L    Bicarbonate 23 21 - 32 mmol/L    Anion Gap 18 10 - 20 mmol/L    Urea Nitrogen 12 6 - 23 mg/dL    Creatinine 0.57 0.50 - 1.05 mg/dL    eGFR >90 >60 mL/min/1.73m*2    Calcium 10.0 8.6 - 10.6 mg/dL    Phosphorus 4.1 2.5 - 4.9 mg/dL    Albumin 4.7 3.4 - 5.0 g/dL   CBC   Result Value Ref Range    WBC 14.4 (H) 4.4 - 11.3 x10*3/uL    nRBC 0.0 0.0 - 0.0 /100 WBCs    RBC 4.11 4.00 - 5.20 x10*6/uL    Hemoglobin 12.3 12.0 - 16.0 g/dL    Hematocrit 35.5 (L) 36.0 - 46.0 %    MCV 86 80 - 100 fL    MCH 29.9 26.0 - 34.0 pg    MCHC 34.6 32.0 - 36.0 g/dL    RDW 12.7 11.5 - 14.5 %    Platelets 304 150 - 450 x10*3/uL    MPV 10.0 7.5 - 11.5 fL   Magnesium   Result Value Ref Range    Magnesium 1.72 1.60 - 2.40 mg/dL   POCT GLUCOSE   Result Value Ref Range    POCT Glucose 163 (H) 74 - 99 mg/dL   POCT GLUCOSE   Result Value Ref Range    POCT Glucose 135 (H) 74 - 99 mg/dL   POCT GLUCOSE   Result Value Ref Range    POCT Glucose 181 (H) 74 - 99 mg/dL   Renal function panel   Result Value Ref Range    Glucose 86 74 - 99 mg/dL    Sodium 142 136 - 145 mmol/L    Potassium 3.8 3.5 - 5.3 mmol/L    Chloride 107 98 - 107 mmol/L    Bicarbonate 25 21 - 32 mmol/L    Anion Gap 14 10  - 20 mmol/L    Urea Nitrogen 16 6 - 23 mg/dL    Creatinine 0.60 0.50 - 1.05 mg/dL    eGFR >90 >60 mL/min/1.73m*2    Calcium 9.3 8.6 - 10.6 mg/dL    Phosphorus 3.8 2.5 - 4.9 mg/dL    Albumin 4.0 3.4 - 5.0 g/dL   CBC   Result Value Ref Range    WBC 12.8 (H) 4.4 - 11.3 x10*3/uL    nRBC 0.0 0.0 - 0.0 /100 WBCs    RBC 3.65 (L) 4.00 - 5.20 x10*6/uL    Hemoglobin 11.3 (L) 12.0 - 16.0 g/dL    Hematocrit 32.8 (L) 36.0 - 46.0 %    MCV 90 80 - 100 fL    MCH 31.0 26.0 - 34.0 pg    MCHC 34.5 32.0 - 36.0 g/dL    RDW 12.5 11.5 - 14.5 %    Platelets 250 150 - 450 x10*3/uL    MPV 10.1 7.5 - 11.5 fL   Magnesium   Result Value Ref Range    Magnesium 1.91 1.60 - 2.40 mg/dL        Physical Exam  NEURO:  -Alert, oriented x3  -Follows commands, 5/5 strength, no drift  CV:  -RRR on telemetry, NSR  RESP:  -Regular, unlabored  -Oxygen: Room air   :  - No catheter in place  GI:  -Abdomen NT/ND, soft  -Groin site intact, no hematoma  SKIN:  -Intact    Medications    Current Facility-Administered Medications:     acetaminophen (Tylenol) tablet 650 mg, 650 mg, oral, 4x daily, Maylin Rosas MD, 650 mg at 10/02/23 0636    dexAMETHasone (Decadron) injection 2 mg, 2 mg, intravenous, q8h CAMILA, Maylin Rosas MD, 2 mg at 10/02/23 0553    hydrALAZINE (Apresoline) injection 10 mg, 10 mg, intravenous, q20 min PRN, Maylin Rosas MD    labetaloL (Normodyne,Trandate) injection 10 mg, 10 mg, intravenous, q10 min PRN, Maylin Rosas MD    levETIRAcetam in NaCl (iso-os) (Keppra)  mg, 500 mg, intravenous, q12h, Maylin Rosas MD, Stopped at 10/02/23 0608    niCARdipine (Cardene) 40 mg in sodium chloride 200 mL (0.2 mg/mL) infusion (premix), 1-15 mg/hr, intravenous, Continuous PRN, Maylin Rosas MD    niMODipine (Nimotop) capsule 60 mg, 60 mg, oral, q4h, 60 mg at 10/02/23 0637 **OR** niMODipine liquid 60 mg, 60 mg, oral, q4h, Maylin Rosas MD, 60 mg at 10/02/23 0335    oxyCODONE (Roxicodone) immediate release tablet 5 mg, 5 mg, oral, q6h  PRN, Maylin Rosas MD, 5 mg at 10/02/23 0233    polyethylene glycol (Glycolax, Miralax) packet 17 g, 17 g, oral, Daily, Maylin Rosas MD    sennosides-docusate sodium (Yandy-Colace) 8.6-50 mg per tablet 2 tablet, 2 tablet, oral, BID, Maylin Rosas MD, 2 tablet at 10/01/23 2032     Lab Results  Lab Results   Component Value Date    WBC 12.8 (H) 10/05/2023    HGB 11.3 (L) 10/05/2023    HCT 32.8 (L) 10/05/2023    MCV 90 10/05/2023     10/05/2023     Lab Results   Component Value Date    GLUCOSE 86 10/05/2023    CALCIUM 9.3 10/05/2023     10/05/2023    K 3.8 10/05/2023    CO2 25 10/05/2023     10/05/2023    BUN 16 10/05/2023    CREATININE 0.60 10/05/2023     Imaging Results  CTH 10/1/23 basilar SAH    CTA 10/1/23 5-6 mm anterosuperior oriented basilar tip aneurysm is present,  with slightly irregular appearance of the aneurysm near the dome.     Assessment/Plan   Reshma Landers is a 37 y.o. female h/o tobacco use p/w WHOL on 9/29, CTH basal sylvian Subarachnoid hemorrhage (CMS/HCC) (HH2, mF3), PBD6, CTA 6mm basilar tip aneurysm, 10/2 s/p coil embolization of basilar tip aneurysm    Principal Problem:    Subarachnoid hemorrhage (CMS/HCC)      NEURO:  #HH2 mF3 SAH PBD6, s/p coil embo of basilar tip aneurysm   Assessment:  -Neurologically: stable  Plan:  -NSU  -Q1H neuro checks  -Continue Nimodipine 60q4 x 21 days  -Discontinue Dex 2q8  -Pain: acetaminophen, oxycodone, hydromorphone PRN  -Sedation: none  -Nausea: ondansetron PRN  -Repeat angio Monday 10/9 possible fast track pending angio  -TCD M-F  -Strict I/O  -PT/OT    CARDIOVASCULAR:  #No active issues  Assessment:  - Stable     Plan:  -Continue to monitor on telemetry  -TTE today  -Goal liberalized to SBP < 200    -->Nicardipine, Hydralazine and Labetalol PRN    RESPIRATORY:  #No active issues  Assessment:  -Stable  Plan:  -Continuous pulse oximetry   -O2 PRN to maintain SpO2 > 94%, wean as tolerated  -Incentive spirometry Q2H while  awake    RENAL/:  #No active issues  Assessment:  -Baseline BUN/Cr: 12/0.75  -BUN/Cr: stable     Plan:  -Monitor with daily RFP  - Strict I/O    FEN/GI:  Assessment:  -Last BM: PTA  Plan:  -Monitor and replace electrolytes per protocol  -Diet: Regular diet   -Bowel Regimen: Docusate-Senna, Miralax     ENDOCRINE:  # No active issues  Assessment:  -B, stable   Plan:  -Accuchecks & ISS Q6H    HEMATOLOGY:  #No active issues  Assessment:  -Baseline Hgb: 13.7  -Baseline Plts: 281  -Hgb: slight drop in hemoglobin  Lab Results   Component Value Date    HGB 11.3 (L) 10/05/2023      -Plts:   Lab Results   Component Value Date     10/05/2023   Plan:  -Continue to monitor with daily CBC and Coag panel    INFECTIOUS DISEASE:  #leukocytosis  Assessment:  -WBC downtrending but elevated likely due to dex  -Afebrile  Lab Results   Component Value Date    WBC 12.8 (H) 10/05/2023     Plan:  -Continue to monitor for s/sx of infection  -Pan culture for temperature > 38.4 C    MUSCULOSKELETAL:  -No acute issues    SKIN:  -No acute issues  -Turns and skin care per NSU protocol    ACCESS:  - PIV x 2  - L radial a-line    PROPHYLAXIS:  -DVT/VTE: SCDs, SQH  -GI: Protonix    RESTRAINTS:  Not indicated/Patient does not meet criteria for restraints    Boaz Lange MD    The patient is critically ill with aneurysmal subarachnoid hemorrhage  Neurologically stable  Cont nimodipine, euvolemia therapy, permissive hypertension  Encourage oral intake     I have seen and examined the patient.  I have reviewed the patient's laboratory, radiographic, and clinical data.  I have spent 30 minutes providing critical care for the patient.       CARMELA Rehman M.D.

## 2023-10-05 NOTE — CARE PLAN
Problem: Safety - Adult  Goal: Free from fall injury  Outcome: Progressing     Problem: General Stroke  Goal: Maintain BP within ordered limits throughout shift  Outcome: Progressing     Problem: General Stroke  Goal: Controlled blood glucose throughout shift  Outcome: Progressing     Problem: General Stroke  Goal: Out of bed three times today  Outcome: Progressing     The patient's goals for the shift include to have decreased headache pain.    The clinical goals for the shift include Pt will remain hemodynamically stable during shift

## 2023-10-06 ENCOUNTER — APPOINTMENT (OUTPATIENT)
Dept: VASCULAR MEDICINE | Facility: HOSPITAL | Age: 38
DRG: 022 | End: 2023-10-06
Payer: COMMERCIAL

## 2023-10-06 LAB
ACT BLD: 133 SEC (ref 89–169)
ACT BLD: 180 SEC (ref 89–169)
ACT BLD: 229 SEC (ref 89–169)
ALBUMIN SERPL BCP-MCNC: 4.3 G/DL (ref 3.4–5)
ANION GAP SERPL CALC-SCNC: 15 MMOL/L (ref 10–20)
BUN SERPL-MCNC: 16 MG/DL (ref 6–23)
CA-I BLD-SCNC: 1.23 MMOL/L (ref 1.1–1.33)
CALCIUM SERPL-MCNC: 9.8 MG/DL (ref 8.6–10.6)
CHLORIDE SERPL-SCNC: 106 MMOL/L (ref 98–107)
CO2 SERPL-SCNC: 26 MMOL/L (ref 21–32)
CREAT SERPL-MCNC: 0.68 MG/DL (ref 0.5–1.05)
ERYTHROCYTE [DISTWIDTH] IN BLOOD BY AUTOMATED COUNT: 12.4 % (ref 11.5–14.5)
GFR SERPL CREATININE-BSD FRML MDRD: >90 ML/MIN/1.73M*2
GLUCOSE BLD MANUAL STRIP-MCNC: 102 MG/DL (ref 74–99)
GLUCOSE BLD MANUAL STRIP-MCNC: 109 MG/DL (ref 74–99)
GLUCOSE SERPL-MCNC: 96 MG/DL (ref 74–99)
HCT VFR BLD AUTO: 35.5 % (ref 36–46)
HGB BLD-MCNC: 11.8 G/DL (ref 12–16)
MAGNESIUM SERPL-MCNC: 1.84 MG/DL (ref 1.6–2.4)
MCH RBC QN AUTO: 29.8 PG (ref 26–34)
MCHC RBC AUTO-ENTMCNC: 33.2 G/DL (ref 32–36)
MCV RBC AUTO: 90 FL (ref 80–100)
NRBC BLD-RTO: 0 /100 WBCS (ref 0–0)
PHOSPHATE SERPL-MCNC: 4.5 MG/DL (ref 2.5–4.9)
PLATELET # BLD AUTO: 272 X10*3/UL (ref 150–450)
PMV BLD AUTO: 10.4 FL (ref 7.5–11.5)
POTASSIUM SERPL-SCNC: 4 MMOL/L (ref 3.5–5.3)
RBC # BLD AUTO: 3.96 X10*6/UL (ref 4–5.2)
SODIUM SERPL-SCNC: 143 MMOL/L (ref 136–145)
WBC # BLD AUTO: 14.2 X10*3/UL (ref 4.4–11.3)

## 2023-10-06 PROCEDURE — 80069 RENAL FUNCTION PANEL: CPT

## 2023-10-06 PROCEDURE — 83735 ASSAY OF MAGNESIUM: CPT

## 2023-10-06 PROCEDURE — 82330 ASSAY OF CALCIUM: CPT

## 2023-10-06 PROCEDURE — 2500000004 HC RX 250 GENERAL PHARMACY W/ HCPCS (ALT 636 FOR OP/ED): Performed by: STUDENT IN AN ORGANIZED HEALTH CARE EDUCATION/TRAINING PROGRAM

## 2023-10-06 PROCEDURE — 2500000004 HC RX 250 GENERAL PHARMACY W/ HCPCS (ALT 636 FOR OP/ED)

## 2023-10-06 PROCEDURE — 2500000001 HC RX 250 WO HCPCS SELF ADMINISTERED DRUGS (ALT 637 FOR MEDICARE OP)

## 2023-10-06 PROCEDURE — 2500000002 HC RX 250 W HCPCS SELF ADMINISTERED DRUGS (ALT 637 FOR MEDICARE OP, ALT 636 FOR OP/ED): Performed by: STUDENT IN AN ORGANIZED HEALTH CARE EDUCATION/TRAINING PROGRAM

## 2023-10-06 PROCEDURE — 2020000001 HC ICU ROOM DAILY

## 2023-10-06 PROCEDURE — 93886 INTRACRANIAL COMPLETE STUDY: CPT | Performed by: PSYCHIATRY & NEUROLOGY

## 2023-10-06 PROCEDURE — 93886 INTRACRANIAL COMPLETE STUDY: CPT

## 2023-10-06 PROCEDURE — 96372 THER/PROPH/DIAG INJ SC/IM: CPT | Performed by: STUDENT IN AN ORGANIZED HEALTH CARE EDUCATION/TRAINING PROGRAM

## 2023-10-06 PROCEDURE — 99291 CRITICAL CARE FIRST HOUR: CPT | Performed by: NEUROLOGICAL SURGERY

## 2023-10-06 PROCEDURE — 85027 COMPLETE CBC AUTOMATED: CPT

## 2023-10-06 PROCEDURE — S4991 NICOTINE PATCH NONLEGEND: HCPCS | Performed by: STUDENT IN AN ORGANIZED HEALTH CARE EDUCATION/TRAINING PROGRAM

## 2023-10-06 PROCEDURE — 2500000001 HC RX 250 WO HCPCS SELF ADMINISTERED DRUGS (ALT 637 FOR MEDICARE OP): Performed by: STUDENT IN AN ORGANIZED HEALTH CARE EDUCATION/TRAINING PROGRAM

## 2023-10-06 PROCEDURE — 36415 COLL VENOUS BLD VENIPUNCTURE: CPT

## 2023-10-06 PROCEDURE — 82947 ASSAY GLUCOSE BLOOD QUANT: CPT

## 2023-10-06 PROCEDURE — 2500000005 HC RX 250 GENERAL PHARMACY W/O HCPCS: Performed by: REGISTERED NURSE

## 2023-10-06 RX ORDER — CYCLOBENZAPRINE HCL 10 MG
5 TABLET ORAL 3 TIMES DAILY PRN
Status: DISCONTINUED | OUTPATIENT
Start: 2023-10-06 | End: 2023-10-10

## 2023-10-06 RX ADMIN — ACETAMINOPHEN 650 MG: 325 TABLET, FILM COATED ORAL at 20:00

## 2023-10-06 RX ADMIN — LIDOCAINE 1 PATCH: 4 PATCH TOPICAL at 08:04

## 2023-10-06 RX ADMIN — ACETAMINOPHEN 650 MG: 325 TABLET, FILM COATED ORAL at 13:47

## 2023-10-06 RX ADMIN — OXYCODONE HYDROCHLORIDE 5 MG: 5 TABLET ORAL at 00:29

## 2023-10-06 RX ADMIN — NIMODIPINE 60 MG: 30 CAPSULE, LIQUID FILLED ORAL at 14:52

## 2023-10-06 RX ADMIN — ACETAMINOPHEN 650 MG: 325 TABLET, FILM COATED ORAL at 08:06

## 2023-10-06 RX ADMIN — HEPARIN SODIUM 5000 UNITS: 5000 INJECTION INTRAVENOUS; SUBCUTANEOUS at 05:30

## 2023-10-06 RX ADMIN — OXYCODONE HYDROCHLORIDE 5 MG: 5 TABLET ORAL at 13:48

## 2023-10-06 RX ADMIN — SENNOSIDES AND DOCUSATE SODIUM 2 TABLET: 50; 8.6 TABLET ORAL at 21:01

## 2023-10-06 RX ADMIN — Medication 1 PATCH: at 08:04

## 2023-10-06 RX ADMIN — OXYCODONE HYDROCHLORIDE 5 MG: 5 TABLET ORAL at 07:30

## 2023-10-06 RX ADMIN — OXYCODONE HYDROCHLORIDE 5 MG: 5 TABLET ORAL at 21:01

## 2023-10-06 RX ADMIN — NIMODIPINE 60 MG: 30 CAPSULE, LIQUID FILLED ORAL at 18:51

## 2023-10-06 RX ADMIN — HEPARIN SODIUM 5000 UNITS: 5000 INJECTION INTRAVENOUS; SUBCUTANEOUS at 21:01

## 2023-10-06 RX ADMIN — ACETAMINOPHEN 650 MG: 325 TABLET, FILM COATED ORAL at 01:29

## 2023-10-06 RX ADMIN — CYCLOBENZAPRINE 5 MG: 10 TABLET, FILM COATED ORAL at 07:30

## 2023-10-06 RX ADMIN — MAGNESIUM SULFATE HEPTAHYDRATE 2 G: 40 INJECTION, SOLUTION INTRAVENOUS at 03:45

## 2023-10-06 RX ADMIN — NIMODIPINE 60 MG: 30 CAPSULE, LIQUID FILLED ORAL at 22:19

## 2023-10-06 RX ADMIN — SODIUM CHLORIDE 500 ML: 9 INJECTION, SOLUTION INTRAVENOUS at 14:52

## 2023-10-06 RX ADMIN — NIMODIPINE 60 MG: 30 CAPSULE, LIQUID FILLED ORAL at 01:29

## 2023-10-06 RX ADMIN — SENNOSIDES AND DOCUSATE SODIUM 2 TABLET: 50; 8.6 TABLET ORAL at 08:05

## 2023-10-06 RX ADMIN — NIMODIPINE 60 MG: 30 CAPSULE, LIQUID FILLED ORAL at 10:12

## 2023-10-06 RX ADMIN — HEPARIN SODIUM 5000 UNITS: 5000 INJECTION INTRAVENOUS; SUBCUTANEOUS at 14:52

## 2023-10-06 RX ADMIN — CYCLOBENZAPRINE 5 MG: 10 TABLET, FILM COATED ORAL at 18:51

## 2023-10-06 RX ADMIN — NIMODIPINE 60 MG: 30 CAPSULE, LIQUID FILLED ORAL at 05:30

## 2023-10-06 ASSESSMENT — PAIN - FUNCTIONAL ASSESSMENT
PAIN_FUNCTIONAL_ASSESSMENT: 0-10

## 2023-10-06 ASSESSMENT — PAIN DESCRIPTION - DESCRIPTORS
DESCRIPTORS: ACHING
DESCRIPTORS: HEADACHE

## 2023-10-06 ASSESSMENT — PAIN SCALES - GENERAL
PAINLEVEL_OUTOF10: 7
PAINLEVEL_OUTOF10: 0 - NO PAIN
PAINLEVEL_OUTOF10: 0 - NO PAIN
PAINLEVEL_OUTOF10: 8
PAINLEVEL_OUTOF10: 0 - NO PAIN
PAINLEVEL_OUTOF10: 7
PAINLEVEL_OUTOF10: 0 - NO PAIN
PAINLEVEL_OUTOF10: 7

## 2023-10-06 NOTE — PROGRESS NOTES
Subjective   HPI: Reshma Landers is a 37 y.o. female h/o tobacco use p/w WHOL on 9/29, CTH basal sylvian Subarachnoid hemorrhage (CMS/HCC) (HH2, mF3), PBD7, CTA 6mm basilar tip aneurysm     Interval Events: No events overnight. No longer with dizziness. Feels good, but has some mild back pain from lying in the bed.      Objective    Vitals:    10/06/23 0200 10/06/23 0300 10/06/23 0400 10/06/23 0600   BP: (!) 97/46 134/80 123/85 111/67   Pulse: 55 61 55 66   Resp: 17 20 18 14   Temp:   36.7 °C (98.1 °F)    TempSrc:   Temporal    SpO2: 96% 95% 98% 97%   Weight:       Height:                Results for orders placed or performed during the hospital encounter of 10/01/23 (from the past 24 hour(s))   POCT GLUCOSE   Result Value Ref Range    POCT Glucose 182 (H) 74 - 99 mg/dL   POCT GLUCOSE   Result Value Ref Range    POCT Glucose 121 (H) 74 - 99 mg/dL   POCT GLUCOSE   Result Value Ref Range    POCT Glucose 100 (H) 74 - 99 mg/dL   Renal function panel   Result Value Ref Range    Glucose 96 74 - 99 mg/dL    Sodium 143 136 - 145 mmol/L    Potassium 4.0 3.5 - 5.3 mmol/L    Chloride 106 98 - 107 mmol/L    Bicarbonate 26 21 - 32 mmol/L    Anion Gap 15 10 - 20 mmol/L    Urea Nitrogen 16 6 - 23 mg/dL    Creatinine 0.68 0.50 - 1.05 mg/dL    eGFR >90 >60 mL/min/1.73m*2    Calcium 9.8 8.6 - 10.6 mg/dL    Phosphorus 4.5 2.5 - 4.9 mg/dL    Albumin 4.3 3.4 - 5.0 g/dL   CBC   Result Value Ref Range    WBC 14.2 (H) 4.4 - 11.3 x10*3/uL    nRBC 0.0 0.0 - 0.0 /100 WBCs    RBC 3.96 (L) 4.00 - 5.20 x10*6/uL    Hemoglobin 11.8 (L) 12.0 - 16.0 g/dL    Hematocrit 35.5 (L) 36.0 - 46.0 %    MCV 90 80 - 100 fL    MCH 29.8 26.0 - 34.0 pg    MCHC 33.2 32.0 - 36.0 g/dL    RDW 12.4 11.5 - 14.5 %    Platelets 272 150 - 450 x10*3/uL    MPV 10.4 7.5 - 11.5 fL   Magnesium   Result Value Ref Range    Magnesium 1.84 1.60 - 2.40 mg/dL   Calcium, ionized   Result Value Ref Range    POCT Calcium, Ionized 1.23 1.1 - 1.33 mmol/L        Physical Exam  NEURO:  -Alert,  oriented x3  -Follows commands, 5/5 strength, no drift  CV:  -RRR on telemetry, NSR  RESP:  -Regular, unlabored  -Oxygen: Room air   :  - No catheter in place  GI:  -Abdomen NT/ND, soft  -Groin site intact, no hematoma  SKIN:  -Intact    Medications    Scheduled medications  acetaminophen, 650 mg, oral, 4x daily  heparin (porcine), 5,000 Units, subcutaneous, q8h  insulin lispro, 0-10 Units, subcutaneous, TID with meals  lidocaine, 1 patch, transdermal, Daily  nicotine, 1 patch, transdermal, Daily  niMODipine, 60 mg, oral, q4h   Or  niMODipine, 60 mg, oral, q4h  perflutren protein A microsphere, 0.5 mL, intravenous, Once in imaging  polyethylene glycol, 17 g, oral, Daily  sennosides-docusate sodium, 2 tablet, oral, BID  sulfur hexafluoride microsphr, 2 mL, intravenous, Once in imaging      Continuous medications     PRN medications  PRN medications: cyclobenzaprine, dextrose 10 % in water (D10W), dextrose, glucagon, hydrALAZINE, labetaloL, magnesium sulfate, oxyCODONE, potassium chloride CR **OR** potassium chloride, potassium chloride CR **OR** potassium chloride, potassium chloride      Lab Results  Lab Results   Component Value Date    WBC 14.2 (H) 10/06/2023    HGB 11.8 (L) 10/06/2023    HCT 35.5 (L) 10/06/2023    MCV 90 10/06/2023     10/06/2023     Lab Results   Component Value Date    GLUCOSE 96 10/06/2023    CALCIUM 9.8 10/06/2023     10/06/2023    K 4.0 10/06/2023    CO2 26 10/06/2023     10/06/2023    BUN 16 10/06/2023    CREATININE 0.68 10/06/2023     Imaging Results    Vascular US Transcranial Doppler (TCD) Complete   Final Result      Vascular US Transcranial Doppler (TCD) Complete   Final Result      Vascular US Transcranial Doppler (TCD) Complete   Final Result      Transthoracic Echo (TTE) Complete   Final Result      IR angiogram   Preliminary Result   1. Superiorly projecting, irregular, partially thrombosed basilar tip   aneurysm with filling portion measuring approximately 6.2  x 3.7 x 3.2   mm with a 2.7 mm neck.   2. Status post balloon assisted coil embolization of basilar tip   aneurysm with <1mm intended neck remnant.   3. X per CT head with stable blood products and expected coil   artifact, no new acute intracranial pathology.        I was present for and/or performed the critical portions of the   procedure and immediately available throughout the entire procedure.   I personally reviewed the image(s)/study and interpretation. I agree   with the findings as stated. Performed and dictated at [facility].        MACRO:   None             Dictation workstation:   [id number]      Vascular US Transcranial Doppler (TCD) Complete   Final Result      XR chest 1 view   Final Result   No radiographic evidence of acute cardiopulmonary process.        I personally reviewed the images/study and I agree with the findings   as stated by radiology resident Annalisa Knowles MD. This study was   interpreted at [facility],   [place], [place].        Signed by: Elliot Bradley 10/2/2023 8:55 AM   Dictation workstation:   [id number]      CT angio head neck w and wo IV contrast   Final Result   1. A 5-6 mm anterosuperior oriented basilar tip aneurysm is present,   with slightly irregular appearance of the aneurysm near the dome.   2. Slight aneurysmal appearance of the anterior cerebral artery on   sagittal imaging, although it is unremarkable in appearance on   coronal and axial slices. No additional intracranial aneurysms are   identified.   3. No evidence of large vessel occlusion in the proximal intracranial   circulation.   4. No significant stenosis is present in the large arteries of the   neck.   5. Noncontrast axial CT images of the head demonstrate small amount   of hyperdense hemorrhage present near the left carotid terminal, in   the right sylvian fissure, and a small focus in the sulci of the left   frontal lobe.         MACRO:   None        Signed by: Odalis Bailey 10/1/2023 4:03 PM   Dictation workstation:   [id number]      IR intervention NEURO stent    (Results Pending)   Vascular US Transcranial Doppler (TCD) Complete    (Results Pending)         Assessment/Plan   Reshma Landers is a 37 y.o. woman with h/o tobacco use who p/w WHOL on , CTH basal sylvian subarachnoid hemorrhage (CMS/HCC) (HH2, mF3), CTA 6mm basilar tip aneurysm, 10/2 s/p coil embolization of basilar tip aneurysm.       NEURO:  #HH2 mF3 SAH PBD 7, s/p coil embo of basilar tip aneurysm   Assessment:  -Neurologically: stable  Plan:  -NSU  -Q1H neuro checks  -Continue Nimodipine 60q4 x 21 days  -Discontinue Dex 2q8  -Pain: acetaminophen, oxycodone, hydromorphone PRN  -Sedation: none  -Nausea: ondansetron PRN  -Repeat angio Monday 10/9 possible fast track pending angio  -TCD M-F  -Strict I/O  -PT/OT    CARDIOVASCULAR:  #No active issues  Assessment:  - Stable     Plan:  -Continue to monitor on telemetry  -TTE 10/3 with EF 65%, negative bubble  -Goal liberalized to SBP < 200    -->Nicardipine, Hydralazine and Labetalol PRN    RESPIRATORY:  #No active issues  Assessment:  -Stable  Plan:  -Continuous pulse oximetry   -O2 PRN to maintain SpO2 > 94%, wean as tolerated  -Incentive spirometry Q2H while awake    RENAL/:  #No active issues  Assessment:  -Baseline BUN/Cr: 12/0.75  -BUN/Cr: stable     Plan:  -Monitor with daily RFP  - Strict I/O    FEN/GI:  Assessment:  -Last BM: PTA  Plan:  -Monitor and replace electrolytes per protocol  -Diet: Regular diet   -Bowel Regimen: Docusate-Senna, Miralax     ENDOCRINE:  # No active issues  Assessment:  -B, stable   Plan:  -Accuchecks & ISS Q6H    HEMATOLOGY:  #No active issues  Assessment:  -Baseline Hgb: 13.7  -Baseline Plts: 281  -Hgb: slight drop in hemoglobin  Lab Results   Component Value Date    HGB 11.8 (L) 10/06/2023      -Plts:   Lab Results   Component Value Date     10/06/2023   Plan:  -Continue  to monitor with daily CBC and Coag panel    INFECTIOUS DISEASE:  #leukocytosis  Assessment:  -WBC downtrending but elevated likely due to dex  -Afebrile  Lab Results   Component Value Date    WBC 14.2 (H) 10/06/2023     Plan:  -Continue to monitor for s/sx of infection  -Pan culture for temperature > 38.4 C    MUSCULOSKELETAL:  -No acute issues    SKIN:  -No acute issues  -Turns and skin care per NSU protocol    ACCESS:  - PIV x 2  - L radial a-line    PROPHYLAXIS:  -DVT/VTE: SCDs, SQH  -GI: Protonix    RESTRAINTS:  Not indicated/Patient does not meet criteria for restraints    Tank Carl,     The patient is critically ill with aneurysmal subarachnoid hemorrhage  Neurologically stable  Cont nimodipine, euvolemia therapy, permissive hypertension  Encourage oral intake     I have seen and examined the patient.  I have reviewed the patient's laboratory, radiographic, and clinical data.  I have spent 30 minutes providing critical care for the patient.       CARMELA Rehman M.D.

## 2023-10-06 NOTE — CARE PLAN
Problem: Safety - Adult  Goal: Free from fall injury  Outcome: Progressing     Problem: General Stroke  Goal: Demonstrate improvement in neurological exam throughout the shift  Outcome: Progressing  Goal: Maintain BP within ordered limits throughout shift  Outcome: Progressing  Goal: No symptoms of aspiration throughout shift  Outcome: Progressing  Goal: No symptoms of hemorrhage throughout shift  Outcome: Progressing  Goal: Tolerate enteral feeding throughout shift  Outcome: Progressing  Goal: Decreased nausea/vomiting throughout shift  Outcome: Progressing  Goal: Controlled blood glucose throughout shift  Outcome: Progressing  Goal: Out of bed three times today  Outcome: Progressing     Problem: ICU Stroke  Goal: Maintain ICP within ordered limits throughout shift  Outcome: Progressing  Goal: Tolerate EVD clamping trial throughout shift  Outcome: Progressing  Goal: Tolerate ventilator weaning trial during shift  Outcome: Progressing  Goal: Maintain patent airway throughout shift  Outcome: Progressing  Goal: Achieve/maintain targeted sodium level throughout shift  Outcome: Progressing   The patient's goals for the shift include to have decreased headache pain.    The clinical goals for the shift include Pt will remain hemodynamically stable during shift

## 2023-10-06 NOTE — PROGRESS NOTES
"Reshma Landers is a 37 y.o. female on day 5 of admission presenting with Subarachnoid hemorrhage (CMS/HCC).    Subjective   NAEON       Objective     Physical Exam  A&Ox3  Face symmetric  Tongue midline  Fcx4 5/5  SILT    Last Recorded Vitals  Blood pressure 123/85, pulse 55, temperature 36.7 °C (98.1 °F), temperature source Temporal, resp. rate 18, height 1.651 m (5' 5\"), weight 59 kg (130 lb 1.1 oz), SpO2 98 %.  Intake/Output last 3 Shifts:  I/O last 3 completed shifts:  In: 4290 (73.3 mL/kg) [P.O.:4240; I.V.:50 (0.9 mL/kg)]  Out: 2925 (50 mL/kg) [Urine:2925 (1.4 mL/kg/hr)]  Weight: 58.5 kg     Relevant Results                Assessment/Plan   Principal Problem:    Subarachnoid hemorrhage (CMS/HCC)    Assessment:  Reshma is a 37 y.o. female with a principal problem of Subarachnoid hemorrhage (CMS/HCC).     Additional active problems include:  Principal Problem:    Subarachnoid hemorrhage (CMS/HCC)     37 year old female with h/o smoking, uncle  2/2 aneurysmal hemorrhage p/w 2 days headache and nuchal rigidity, CTH basal/Sylvian SAH, CTA 6mm basilar tip aneurysm.     10/2 s/p coil embo of basilar tip aneurysm (small remnant)  10/3 TTE EF 65%     Plan:  NSU  Q1 neurochecks  Plan for repeat angiogram on Mon 10/9  SBP <200  I/O euvolemic  TCDs  PTOT- NN           Estela Pisano MD      "

## 2023-10-07 LAB
ALBUMIN SERPL BCP-MCNC: 3.9 G/DL (ref 3.4–5)
ALP SERPL-CCNC: 37 U/L (ref 33–110)
ALT SERPL W P-5'-P-CCNC: 12 U/L (ref 7–45)
ANION GAP SERPL CALC-SCNC: 13 MMOL/L (ref 10–20)
AST SERPL W P-5'-P-CCNC: 11 U/L (ref 9–39)
BILIRUB SERPL-MCNC: 0.4 MG/DL (ref 0–1.2)
BUN SERPL-MCNC: 20 MG/DL (ref 6–23)
CALCIUM SERPL-MCNC: 9.4 MG/DL (ref 8.6–10.6)
CHLORIDE SERPL-SCNC: 106 MMOL/L (ref 98–107)
CO2 SERPL-SCNC: 26 MMOL/L (ref 21–32)
CREAT SERPL-MCNC: 0.51 MG/DL (ref 0.5–1.05)
ERYTHROCYTE [DISTWIDTH] IN BLOOD BY AUTOMATED COUNT: 12.6 % (ref 11.5–14.5)
GFR SERPL CREATININE-BSD FRML MDRD: >90 ML/MIN/1.73M*2
GLUCOSE BLD MANUAL STRIP-MCNC: 141 MG/DL (ref 74–99)
GLUCOSE SERPL-MCNC: 89 MG/DL (ref 74–99)
HCT VFR BLD AUTO: 32.8 % (ref 36–46)
HGB BLD-MCNC: 11 G/DL (ref 12–16)
MCH RBC QN AUTO: 30.3 PG (ref 26–34)
MCHC RBC AUTO-ENTMCNC: 33.5 G/DL (ref 32–36)
MCV RBC AUTO: 90 FL (ref 80–100)
NRBC BLD-RTO: 0 /100 WBCS (ref 0–0)
PLATELET # BLD AUTO: 257 X10*3/UL (ref 150–450)
PMV BLD AUTO: 10.1 FL (ref 7.5–11.5)
POTASSIUM SERPL-SCNC: 4.2 MMOL/L (ref 3.5–5.3)
PROT SERPL-MCNC: 6.3 G/DL (ref 6.4–8.2)
RBC # BLD AUTO: 3.63 X10*6/UL (ref 4–5.2)
SODIUM SERPL-SCNC: 141 MMOL/L (ref 136–145)
WBC # BLD AUTO: 12 X10*3/UL (ref 4.4–11.3)

## 2023-10-07 PROCEDURE — 80053 COMPREHEN METABOLIC PANEL: CPT | Performed by: STUDENT IN AN ORGANIZED HEALTH CARE EDUCATION/TRAINING PROGRAM

## 2023-10-07 PROCEDURE — S4991 NICOTINE PATCH NONLEGEND: HCPCS | Performed by: STUDENT IN AN ORGANIZED HEALTH CARE EDUCATION/TRAINING PROGRAM

## 2023-10-07 PROCEDURE — 2500000001 HC RX 250 WO HCPCS SELF ADMINISTERED DRUGS (ALT 637 FOR MEDICARE OP): Performed by: STUDENT IN AN ORGANIZED HEALTH CARE EDUCATION/TRAINING PROGRAM

## 2023-10-07 PROCEDURE — 85027 COMPLETE CBC AUTOMATED: CPT | Performed by: STUDENT IN AN ORGANIZED HEALTH CARE EDUCATION/TRAINING PROGRAM

## 2023-10-07 PROCEDURE — 2020000001 HC ICU ROOM DAILY

## 2023-10-07 PROCEDURE — 2500000001 HC RX 250 WO HCPCS SELF ADMINISTERED DRUGS (ALT 637 FOR MEDICARE OP)

## 2023-10-07 PROCEDURE — 2500000004 HC RX 250 GENERAL PHARMACY W/ HCPCS (ALT 636 FOR OP/ED)

## 2023-10-07 PROCEDURE — 2500000005 HC RX 250 GENERAL PHARMACY W/O HCPCS: Performed by: REGISTERED NURSE

## 2023-10-07 PROCEDURE — 82947 ASSAY GLUCOSE BLOOD QUANT: CPT

## 2023-10-07 PROCEDURE — 96372 THER/PROPH/DIAG INJ SC/IM: CPT | Performed by: STUDENT IN AN ORGANIZED HEALTH CARE EDUCATION/TRAINING PROGRAM

## 2023-10-07 PROCEDURE — 2500000002 HC RX 250 W HCPCS SELF ADMINISTERED DRUGS (ALT 637 FOR MEDICARE OP, ALT 636 FOR OP/ED): Performed by: STUDENT IN AN ORGANIZED HEALTH CARE EDUCATION/TRAINING PROGRAM

## 2023-10-07 PROCEDURE — 36415 COLL VENOUS BLD VENIPUNCTURE: CPT | Performed by: STUDENT IN AN ORGANIZED HEALTH CARE EDUCATION/TRAINING PROGRAM

## 2023-10-07 PROCEDURE — 2500000004 HC RX 250 GENERAL PHARMACY W/ HCPCS (ALT 636 FOR OP/ED): Performed by: STUDENT IN AN ORGANIZED HEALTH CARE EDUCATION/TRAINING PROGRAM

## 2023-10-07 PROCEDURE — 99291 CRITICAL CARE FIRST HOUR: CPT | Performed by: NEUROLOGICAL SURGERY

## 2023-10-07 RX ORDER — DEXAMETHASONE 2 MG/1
2 TABLET ORAL ONCE
Status: COMPLETED | OUTPATIENT
Start: 2023-10-07 | End: 2023-10-07

## 2023-10-07 RX ORDER — OXYCODONE HYDROCHLORIDE 5 MG/1
5 TABLET ORAL ONCE
Status: COMPLETED | OUTPATIENT
Start: 2023-10-07 | End: 2023-10-07

## 2023-10-07 RX ADMIN — HEPARIN SODIUM 5000 UNITS: 5000 INJECTION INTRAVENOUS; SUBCUTANEOUS at 05:15

## 2023-10-07 RX ADMIN — DEXAMETHASONE 2 MG: 2 TABLET ORAL at 10:54

## 2023-10-07 RX ADMIN — HEPARIN SODIUM 5000 UNITS: 5000 INJECTION INTRAVENOUS; SUBCUTANEOUS at 21:26

## 2023-10-07 RX ADMIN — NIMODIPINE 60 MG: 30 CAPSULE, LIQUID FILLED ORAL at 05:15

## 2023-10-07 RX ADMIN — CYCLOBENZAPRINE 5 MG: 10 TABLET, FILM COATED ORAL at 00:20

## 2023-10-07 RX ADMIN — CYCLOBENZAPRINE 5 MG: 10 TABLET, FILM COATED ORAL at 20:38

## 2023-10-07 RX ADMIN — OXYCODONE HYDROCHLORIDE 5 MG: 5 TABLET ORAL at 05:14

## 2023-10-07 RX ADMIN — ACETAMINOPHEN 650 MG: 325 TABLET, FILM COATED ORAL at 20:37

## 2023-10-07 RX ADMIN — HEPARIN SODIUM 5000 UNITS: 5000 INJECTION INTRAVENOUS; SUBCUTANEOUS at 13:12

## 2023-10-07 RX ADMIN — ACETAMINOPHEN 650 MG: 325 TABLET, FILM COATED ORAL at 01:41

## 2023-10-07 RX ADMIN — NIMODIPINE 60 MG: 30 CAPSULE, LIQUID FILLED ORAL at 09:28

## 2023-10-07 RX ADMIN — NIMODIPINE 60 MG: 30 CAPSULE, LIQUID FILLED ORAL at 13:12

## 2023-10-07 RX ADMIN — ACETAMINOPHEN 650 MG: 325 TABLET, FILM COATED ORAL at 08:13

## 2023-10-07 RX ADMIN — Medication 1 PATCH: at 08:14

## 2023-10-07 RX ADMIN — NIMODIPINE 60 MG: 30 CAPSULE, LIQUID FILLED ORAL at 21:26

## 2023-10-07 RX ADMIN — OXYCODONE HYDROCHLORIDE 5 MG: 5 TABLET ORAL at 03:04

## 2023-10-07 RX ADMIN — OXYCODONE HYDROCHLORIDE 5 MG: 5 TABLET ORAL at 16:18

## 2023-10-07 RX ADMIN — OXYCODONE HYDROCHLORIDE 5 MG: 5 TABLET ORAL at 09:28

## 2023-10-07 RX ADMIN — SENNOSIDES AND DOCUSATE SODIUM 2 TABLET: 50; 8.6 TABLET ORAL at 20:37

## 2023-10-07 RX ADMIN — ACETAMINOPHEN 650 MG: 325 TABLET, FILM COATED ORAL at 13:12

## 2023-10-07 RX ADMIN — NIMODIPINE 60 MG: 30 CAPSULE, LIQUID FILLED ORAL at 18:41

## 2023-10-07 RX ADMIN — NIMODIPINE 60 MG: 30 CAPSULE, LIQUID FILLED ORAL at 01:41

## 2023-10-07 RX ADMIN — LIDOCAINE 1 PATCH: 4 PATCH TOPICAL at 08:14

## 2023-10-07 ASSESSMENT — COGNITIVE AND FUNCTIONAL STATUS - GENERAL
TOILETING: A LITTLE
DAILY ACTIVITIY SCORE: 21
HELP NEEDED FOR BATHING: A LITTLE
MOBILITY SCORE: 24
DRESSING REGULAR UPPER BODY CLOTHING: A LITTLE

## 2023-10-07 ASSESSMENT — PAIN SCALES - GENERAL
PAINLEVEL_OUTOF10: 6
PAINLEVEL_OUTOF10: 0 - NO PAIN
PAINLEVEL_OUTOF10: 4
PAINLEVEL_OUTOF10: 0 - NO PAIN
PAINLEVEL_OUTOF10: 0 - NO PAIN
PAINLEVEL_OUTOF10: 6
PAINLEVEL_OUTOF10: 6
PAINLEVEL_OUTOF10: 7
PAINLEVEL_OUTOF10: 6
PAINLEVEL_OUTOF10: 2
PAINLEVEL_OUTOF10: 0 - NO PAIN
PAINLEVEL_OUTOF10: 7
PAINLEVEL_OUTOF10: 0 - NO PAIN

## 2023-10-07 ASSESSMENT — PAIN DESCRIPTION - DESCRIPTORS
DESCRIPTORS: HEADACHE
DESCRIPTORS: ACHING;THROBBING
DESCRIPTORS: ACHING
DESCRIPTORS: ACHING;THROBBING

## 2023-10-07 NOTE — PROGRESS NOTES
"Reshma Landers is a 37 y.o. female on day 6 of admission presenting with Subarachnoid hemorrhage (CMS/HCC).    Subjective     No acute events overnight.       Objective     Physical Exam  A&Ox3  Face symmetric  Tongue midline  Fcx4 5/5  SILT    Last Recorded Vitals  Blood pressure 119/85, pulse 70, temperature 36.7 °C (98.1 °F), temperature source Temporal, resp. rate 14, height 1.651 m (5' 5\"), weight 59.9 kg (132 lb 0.9 oz), SpO2 100 %.  Intake/Output last 3 Shifts:  I/O last 3 completed shifts:  In: 2620 (43.7 mL/kg) [P.O.:2120; IV Piggyback:500]  Out: 2700 (45.1 mL/kg) [Urine:2700 (1.3 mL/kg/hr)]  Weight: 59.9 kg     Relevant Results                Assessment/Plan   Principal Problem:    Subarachnoid hemorrhage (CMS/HCC)    Assessment:  Rehsma is a 37 y.o. female with a principal problem of Subarachnoid hemorrhage (CMS/HCC).     Additional active problems include:  Principal Problem:    Subarachnoid hemorrhage (CMS/HCC)     37 year old female with h/o smoking, uncle  2/2 aneurysmal hemorrhage p/w 2 days headache and nuchal rigidity, CTH basal/Sylvian SAH, CTA 6mm basilar tip aneurysm.     10/2 s/p coil embo of basilar tip aneurysm (small remnant)  10/3 TTE EF 65%     Plan:  NSU  Q1 neurochecks  Plan for repeat angiogram on Mon 10/9  SBP <200  I/O euvolemic  TCDs  PTOT- NN           Delbert Brannon MD      "

## 2023-10-07 NOTE — PROGRESS NOTES
Subjective   HPI: Reshma Landers is a 37 y.o. female h/o tobacco use p/w WHOL on 9/29, CTH basal sylvian Subarachnoid hemorrhage (CMS/HCC) (HH2, mF3), PBD7, CTA 6mm basilar tip aneurysm     Interval Events: No events overnight. She continues to have back pain.     Objective    Vitals:    10/07/23 0300 10/07/23 0400 10/07/23 0500 10/07/23 0600   BP: 117/80 127/89 109/64 119/85   Pulse: 62 64 60 70   Resp: 18 18 16 14   Temp:  36.2 °C (97.2 °F)     TempSrc:  Temporal     SpO2: 100% 97% 100% 100%   Weight:       Height:                Results for orders placed or performed during the hospital encounter of 10/01/23 (from the past 24 hour(s))   POCT GLUCOSE   Result Value Ref Range    POCT Glucose 102 (H) 74 - 99 mg/dL   POCT GLUCOSE   Result Value Ref Range    POCT Glucose 109 (H) 74 - 99 mg/dL   CBC   Result Value Ref Range    WBC 12.0 (H) 4.4 - 11.3 x10*3/uL    nRBC 0.0 0.0 - 0.0 /100 WBCs    RBC 3.63 (L) 4.00 - 5.20 x10*6/uL    Hemoglobin 11.0 (L) 12.0 - 16.0 g/dL    Hematocrit 32.8 (L) 36.0 - 46.0 %    MCV 90 80 - 100 fL    MCH 30.3 26.0 - 34.0 pg    MCHC 33.5 32.0 - 36.0 g/dL    RDW 12.6 11.5 - 14.5 %    Platelets 257 150 - 450 x10*3/uL    MPV 10.1 7.5 - 11.5 fL   Comprehensive metabolic panel   Result Value Ref Range    Glucose 89 74 - 99 mg/dL    Sodium 141 136 - 145 mmol/L    Potassium 4.2 3.5 - 5.3 mmol/L    Chloride 106 98 - 107 mmol/L    Bicarbonate 26 21 - 32 mmol/L    Anion Gap 13 10 - 20 mmol/L    Urea Nitrogen 20 6 - 23 mg/dL    Creatinine 0.51 0.50 - 1.05 mg/dL    eGFR >90 >60 mL/min/1.73m*2    Calcium 9.4 8.6 - 10.6 mg/dL    Albumin 3.9 3.4 - 5.0 g/dL    Alkaline Phosphatase 37 33 - 110 U/L    Total Protein 6.3 (L) 6.4 - 8.2 g/dL    AST 11 9 - 39 U/L    Bilirubin, Total 0.4 0.0 - 1.2 mg/dL    ALT 12 7 - 45 U/L        Physical Exam  NEURO:  -Alert, oriented x3  -Follows commands, 5/5 strength, no drift  CV:  -RRR on telemetry, NSR  RESP:  -Regular, unlabored  -Oxygen: Room air   :  - No catheter in  place  GI:  -Abdomen NT/ND, soft  -Groin site intact, no hematoma  SKIN:  -Intact    Medications    Scheduled medications  acetaminophen, 650 mg, oral, 4x daily  heparin (porcine), 5,000 Units, subcutaneous, q8h  lidocaine, 1 patch, transdermal, Daily  nicotine, 1 patch, transdermal, Daily  niMODipine, 60 mg, oral, q4h   Or  niMODipine, 60 mg, oral, q4h  perflutren protein A microsphere, 0.5 mL, intravenous, Once in imaging  polyethylene glycol, 17 g, oral, Daily  sennosides-docusate sodium, 2 tablet, oral, BID  sulfur hexafluoride microsphr, 2 mL, intravenous, Once in imaging      Continuous medications     PRN medications  PRN medications: cyclobenzaprine, dextrose 10 % in water (D10W), dextrose, glucagon, hydrALAZINE, labetaloL, magnesium sulfate, oxyCODONE, potassium chloride CR **OR** potassium chloride, potassium chloride CR **OR** potassium chloride, potassium chloride      Lab Results  Lab Results   Component Value Date    WBC 12.0 (H) 10/07/2023    HGB 11.0 (L) 10/07/2023    HCT 32.8 (L) 10/07/2023    MCV 90 10/07/2023     10/07/2023     Lab Results   Component Value Date    GLUCOSE 89 10/07/2023    CALCIUM 9.4 10/07/2023     10/07/2023    K 4.2 10/07/2023    CO2 26 10/07/2023     10/07/2023    BUN 20 10/07/2023    CREATININE 0.51 10/07/2023     Imaging Results    Vascular US Transcranial Doppler (TCD) Complete   Final Result      Vascular US Transcranial Doppler (TCD) Complete   Final Result      Vascular US Transcranial Doppler (TCD) Complete   Final Result      Vascular US Transcranial Doppler (TCD) Complete   Final Result      Transthoracic Echo (TTE) Complete   Final Result      IR angiogram   Preliminary Result   1. Superiorly projecting, irregular, partially thrombosed basilar tip   aneurysm with filling portion measuring approximately 6.2 x 3.7 x 3.2   mm with a 2.7 mm neck.   2. Status post balloon assisted coil embolization of basilar tip   aneurysm with <1mm intended neck  remnant.   3. X per CT head with stable blood products and expected coil   artifact, no new acute intracranial pathology.        I was present for and/or performed the critical portions of the   procedure and immediately available throughout the entire procedure.   I personally reviewed the image(s)/study and interpretation. I agree   with the findings as stated. Performed and dictated at [facility].        MACRO:   None             Dictation workstation:   [id number]      Vascular US Transcranial Doppler (TCD) Complete   Final Result      XR chest 1 view   Final Result   No radiographic evidence of acute cardiopulmonary process.        I personally reviewed the images/study and I agree with the findings   as stated by radiology resident Annalisa Knowles MD. This study was   interpreted at [place], [place].        Signed by: Elliot Bradley 10/2/2023 8:55 AM   Dictation workstation:   [id number]      CT angio head neck w and wo IV contrast   Final Result   1. A 5-6 mm anterosuperior oriented basilar tip aneurysm is present,   with slightly irregular appearance of the aneurysm near the dome.   2. Slight aneurysmal appearance of the anterior cerebral artery on   sagittal imaging, although it is unremarkable in appearance on   coronal and axial slices. No additional intracranial aneurysms are   identified.   3. No evidence of large vessel occlusion in the proximal intracranial   circulation.   4. No significant stenosis is present in the large arteries of the   neck.   5. Noncontrast axial CT images of the head demonstrate small amount   of hyperdense hemorrhage present near the left carotid terminal, in   the right sylvian fissure, and a small focus in the sulci of the left   frontal lobe.        MACRO:   None        Signed by: Odalis Bailey 10/1/2023 4:03 PM   Dictation workstation:   [id number]      IR intervention NEURO  stent    (Results Pending)         Assessment/Plan   Reshma Landers is a 37 y.o. woman with h/o tobacco use who p/w WHOL on 9/29, CTH basal sylvian subarachnoid hemorrhage (CMS/HCC) (HH2, mF3), CTA 6mm basilar tip aneurysm, 10/2 s/p coil embolization of basilar tip aneurysm.       NEURO:  #HH2 mF3 SAH PBD 7, s/p coil embo of basilar tip aneurysm   Assessment:  -Neurologically: stable  Plan:  -NSU  -Q1H neuro checks  -Continue Nimodipine 60q4 x 21 days  -Pain: acetaminophen, oxycodone, hydromorphone PRN  -Nausea: ondansetron PRN  -Repeat angio Monday 10/9 possible fast track pending angio  -Continue nicotine patch and lidocaine patch for back pain  -TCD M-F  -Strict I/O  -PT/OT    CARDIOVASCULAR:  #No active issues  Assessment:  - SBP below goal     Plan:  -Continue to monitor on telemetry  -TTE 10/3 with EF 65%, negative bubble  -Goal liberalized to SBP < 200    -->Nicardipine, Hydralazine and Labetalol PRN    RESPIRATORY:  #No active issues  Assessment:  -Stable  Plan:  -Continuous pulse oximetry   -O2 PRN to maintain SpO2 > 94%, wean as tolerated    RENAL/:  #No active issues  Assessment:  -Baseline BUN/Cr: 12/0.75  -BUN/Cr: stable     Plan:  -Monitor with daily RFP  - Strict I/O    FEN/GI:  Assessment:  -Last BM: 10/6  Plan:  -Monitor and replace electrolytes per protocol  -Diet: Regular diet   -Bowel Regimen: Docusate-Senna, Miralax     ENDOCRINE:  # No active issues  Assessment:  -BG controlled  Plan:  -Will add ISS if BG > 180    HEMATOLOGY:  #No active issues  Assessment:  -Baseline Hgb: 13.7  -Baseline Plts: 281  -Hgb: stable  Lab Results   Component Value Date    HGB 11.0 (L) 10/07/2023      -Plts:   Lab Results   Component Value Date     10/07/2023   Plan:  -Continue to monitor with daily CBC    INFECTIOUS DISEASE:  #Leukocytosis  Assessment:  -WBC downtrending  -Afebrile  Lab Results   Component Value Date    WBC 12.0 (H) 10/07/2023     Plan:  -Continue to monitor for s/sx of infection  -Pan culture  for temperature > 38.4 C    MUSCULOSKELETAL:  -No acute issues    SKIN:  -No acute issues  -Turns and skin care per NSU protocol    ACCESS:  - PIV x 2  - L radial a-line    PROPHYLAXIS:  -DVT/VTE: SCDs, SQH  -GI: not needed     RESTRAINTS:  Not indicated/Patient does not meet criteria for restraints    Boaz Lange MD    The patient is critically ill with aneurysmal subarachnoid hemorrhage  Neurologically stable  Cont nimodipine, euvolemia therapy, permissive hypertension  Encourage oral intake     I have seen and examined the patient.  I have reviewed the patient's laboratory, radiographic, and clinical data.  I have spent 30 minutes providing critical care for the patient.       CARMELA Rehman M.D.

## 2023-10-08 LAB
ALBUMIN SERPL BCP-MCNC: 4.3 G/DL (ref 3.4–5)
ALP SERPL-CCNC: 38 U/L (ref 33–110)
ALT SERPL W P-5'-P-CCNC: 18 U/L (ref 7–45)
ANION GAP SERPL CALC-SCNC: 13 MMOL/L (ref 10–20)
APTT PPP: 30 SECONDS (ref 27–38)
AST SERPL W P-5'-P-CCNC: 15 U/L (ref 9–39)
BILIRUB SERPL-MCNC: 0.4 MG/DL (ref 0–1.2)
BUN SERPL-MCNC: 16 MG/DL (ref 6–23)
CA-I BLD-SCNC: 1.25 MMOL/L (ref 1.1–1.33)
CALCIUM SERPL-MCNC: 9.6 MG/DL (ref 8.6–10.6)
CHLORIDE SERPL-SCNC: 105 MMOL/L (ref 98–107)
CO2 SERPL-SCNC: 28 MMOL/L (ref 21–32)
CREAT SERPL-MCNC: 0.62 MG/DL (ref 0.5–1.05)
ERYTHROCYTE [DISTWIDTH] IN BLOOD BY AUTOMATED COUNT: 12.5 % (ref 11.5–14.5)
GFR SERPL CREATININE-BSD FRML MDRD: >90 ML/MIN/1.73M*2
GLUCOSE SERPL-MCNC: 85 MG/DL (ref 74–99)
HCT VFR BLD AUTO: 38.3 % (ref 36–46)
HGB BLD-MCNC: 12.5 G/DL (ref 12–16)
INR PPP: 1 (ref 0.9–1.1)
MAGNESIUM SERPL-MCNC: 1.97 MG/DL (ref 1.6–2.4)
MCH RBC QN AUTO: 30.3 PG (ref 26–34)
MCHC RBC AUTO-ENTMCNC: 32.6 G/DL (ref 32–36)
MCV RBC AUTO: 93 FL (ref 80–100)
NRBC BLD-RTO: 0 /100 WBCS (ref 0–0)
PHOSPHATE SERPL-MCNC: 3.9 MG/DL (ref 2.5–4.9)
PLATELET # BLD AUTO: 304 X10*3/UL (ref 150–450)
PMV BLD AUTO: 9.9 FL (ref 7.5–11.5)
POTASSIUM SERPL-SCNC: 4.3 MMOL/L (ref 3.5–5.3)
PROT SERPL-MCNC: 6.7 G/DL (ref 6.4–8.2)
PROTHROMBIN TIME: 11.4 SECONDS (ref 9.8–12.8)
RBC # BLD AUTO: 4.12 X10*6/UL (ref 4–5.2)
SODIUM SERPL-SCNC: 142 MMOL/L (ref 136–145)
WBC # BLD AUTO: 13.3 X10*3/UL (ref 4.4–11.3)

## 2023-10-08 PROCEDURE — 2500000001 HC RX 250 WO HCPCS SELF ADMINISTERED DRUGS (ALT 637 FOR MEDICARE OP)

## 2023-10-08 PROCEDURE — 80053 COMPREHEN METABOLIC PANEL: CPT | Performed by: STUDENT IN AN ORGANIZED HEALTH CARE EDUCATION/TRAINING PROGRAM

## 2023-10-08 PROCEDURE — 2500000005 HC RX 250 GENERAL PHARMACY W/O HCPCS: Performed by: REGISTERED NURSE

## 2023-10-08 PROCEDURE — 85027 COMPLETE CBC AUTOMATED: CPT | Performed by: STUDENT IN AN ORGANIZED HEALTH CARE EDUCATION/TRAINING PROGRAM

## 2023-10-08 PROCEDURE — 83735 ASSAY OF MAGNESIUM: CPT

## 2023-10-08 PROCEDURE — 84100 ASSAY OF PHOSPHORUS: CPT

## 2023-10-08 PROCEDURE — 2500000002 HC RX 250 W HCPCS SELF ADMINISTERED DRUGS (ALT 637 FOR MEDICARE OP, ALT 636 FOR OP/ED): Performed by: STUDENT IN AN ORGANIZED HEALTH CARE EDUCATION/TRAINING PROGRAM

## 2023-10-08 PROCEDURE — 2500000001 HC RX 250 WO HCPCS SELF ADMINISTERED DRUGS (ALT 637 FOR MEDICARE OP): Performed by: STUDENT IN AN ORGANIZED HEALTH CARE EDUCATION/TRAINING PROGRAM

## 2023-10-08 PROCEDURE — 82330 ASSAY OF CALCIUM: CPT

## 2023-10-08 PROCEDURE — S4991 NICOTINE PATCH NONLEGEND: HCPCS | Performed by: STUDENT IN AN ORGANIZED HEALTH CARE EDUCATION/TRAINING PROGRAM

## 2023-10-08 PROCEDURE — 36415 COLL VENOUS BLD VENIPUNCTURE: CPT | Performed by: STUDENT IN AN ORGANIZED HEALTH CARE EDUCATION/TRAINING PROGRAM

## 2023-10-08 PROCEDURE — 99291 CRITICAL CARE FIRST HOUR: CPT | Performed by: NEUROLOGICAL SURGERY

## 2023-10-08 PROCEDURE — 2020000001 HC ICU ROOM DAILY

## 2023-10-08 PROCEDURE — 85730 THROMBOPLASTIN TIME PARTIAL: CPT | Performed by: STUDENT IN AN ORGANIZED HEALTH CARE EDUCATION/TRAINING PROGRAM

## 2023-10-08 PROCEDURE — 36415 COLL VENOUS BLD VENIPUNCTURE: CPT

## 2023-10-08 PROCEDURE — 2500000004 HC RX 250 GENERAL PHARMACY W/ HCPCS (ALT 636 FOR OP/ED): Performed by: STUDENT IN AN ORGANIZED HEALTH CARE EDUCATION/TRAINING PROGRAM

## 2023-10-08 PROCEDURE — 96372 THER/PROPH/DIAG INJ SC/IM: CPT | Performed by: STUDENT IN AN ORGANIZED HEALTH CARE EDUCATION/TRAINING PROGRAM

## 2023-10-08 RX ORDER — DEXAMETHASONE 2 MG/1
2 TABLET ORAL EVERY 8 HOURS SCHEDULED
Status: COMPLETED | OUTPATIENT
Start: 2023-10-08 | End: 2023-10-09

## 2023-10-08 RX ORDER — OXYCODONE HYDROCHLORIDE 5 MG/1
5 TABLET ORAL EVERY 4 HOURS PRN
Status: DISCONTINUED | OUTPATIENT
Start: 2023-10-08 | End: 2023-10-10

## 2023-10-08 RX ORDER — ACETAMINOPHEN 325 MG/1
650 TABLET ORAL EVERY 6 HOURS PRN
Status: DISCONTINUED | OUTPATIENT
Start: 2023-10-08 | End: 2023-10-10 | Stop reason: HOSPADM

## 2023-10-08 RX ORDER — OXYCODONE HYDROCHLORIDE 5 MG/1
10 TABLET ORAL EVERY 4 HOURS PRN
Status: DISCONTINUED | OUTPATIENT
Start: 2023-10-08 | End: 2023-10-10

## 2023-10-08 RX ADMIN — NIMODIPINE 60 MG: 30 CAPSULE, LIQUID FILLED ORAL at 10:26

## 2023-10-08 RX ADMIN — NIMODIPINE 60 MG: 30 CAPSULE, LIQUID FILLED ORAL at 05:32

## 2023-10-08 RX ADMIN — DEXAMETHASONE 2 MG: 2 TABLET ORAL at 18:42

## 2023-10-08 RX ADMIN — HEPARIN SODIUM 5000 UNITS: 5000 INJECTION INTRAVENOUS; SUBCUTANEOUS at 21:25

## 2023-10-08 RX ADMIN — OXYCODONE HYDROCHLORIDE 10 MG: 5 TABLET ORAL at 23:52

## 2023-10-08 RX ADMIN — NIMODIPINE 60 MG: 30 CAPSULE, LIQUID FILLED ORAL at 21:25

## 2023-10-08 RX ADMIN — SENNOSIDES AND DOCUSATE SODIUM 2 TABLET: 50; 8.6 TABLET ORAL at 21:25

## 2023-10-08 RX ADMIN — NIMODIPINE 60 MG: 30 CAPSULE, LIQUID FILLED ORAL at 17:54

## 2023-10-08 RX ADMIN — NIMODIPINE 60 MG: 30 CAPSULE, LIQUID FILLED ORAL at 01:22

## 2023-10-08 RX ADMIN — NIMODIPINE 60 MG: 30 CAPSULE, LIQUID FILLED ORAL at 14:20

## 2023-10-08 RX ADMIN — OXYCODONE HYDROCHLORIDE 5 MG: 5 TABLET ORAL at 08:19

## 2023-10-08 RX ADMIN — CYCLOBENZAPRINE 5 MG: 10 TABLET, FILM COATED ORAL at 16:09

## 2023-10-08 RX ADMIN — OXYCODONE HYDROCHLORIDE 5 MG: 5 TABLET ORAL at 14:20

## 2023-10-08 RX ADMIN — ACETAMINOPHEN 650 MG: 325 TABLET ORAL at 17:54

## 2023-10-08 RX ADMIN — OXYCODONE HYDROCHLORIDE 5 MG: 5 TABLET ORAL at 01:21

## 2023-10-08 RX ADMIN — HEPARIN SODIUM 5000 UNITS: 5000 INJECTION INTRAVENOUS; SUBCUTANEOUS at 14:21

## 2023-10-08 RX ADMIN — Medication 1 PATCH: at 08:19

## 2023-10-08 RX ADMIN — HEPARIN SODIUM 5000 UNITS: 5000 INJECTION INTRAVENOUS; SUBCUTANEOUS at 05:32

## 2023-10-08 RX ADMIN — LIDOCAINE 1 PATCH: 4 PATCH TOPICAL at 08:19

## 2023-10-08 RX ADMIN — ACETAMINOPHEN 650 MG: 325 TABLET ORAL at 12:07

## 2023-10-08 RX ADMIN — ACETAMINOPHEN 650 MG: 325 TABLET, FILM COATED ORAL at 01:22

## 2023-10-08 RX ADMIN — OXYCODONE HYDROCHLORIDE 10 MG: 5 TABLET ORAL at 18:20

## 2023-10-08 ASSESSMENT — PAIN - FUNCTIONAL ASSESSMENT
PAIN_FUNCTIONAL_ASSESSMENT: 0-10

## 2023-10-08 ASSESSMENT — PAIN SCALES - GENERAL
PAINLEVEL_OUTOF10: 7
PAINLEVEL_OUTOF10: 3
PAINLEVEL_OUTOF10: 2
PAINLEVEL_OUTOF10: 0 - NO PAIN
PAINLEVEL_OUTOF10: 7
PAINLEVEL_OUTOF10: 0 - NO PAIN
PAINLEVEL_OUTOF10: 3
PAINLEVEL_OUTOF10: 0 - NO PAIN
PAINLEVEL_OUTOF10: 0 - NO PAIN
PAINLEVEL_OUTOF10: 10 - WORST POSSIBLE PAIN
PAINLEVEL_OUTOF10: 3
PAINLEVEL_OUTOF10: 0 - NO PAIN
PAINLEVEL_OUTOF10: 7

## 2023-10-08 ASSESSMENT — PAIN DESCRIPTION - DESCRIPTORS
DESCRIPTORS: HEADACHE
DESCRIPTORS: ACHING
DESCRIPTORS: ACHING

## 2023-10-08 NOTE — PROGRESS NOTES
Subjective   HPI: Reshma Landers is a 37 y.o. female h/o tobacco use p/w WHOL on 9/29, CTH basal sylvian Subarachnoid hemorrhage (CMS/HCC) (HH2, mF3), PBD9, CTA 6mm basilar tip aneurysm     Interval Events: No events overnight. Back pain improved with lidocaine.      Objective    Vitals:    10/08/23 0300 10/08/23 0400 10/08/23 0500 10/08/23 0600   BP: 125/77 (!) 123/91 (!) 120/91 (!) 123/96   Pulse: 58 57 62 71   Resp: 18 15 16 14   Temp:  35.9 °C (96.6 °F)     TempSrc:  Temporal     SpO2: 96% 100% 100% 100%   Weight:       Height:                Results for orders placed or performed during the hospital encounter of 10/01/23 (from the past 24 hour(s))   POCT GLUCOSE   Result Value Ref Range    POCT Glucose 141 (H) 74 - 99 mg/dL   CBC   Result Value Ref Range    WBC 13.3 (H) 4.4 - 11.3 x10*3/uL    nRBC 0.0 0.0 - 0.0 /100 WBCs    RBC 4.12 4.00 - 5.20 x10*6/uL    Hemoglobin 12.5 12.0 - 16.0 g/dL    Hematocrit 38.3 36.0 - 46.0 %    MCV 93 80 - 100 fL    MCH 30.3 26.0 - 34.0 pg    MCHC 32.6 32.0 - 36.0 g/dL    RDW 12.5 11.5 - 14.5 %    Platelets 304 150 - 450 x10*3/uL    MPV 9.9 7.5 - 11.5 fL   Comprehensive metabolic panel   Result Value Ref Range    Glucose 85 74 - 99 mg/dL    Sodium 142 136 - 145 mmol/L    Potassium 4.3 3.5 - 5.3 mmol/L    Chloride 105 98 - 107 mmol/L    Bicarbonate 28 21 - 32 mmol/L    Anion Gap 13 10 - 20 mmol/L    Urea Nitrogen 16 6 - 23 mg/dL    Creatinine 0.62 0.50 - 1.05 mg/dL    eGFR >90 >60 mL/min/1.73m*2    Calcium 9.6 8.6 - 10.6 mg/dL    Albumin 4.3 3.4 - 5.0 g/dL    Alkaline Phosphatase 38 33 - 110 U/L    Total Protein 6.7 6.4 - 8.2 g/dL    AST 15 9 - 39 U/L    Bilirubin, Total 0.4 0.0 - 1.2 mg/dL    ALT 18 7 - 45 U/L   Calcium, Ionized   Result Value Ref Range    POCT Calcium, Ionized 1.25 1.1 - 1.33 mmol/L   Magnesium   Result Value Ref Range    Magnesium 1.97 1.60 - 2.40 mg/dL   Phosphorus   Result Value Ref Range    Phosphorus 3.9 2.5 - 4.9 mg/dL        Physical Exam  NEURO:  -Alert,  oriented x3  -Follows commands, 5/5 strength, no drift  CV:  -RRR on telemetry, NSR  RESP:  -Regular, unlabored  -Oxygen: Room air   :  - No catheter in place  GI:  -Abdomen NT/ND, soft  -Groin site intact, no hematoma  SKIN:  -Intact    Medications    Scheduled medications  acetaminophen, 650 mg, oral, 4x daily  heparin (porcine), 5,000 Units, subcutaneous, q8h  lidocaine, 1 patch, transdermal, Daily  nicotine, 1 patch, transdermal, Daily  niMODipine, 60 mg, oral, q4h   Or  niMODipine, 60 mg, oral, q4h  perflutren protein A microsphere, 0.5 mL, intravenous, Once in imaging  polyethylene glycol, 17 g, oral, Daily  sennosides-docusate sodium, 2 tablet, oral, BID  sulfur hexafluoride microsphr, 2 mL, intravenous, Once in imaging      Continuous medications     PRN medications  PRN medications: cyclobenzaprine, dextrose 10 % in water (D10W), dextrose, glucagon, hydrALAZINE, labetaloL, magnesium sulfate, oxyCODONE, potassium chloride CR **OR** potassium chloride, potassium chloride CR **OR** potassium chloride, potassium chloride      Lab Results  Lab Results   Component Value Date    WBC 13.3 (H) 10/08/2023    HGB 12.5 10/08/2023    HCT 38.3 10/08/2023    MCV 93 10/08/2023     10/08/2023     Lab Results   Component Value Date    GLUCOSE 85 10/08/2023    CALCIUM 9.6 10/08/2023     10/08/2023    K 4.3 10/08/2023    CO2 28 10/08/2023     10/08/2023    BUN 16 10/08/2023    CREATININE 0.62 10/08/2023        Assessment/Plan   Reshma Landers is a 37 y.o. woman with h/o tobacco use who p/w WHOL on 9/29, CTH basal sylvian subarachnoid hemorrhage (HH2, mF3), CTA 6mm basilar tip aneurysm, 10/2 s/p coil embolization of basilar tip aneurysm.       NEURO:  #HH2 mF3 SAH PBD 9, s/p coil embo of basilar tip aneurysm day 6  #tobacco dependence  Assessment:  -Neurologically: stable  Plan:  -NSU  -Q1H neuro checks  -Continue Nimodipine 60q4 x 21 days  -Pain: acetaminophen, oxycodone, hydromorphone PRN, lidocaine patch for  back  -Nausea: ondansetron PRN  -Repeat angio Monday 10/9 possible fast track pending angio  -Continue nicotine patch  -TCD M-F  -Strict I/O    CARDIOVASCULAR:  #No active issues  Assessment:  - SBP below goal  -TTE 10/3 with EF 65%, negative bubble     Plan:  -Continue to monitor on telemetry  -Goal SBP < 200    -->Nicardipine, Hydralazine and Labetalol PRN    RESPIRATORY:  #No active issues  Assessment:  -Stable  Plan:  -Continuous pulse oximetry     RENAL/:  #No active issues  Assessment:  -Baseline BUN/Cr: 12/0.75  -BUN/Cr stable     Plan:  -Monitor with daily RFP  - Strict I/O    FEN/GI:  Assessment:  -Last BM: 10/6  Plan:  -Monitor and replace electrolytes per protocol  -Diet: Regular diet   -Bowel Regimen: Docusate-Senna, Miralax     ENDOCRINE:  # No active issues  Assessment:  -BG controlled  Plan:  -Will add ISS if BG > 180    HEMATOLOGY:  #No active issues  Assessment:  -Baseline Hgb: 13.7  -Baseline Plts: 281  -Hgb within normal limits   Lab Results   Component Value Date    HGB 12.5 10/08/2023      -Plts:   Lab Results   Component Value Date     10/08/2023   Plan:  -Continue to monitor with daily CBC    INFECTIOUS DISEASE:  #Leukocytosis  Assessment:  -WBC mild elevation  -Afebrile  Lab Results   Component Value Date    WBC 13.3 (H) 10/08/2023     Plan:  -Continue to monitor for s/sx of infection  -Pan culture for temperature > 38.4 C    MUSCULOSKELETAL:  -No acute issues    SKIN:  -No acute issues  -Turns and skin care per NSU protocol    ACCESS:  - PIV x 2    PROPHYLAXIS:  -DVT/VTE: SCDs, SQH  -GI: not needed     RESTRAINTS:  Not indicated/Patient does not meet criteria for restraints    Boaz Lange MD    The patient is critically ill with aneurysmal subarachnoid hemorrhage  Neurologically stable  Cont nimodipine, euvolemia therapy, permissive hypertension  Encourage oral intake     I have seen and examined the patient.  I have reviewed the patient's laboratory, radiographic, and clinical  data.  I have spent 30 minutes providing critical care for the patient.       CARMELA Rehman M.D.

## 2023-10-08 NOTE — PROGRESS NOTES
"Reshma Landers is a 37 y.o. female on day 7 of admission presenting with Subarachnoid hemorrhage (CMS/HCC).    Subjective     No acute events overnight.       Objective     Physical Exam  A&Ox3  Face symmetric  Tongue midline  Fcx4 5/5  SILT    Last Recorded Vitals  Blood pressure 135/89, pulse 62, temperature 36.5 °C (97.7 °F), temperature source Temporal, resp. rate 13, height 1.651 m (5' 5\"), weight 59 kg (130 lb 1.1 oz), SpO2 98 %.  Intake/Output last 3 Shifts:  I/O last 3 completed shifts:  In: 2760 (46.8 mL/kg) [P.O.:2760]  Out: 3950 (66.9 mL/kg) [Urine:3950 (1.9 mL/kg/hr)]  Weight: 59 kg     Relevant Results  Results for orders placed or performed during the hospital encounter of 10/01/23 (from the past 24 hour(s))   CBC   Result Value Ref Range    WBC 13.3 (H) 4.4 - 11.3 x10*3/uL    nRBC 0.0 0.0 - 0.0 /100 WBCs    RBC 4.12 4.00 - 5.20 x10*6/uL    Hemoglobin 12.5 12.0 - 16.0 g/dL    Hematocrit 38.3 36.0 - 46.0 %    MCV 93 80 - 100 fL    MCH 30.3 26.0 - 34.0 pg    MCHC 32.6 32.0 - 36.0 g/dL    RDW 12.5 11.5 - 14.5 %    Platelets 304 150 - 450 x10*3/uL    MPV 9.9 7.5 - 11.5 fL   Comprehensive metabolic panel   Result Value Ref Range    Glucose 85 74 - 99 mg/dL    Sodium 142 136 - 145 mmol/L    Potassium 4.3 3.5 - 5.3 mmol/L    Chloride 105 98 - 107 mmol/L    Bicarbonate 28 21 - 32 mmol/L    Anion Gap 13 10 - 20 mmol/L    Urea Nitrogen 16 6 - 23 mg/dL    Creatinine 0.62 0.50 - 1.05 mg/dL    eGFR >90 >60 mL/min/1.73m*2    Calcium 9.6 8.6 - 10.6 mg/dL    Albumin 4.3 3.4 - 5.0 g/dL    Alkaline Phosphatase 38 33 - 110 U/L    Total Protein 6.7 6.4 - 8.2 g/dL    AST 15 9 - 39 U/L    Bilirubin, Total 0.4 0.0 - 1.2 mg/dL    ALT 18 7 - 45 U/L   Calcium, Ionized   Result Value Ref Range    POCT Calcium, Ionized 1.25 1.1 - 1.33 mmol/L   Magnesium   Result Value Ref Range    Magnesium 1.97 1.60 - 2.40 mg/dL   Phosphorus   Result Value Ref Range    Phosphorus 3.9 2.5 - 4.9 mg/dL       Assessment/Plan   Principal Problem:    " Subarachnoid hemorrhage (CMS/HCC)    Assessment:  Resham is a 37 y.o. female with a principal problem of Subarachnoid hemorrhage (CMS/HCC).     Additional active problems include:  Principal Problem:    Subarachnoid hemorrhage (CMS/HCC)     37 year old female with h/o smoking, uncle  2/2 aneurysmal hemorrhage p/w 2 days headache and nuchal rigidity, CTH basal/Sylvian SAH, CTA 6mm basilar tip aneurysm.     10/2 s/p coil embo of basilar tip aneurysm (small remnant)  10/3 TTE EF 65%     Plan:  NSU  Q1 neurochecks  Plan for repeat angiogram on Mon 10/9  SBP <200  I/O euvolemic  TCDs  PTOT- NN    Brianda Fernandez MD

## 2023-10-08 NOTE — CARE PLAN
Problem: Safety - Adult  Goal: Free from fall injury  Outcome: Progressing     Problem: General Stroke  Goal: Demonstrate improvement in neurological exam throughout the shift  Outcome: Progressing  Goal: Maintain BP within ordered limits throughout shift  Outcome: Progressing  Goal: No symptoms of aspiration throughout shift  Outcome: Progressing  Goal: No symptoms of hemorrhage throughout shift  Outcome: Progressing  Goal: Tolerate enteral feeding throughout shift  Outcome: Progressing  Goal: Decreased nausea/vomiting throughout shift  Outcome: Progressing  Goal: Controlled blood glucose throughout shift  Outcome: Progressing  Goal: Out of bed three times today  Outcome: Progressing     Problem: ICU Stroke  Goal: Maintain ICP within ordered limits throughout shift  Outcome: Progressing  Goal: Tolerate EVD clamping trial throughout shift  Outcome: Progressing  Goal: Tolerate ventilator weaning trial during shift  Outcome: Progressing  Goal: Maintain patent airway throughout shift  Outcome: Progressing  Goal: Achieve/maintain targeted sodium level throughout shift  Outcome: Progressing   The patient's goals for the shift include to have decreased headache pain.    The clinical goals for the shift include Pt will remain neuroplogically stable during shift

## 2023-10-08 NOTE — CARE PLAN
Problem: Safety - Adult  Goal: Free from fall injury  Outcome: Progressing     Problem: General Stroke  Goal: Demonstrate improvement in neurological exam throughout the shift  Outcome: Progressing  Goal: Maintain BP within ordered limits throughout shift  Outcome: Progressing  Goal: No symptoms of aspiration throughout shift  Outcome: Progressing  Goal: No symptoms of hemorrhage throughout shift  Outcome: Progressing  Goal: Tolerate enteral feeding throughout shift  Outcome: Progressing  Goal: Decreased nausea/vomiting throughout shift  Outcome: Progressing  Goal: Controlled blood glucose throughout shift  Outcome: Progressing  Goal: Out of bed three times today  Outcome: Progressing     Problem: ICU Stroke  Goal: Maintain patent airway throughout shift  Outcome: Progressing  Goal: Achieve/maintain targeted sodium level throughout shift  Outcome: Progressing     The patient's goals for the shift include to have decreased headache pain.    The clinical goals for the shift include Pt will remain hemodynamically stable during shift

## 2023-10-09 ENCOUNTER — APPOINTMENT (OUTPATIENT)
Dept: RADIOLOGY | Facility: HOSPITAL | Age: 38
DRG: 022 | End: 2023-10-09
Payer: COMMERCIAL

## 2023-10-09 ENCOUNTER — APPOINTMENT (OUTPATIENT)
Dept: VASCULAR MEDICINE | Facility: HOSPITAL | Age: 38
DRG: 022 | End: 2023-10-09
Payer: COMMERCIAL

## 2023-10-09 LAB
ALBUMIN SERPL BCP-MCNC: 4.4 G/DL (ref 3.4–5)
ALP SERPL-CCNC: 47 U/L (ref 33–110)
ALT SERPL W P-5'-P-CCNC: 71 U/L (ref 7–45)
ANION GAP SERPL CALC-SCNC: 15 MMOL/L (ref 10–20)
AST SERPL W P-5'-P-CCNC: 54 U/L (ref 9–39)
BILIRUB SERPL-MCNC: 0.4 MG/DL (ref 0–1.2)
BUN SERPL-MCNC: 13 MG/DL (ref 6–23)
CALCIUM SERPL-MCNC: 9.9 MG/DL (ref 8.6–10.6)
CHLORIDE SERPL-SCNC: 103 MMOL/L (ref 98–107)
CO2 SERPL-SCNC: 28 MMOL/L (ref 21–32)
CREAT SERPL-MCNC: 0.6 MG/DL (ref 0.5–1.05)
GFR SERPL CREATININE-BSD FRML MDRD: >90 ML/MIN/1.73M*2
GLUCOSE SERPL-MCNC: 91 MG/DL (ref 74–99)
POTASSIUM SERPL-SCNC: 4.9 MMOL/L (ref 3.5–5.3)
PROT SERPL-MCNC: 7.2 G/DL (ref 6.4–8.2)
SODIUM SERPL-SCNC: 141 MMOL/L (ref 136–145)

## 2023-10-09 PROCEDURE — 36224 PLACE CATH CAROTD ART: CPT | Performed by: NEUROLOGICAL SURGERY

## 2023-10-09 PROCEDURE — 2500000004 HC RX 250 GENERAL PHARMACY W/ HCPCS (ALT 636 FOR OP/ED): Performed by: STUDENT IN AN ORGANIZED HEALTH CARE EDUCATION/TRAINING PROGRAM

## 2023-10-09 PROCEDURE — 36222 PLACE CATH CAROTID/INOM ART: CPT | Mod: 50

## 2023-10-09 PROCEDURE — 2500000002 HC RX 250 W HCPCS SELF ADMINISTERED DRUGS (ALT 637 FOR MEDICARE OP, ALT 636 FOR OP/ED): Performed by: STUDENT IN AN ORGANIZED HEALTH CARE EDUCATION/TRAINING PROGRAM

## 2023-10-09 PROCEDURE — 36415 COLL VENOUS BLD VENIPUNCTURE: CPT | Performed by: STUDENT IN AN ORGANIZED HEALTH CARE EDUCATION/TRAINING PROGRAM

## 2023-10-09 PROCEDURE — 99153 MOD SED SAME PHYS/QHP EA: CPT | Performed by: NEUROLOGICAL SURGERY

## 2023-10-09 PROCEDURE — 2720000007 HC OR 272 NO HCPCS

## 2023-10-09 PROCEDURE — 80053 COMPREHEN METABOLIC PANEL: CPT | Performed by: STUDENT IN AN ORGANIZED HEALTH CARE EDUCATION/TRAINING PROGRAM

## 2023-10-09 PROCEDURE — C1760 CLOSURE DEV, VASC: HCPCS

## 2023-10-09 PROCEDURE — 36226 PLACE CATH VERTEBRAL ART: CPT | Performed by: NEUROLOGICAL SURGERY

## 2023-10-09 PROCEDURE — 99291 CRITICAL CARE FIRST HOUR: CPT | Performed by: PSYCHIATRY & NEUROLOGY

## 2023-10-09 PROCEDURE — 99152 MOD SED SAME PHYS/QHP 5/>YRS: CPT

## 2023-10-09 PROCEDURE — 99153 MOD SED SAME PHYS/QHP EA: CPT

## 2023-10-09 PROCEDURE — 2500000001 HC RX 250 WO HCPCS SELF ADMINISTERED DRUGS (ALT 637 FOR MEDICARE OP): Performed by: STUDENT IN AN ORGANIZED HEALTH CARE EDUCATION/TRAINING PROGRAM

## 2023-10-09 PROCEDURE — 36226 PLACE CATH VERTEBRAL ART: CPT

## 2023-10-09 PROCEDURE — 2500000001 HC RX 250 WO HCPCS SELF ADMINISTERED DRUGS (ALT 637 FOR MEDICARE OP)

## 2023-10-09 PROCEDURE — 75710 ARTERY X-RAYS ARM/LEG: CPT | Mod: RT

## 2023-10-09 PROCEDURE — 99152 MOD SED SAME PHYS/QHP 5/>YRS: CPT | Performed by: NEUROLOGICAL SURGERY

## 2023-10-09 PROCEDURE — 2780000003 HC OR 278 NO HCPCS

## 2023-10-09 PROCEDURE — 2550000001 HC RX 255 CONTRASTS: Performed by: NEUROLOGICAL SURGERY

## 2023-10-09 PROCEDURE — 96372 THER/PROPH/DIAG INJ SC/IM: CPT | Performed by: STUDENT IN AN ORGANIZED HEALTH CARE EDUCATION/TRAINING PROGRAM

## 2023-10-09 PROCEDURE — S4991 NICOTINE PATCH NONLEGEND: HCPCS | Performed by: STUDENT IN AN ORGANIZED HEALTH CARE EDUCATION/TRAINING PROGRAM

## 2023-10-09 PROCEDURE — 93886 INTRACRANIAL COMPLETE STUDY: CPT

## 2023-10-09 PROCEDURE — C1769 GUIDE WIRE: HCPCS

## 2023-10-09 PROCEDURE — 93886 INTRACRANIAL COMPLETE STUDY: CPT | Performed by: PSYCHIATRY & NEUROLOGY

## 2023-10-09 PROCEDURE — 2500000004 HC RX 250 GENERAL PHARMACY W/ HCPCS (ALT 636 FOR OP/ED): Performed by: NEUROLOGICAL SURGERY

## 2023-10-09 PROCEDURE — 2060000001 HC INTERMEDIATE ICU ROOM DAILY

## 2023-10-09 PROCEDURE — 2500000005 HC RX 250 GENERAL PHARMACY W/O HCPCS: Performed by: REGISTERED NURSE

## 2023-10-09 RX ORDER — MIDAZOLAM HYDROCHLORIDE 1 MG/ML
INJECTION INTRAMUSCULAR; INTRAVENOUS AS NEEDED
Status: COMPLETED | OUTPATIENT
Start: 2023-10-09 | End: 2023-10-09

## 2023-10-09 RX ORDER — FENTANYL CITRATE 50 UG/ML
INJECTION, SOLUTION INTRAMUSCULAR; INTRAVENOUS AS NEEDED
Status: COMPLETED | OUTPATIENT
Start: 2023-10-09 | End: 2023-10-09

## 2023-10-09 RX ADMIN — NIMODIPINE 60 MG: 30 CAPSULE, LIQUID FILLED ORAL at 19:54

## 2023-10-09 RX ADMIN — DEXAMETHASONE 2 MG: 2 TABLET ORAL at 15:03

## 2023-10-09 RX ADMIN — DEXAMETHASONE 2 MG: 2 TABLET ORAL at 05:12

## 2023-10-09 RX ADMIN — HEPARIN SODIUM 5000 UNITS: 5000 INJECTION INTRAVENOUS; SUBCUTANEOUS at 15:05

## 2023-10-09 RX ADMIN — HEPARIN SODIUM 5000 UNITS: 5000 INJECTION INTRAVENOUS; SUBCUTANEOUS at 05:12

## 2023-10-09 RX ADMIN — OXYCODONE HYDROCHLORIDE 5 MG: 5 TABLET ORAL at 06:35

## 2023-10-09 RX ADMIN — SODIUM CHLORIDE 500 ML: 9 INJECTION, SOLUTION INTRAVENOUS at 04:29

## 2023-10-09 RX ADMIN — Medication 1 PATCH: at 12:14

## 2023-10-09 RX ADMIN — ACETAMINOPHEN 650 MG: 325 TABLET ORAL at 08:04

## 2023-10-09 RX ADMIN — NIMODIPINE 60 MG: 30 CAPSULE, LIQUID FILLED ORAL at 05:12

## 2023-10-09 RX ADMIN — ACETAMINOPHEN 650 MG: 325 TABLET ORAL at 18:13

## 2023-10-09 RX ADMIN — NIMODIPINE 60 MG: 30 CAPSULE, LIQUID FILLED ORAL at 16:59

## 2023-10-09 RX ADMIN — IOHEXOL 100 ML: 350 INJECTION, SOLUTION INTRAVENOUS at 14:25

## 2023-10-09 RX ADMIN — NIMODIPINE 60 MG: 30 CAPSULE, LIQUID FILLED ORAL at 01:33

## 2023-10-09 RX ADMIN — OXYCODONE HYDROCHLORIDE 10 MG: 5 TABLET ORAL at 15:03

## 2023-10-09 RX ADMIN — NIMODIPINE 60 MG: 30 CAPSULE, LIQUID FILLED ORAL at 12:14

## 2023-10-09 RX ADMIN — CYCLOBENZAPRINE 5 MG: 10 TABLET, FILM COATED ORAL at 19:53

## 2023-10-09 RX ADMIN — FENTANYL CITRATE 50 MCG: 50 INJECTION, SOLUTION INTRAMUSCULAR; INTRAVENOUS at 11:20

## 2023-10-09 RX ADMIN — OXYCODONE HYDROCHLORIDE 10 MG: 5 TABLET ORAL at 20:01

## 2023-10-09 RX ADMIN — LIDOCAINE 1 PATCH: 4 PATCH TOPICAL at 08:04

## 2023-10-09 RX ADMIN — MIDAZOLAM HYDROCHLORIDE 1 MG: 1 INJECTION, SOLUTION INTRAMUSCULAR; INTRAVENOUS at 11:20

## 2023-10-09 ASSESSMENT — PAIN SCALES - GENERAL
PAINLEVEL_OUTOF10: 0 - NO PAIN
PAINLEVEL_OUTOF10: 5 - MODERATE PAIN
PAINLEVEL_OUTOF10: 0 - NO PAIN
PAINLEVEL_OUTOF10: 2
PAINLEVEL_OUTOF10: 7
PAINLEVEL_OUTOF10: 0 - NO PAIN
PAINLEVEL_OUTOF10: 0 - NO PAIN
PAINLEVEL_OUTOF10: 3
PAINLEVEL_OUTOF10: 0 - NO PAIN
PAINLEVEL_OUTOF10: 5 - MODERATE PAIN
PAINLEVEL_OUTOF10: 0 - NO PAIN
PAINLEVEL_OUTOF10: 5 - MODERATE PAIN
PAINLEVEL_OUTOF10: 5 - MODERATE PAIN
PAINLEVEL_OUTOF10: 0 - NO PAIN
PAINLEVEL_OUTOF10: 6
PAINLEVEL_OUTOF10: 5 - MODERATE PAIN
PAINLEVEL_OUTOF10: 3
PAINLEVEL_OUTOF10: 0 - NO PAIN
PAINLEVEL_OUTOF10: 5 - MODERATE PAIN
PAINLEVEL_OUTOF10: 10 - WORST POSSIBLE PAIN
PAINLEVEL_OUTOF10: 5 - MODERATE PAIN
PAINLEVEL_OUTOF10: 0 - NO PAIN
PAINLEVEL_OUTOF10: 5 - MODERATE PAIN

## 2023-10-09 ASSESSMENT — PAIN DESCRIPTION - DESCRIPTORS: DESCRIPTORS: ACHING

## 2023-10-09 ASSESSMENT — COGNITIVE AND FUNCTIONAL STATUS - GENERAL
DAILY ACTIVITIY SCORE: 24
MOBILITY SCORE: 24

## 2023-10-09 ASSESSMENT — PAIN - FUNCTIONAL ASSESSMENT

## 2023-10-09 NOTE — PROCEDURES
Pre-Procedure Verification and Time Out:  Procedure Location: procedure area  HUDDLE - Pre-procedure Verification:  completed  TIME OUT - Final Verification:  completed immediately prior to procedure start  DEBRIEF: completed    Complications:  None; patient tolerated the procedure well.     Disposition: NSU  Condition: stable  Specimens Collected: No specimens collected    General Information:   Anesthesia/ sedation: Non-Anesthesia  Indication(s)/Pre - Procedure Diagnoses: basilar artery aneurysm s/p coiling  Post-Procedure Diagnosis: stable basilar artery aneurysm remnant, no flow limiting vasospasm  Procedure Name: Diagnostic Cerebral Angiogram  Procedure performed by: Oliver  Assistant(s): Christian  Estimated Blood Loss (mL): 15  Specimen: no  Informed Consent: consent obtained and in chart    Access: 5 Fr Sheath in R CFA  Closure: Mynx  Vessels Injected: R ICA, L ICA, L vert, R CFA  Moderate Sedation Time: 30 mins  Findings: Stable basilar artery aneurysm remnant, no flow limiting vasospasm. Full report to follow in PACS dictation

## 2023-10-09 NOTE — PROGRESS NOTES
Physical Therapy    General  Missed Visit: Yes  Missed Visit Reason:  (Patient currently off unit for angiogram.  Will follow up as appropriate.)      10/09/23 at 9:13 AM - Bo Mariee, PT

## 2023-10-09 NOTE — PRE-PROCEDURE NOTE
Pre-Procedure H&P     Provider Assessment:  Diagnosis/Reason for Procedure: SAH/cerebral aneurysm s/p coil embolization  Procedure: Diagnostic Cerebral Angiogram  Medications Reviewed:   yes   Prophylatic Antibiotics Needed:   no    Neuro status: A&Ox3, moving all extremities full strength   Mouth Opening OK: yes   Neck Flexibility OK: yes   Sedation Plan: moderate sedation   COVID-19 Risk Consent:  Surgeon has reviewed key risks related to the risk of josé manuel COVID-19 and if they contract COVID-19 what the risks are.

## 2023-10-09 NOTE — PROGRESS NOTES
"Reshma Landers is a 37 y.o. female on day 8 of admission presenting with Subarachnoid hemorrhage (CMS/HCC).    Subjective     No acute events overnight.  Patient still with moderate headache.         Objective     Physical Exam  A&Ox3  Face symmetric  Tongue midline  Fcx4 5/5  SILT    Last Recorded Vitals  Blood pressure (!) 123/95, pulse 76, temperature 36.4 °C (97.5 °F), temperature source Temporal, resp. rate 14, height 1.651 m (5' 5\"), weight 59 kg (130 lb 1.1 oz), SpO2 99 %.  Intake/Output last 3 Shifts:  I/O last 3 completed shifts:  In: 2280 (38.6 mL/kg) [P.O.:2280]  Out: 4250 (72 mL/kg) [Urine:4250 (2 mL/kg/hr)]  Weight: 59 kg     Relevant Results  Results for orders placed or performed during the hospital encounter of 10/01/23 (from the past 24 hour(s))   CBC   Result Value Ref Range    WBC 13.3 (H) 4.4 - 11.3 x10*3/uL    nRBC 0.0 0.0 - 0.0 /100 WBCs    RBC 4.12 4.00 - 5.20 x10*6/uL    Hemoglobin 12.5 12.0 - 16.0 g/dL    Hematocrit 38.3 36.0 - 46.0 %    MCV 93 80 - 100 fL    MCH 30.3 26.0 - 34.0 pg    MCHC 32.6 32.0 - 36.0 g/dL    RDW 12.5 11.5 - 14.5 %    Platelets 304 150 - 450 x10*3/uL    MPV 9.9 7.5 - 11.5 fL   Comprehensive metabolic panel   Result Value Ref Range    Glucose 85 74 - 99 mg/dL    Sodium 142 136 - 145 mmol/L    Potassium 4.3 3.5 - 5.3 mmol/L    Chloride 105 98 - 107 mmol/L    Bicarbonate 28 21 - 32 mmol/L    Anion Gap 13 10 - 20 mmol/L    Urea Nitrogen 16 6 - 23 mg/dL    Creatinine 0.62 0.50 - 1.05 mg/dL    eGFR >90 >60 mL/min/1.73m*2    Calcium 9.6 8.6 - 10.6 mg/dL    Albumin 4.3 3.4 - 5.0 g/dL    Alkaline Phosphatase 38 33 - 110 U/L    Total Protein 6.7 6.4 - 8.2 g/dL    AST 15 9 - 39 U/L    Bilirubin, Total 0.4 0.0 - 1.2 mg/dL    ALT 18 7 - 45 U/L   Calcium, Ionized   Result Value Ref Range    POCT Calcium, Ionized 1.25 1.1 - 1.33 mmol/L   Magnesium   Result Value Ref Range    Magnesium 1.97 1.60 - 2.40 mg/dL   Phosphorus   Result Value Ref Range    Phosphorus 3.9 2.5 - 4.9 mg/dL "   Coagulation Screen   Result Value Ref Range    Protime 11.4 9.8 - 12.8 seconds    INR 1.0 0.9 - 1.1    aPTT 30 27 - 38 seconds       Assessment/Plan   Principal Problem:    Subarachnoid hemorrhage (CMS/HCC)    Assessment:  Reshma is a 37 y.o. female with a principal problem of Subarachnoid hemorrhage (CMS/HCC).     Additional active problems include:  Principal Problem:    Subarachnoid hemorrhage (CMS/HCC)     37 year old female with h/o smoking, uncle  2/2 aneurysmal hemorrhage p/w 2 days headache and nuchal rigidity, CTH basal/Sylvian SAH, CTA 6mm basilar tip aneurysm.     10/2 s/p coil embo of basilar tip aneurysm (small remnant)  10/3 TTE EF 65%     Plan:  NSU  Q1 neurochecks  Repeat angiogram today, Mon 10/9, possibly fast track after  SBP <200  I/O euvolemic  TCDs  PTOT- NN    Maylin Rosas MD    Patient has small stable base remnant of coiled basilar tip aneurysm and no significant vasospasm on cerebral angiogram today; she can be discharged home tomorrow (fast track protocol for low grade aSAH) with 6 month mra w/wo post col protocol     Oren Boyd MD

## 2023-10-09 NOTE — PROGRESS NOTES
Subjective   HPI: Reshma Landers is a 37 y.o. female h/o tobacco use p/w worst headache of life on 09/29. CTH basal sylvian Subarachnoid hemorrhage (CMS/HCC) (HH2, mF3), post bleed day 9, CTA 6mm basilar tip aneurysm.    Interval Events: No events overnight.     Objective  24 HOUR VITALS  Heart Rate:  [58-96]   Temp:  [35.8 °C (96.4 °F)-36.8 °C (98.3 °F)]   Resp:  [9-31]   BP: ()/()   SpO2:  [91 %-100 %]           Results for orders placed or performed during the hospital encounter of 10/01/23 (from the past 24 hour(s))   Coagulation Screen   Result Value Ref Range    Protime 11.4 9.8 - 12.8 seconds    INR 1.0 0.9 - 1.1    aPTT 30 27 - 38 seconds   Comprehensive metabolic panel   Result Value Ref Range    Glucose 91 74 - 99 mg/dL    Sodium 141 136 - 145 mmol/L    Potassium 4.9 3.5 - 5.3 mmol/L    Chloride 103 98 - 107 mmol/L    Bicarbonate 28 21 - 32 mmol/L    Anion Gap 15 10 - 20 mmol/L    Urea Nitrogen 13 6 - 23 mg/dL    Creatinine 0.60 0.50 - 1.05 mg/dL    eGFR >90 >60 mL/min/1.73m*2    Calcium 9.9 8.6 - 10.6 mg/dL    Albumin 4.4 3.4 - 5.0 g/dL    Alkaline Phosphatase 47 33 - 110 U/L    Total Protein 7.2 6.4 - 8.2 g/dL    AST 54 (H) 9 - 39 U/L    Bilirubin, Total 0.4 0.0 - 1.2 mg/dL    ALT 71 (H) 7 - 45 U/L        Physical Exam    NEURO:  -Alert, oriented x3  -Follows commands, 5/5 strength, no drift  CV:  -RRR on telemetry, NSR  RESP:  -Regular, unlabored  -Oxygen: Room air   :  - No catheter in place  GI:  -Abdomen NT/ND, soft  -Groin site intact, no hematoma  SKIN:  -Intact    Medications    Scheduled medications  dexAMETHasone, 2 mg, oral, q8h CAMILA  heparin (porcine), 5,000 Units, subcutaneous, q8h  lidocaine, 1 patch, transdermal, Daily  nicotine, 1 patch, transdermal, Daily  niMODipine, 60 mg, oral, q4h   Or  niMODipine, 60 mg, oral, q4h  perflutren protein A microsphere, 0.5 mL, intravenous, Once in imaging  polyethylene glycol, 17 g, oral, Daily  sennosides-docusate sodium, 2 tablet, oral,  BID  sulfur hexafluoride microsphr, 2 mL, intravenous, Once in imaging      Continuous medications     PRN medications  PRN medications: acetaminophen, cyclobenzaprine, dextrose 10 % in water (D10W), dextrose, glucagon, hydrALAZINE, labetaloL, magnesium sulfate, oxyCODONE, oxyCODONE, potassium chloride CR **OR** potassium chloride, potassium chloride CR **OR** potassium chloride, potassium chloride      Lab Results  Lab Results   Component Value Date    WBC 13.3 (H) 10/08/2023    HGB 12.5 10/08/2023    HCT 38.3 10/08/2023    MCV 93 10/08/2023     10/08/2023     Lab Results   Component Value Date    GLUCOSE 91 10/09/2023    CALCIUM 9.9 10/09/2023     10/09/2023    K 4.9 10/09/2023    CO2 28 10/09/2023     10/09/2023    BUN 13 10/09/2023    CREATININE 0.60 10/09/2023        Assessment/Plan   Reshma Landers is a 37 y.o. woman with h/o tobacco use who p/w WHOL on 9/29, CTH basal sylvian subarachnoid hemorrhage (HH2, mF3), CTA 6mm basilar tip aneurysm, 10/2 s/p coil embolization of basilar tip aneurysm.     NEURO:  #HH2 mF3 SAH PBD 9, s/p coil embo of basilar tip aneurysm day 6  #tobacco dependence  Assessment:  -Neurologically: stable  Plan:  -NSU  -Q1H neuro checks  -Continue Nimodipine 60q4 x 21 days  -Pain: acetaminophen, oxycodone, hydromorphone PRN, lidocaine patch for back  -Nausea: ondansetron PRN  -Repeat angio Monday 10/9 possible fast track pending angio  -Continue nicotine patch  -TCD M-F  -Strict I/O    CARDIOVASCULAR:  #No active issues  Assessment:  - SBP below goal  -TTE 10/3 with EF 65%, negative bubble     Plan:  -Continue to monitor on telemetry  -Goal SBP < 200    -->Nicardipine, Hydralazine and Labetalol PRN    RESPIRATORY:  #No active issues  Assessment:  -Stable  Plan:  -Continuous pulse oximetry     RENAL/:  #No active issues  Assessment:  -Baseline BUN/Cr: 12/0.75  -BUN/Cr stable     Plan:  -Monitor with daily RFP  - Strict I/O    FEN/GI:  Assessment:  -Last BM:  10/6  Plan:  -Monitor and replace electrolytes per protocol  -Diet: Regular diet   -Bowel Regimen: Docusate-Senna, Miralax     ENDOCRINE:  # No active issues  Assessment:  -BG controlled  Plan:  -Will add ISS if BG > 180    HEMATOLOGY:  #No active issues  Assessment:  -Baseline Hgb: 13.7  -Baseline Plts: 281  -Hgb within normal limits   Lab Results   Component Value Date    HGB 12.5 10/08/2023      -Plts:   Lab Results   Component Value Date     10/08/2023   Plan:  -Continue to monitor with daily CBC    INFECTIOUS DISEASE:  #Leukocytosis  Assessment:  -WBC mild elevation  -Afebrile  Lab Results   Component Value Date    WBC 13.3 (H) 10/08/2023     Plan:  -Continue to monitor for s/sx of infection  -Pan culture for temperature > 38.4 C    MUSCULOSKELETAL:  -No acute issues    SKIN:  -No acute issues  -Turns and skin care per NSU protocol    ACCESS:  - PIV x 2    PROPHYLAXIS:  -DVT/VTE: SCDs, SQH  -GI: not needed     RESTRAINTS:  Not indicated/Patient does not meet criteria for restraints    Nuzhat León MD    37 year old woman with WHOL on 10/1/23. SAH from basilar tip SAH (HH2,, mF3). Angio today.  Exam - intact. Following TCDs.  Cardiac - stable, EF 65%  Pulm - stable  Vasc- subcutaneous heparin

## 2023-10-10 VITALS
HEIGHT: 65 IN | WEIGHT: 130.07 LBS | BODY MASS INDEX: 21.67 KG/M2 | RESPIRATION RATE: 20 BRPM | DIASTOLIC BLOOD PRESSURE: 92 MMHG | HEART RATE: 72 BPM | SYSTOLIC BLOOD PRESSURE: 144 MMHG | OXYGEN SATURATION: 98 % | TEMPERATURE: 95.5 F

## 2023-10-10 DIAGNOSIS — I67.1 CEREBRAL ANEURYSM (HHS-HCC): ICD-10-CM

## 2023-10-10 LAB
ALBUMIN SERPL BCP-MCNC: 4.4 G/DL (ref 3.4–5)
ALP SERPL-CCNC: 48 U/L (ref 33–110)
ALT SERPL W P-5'-P-CCNC: 51 U/L (ref 7–45)
AST SERPL W P-5'-P-CCNC: 24 U/L (ref 9–39)
BILIRUB DIRECT SERPL-MCNC: 0.1 MG/DL (ref 0–0.3)
BILIRUB SERPL-MCNC: 0.4 MG/DL (ref 0–1.2)
ERYTHROCYTE [DISTWIDTH] IN BLOOD BY AUTOMATED COUNT: 13.1 % (ref 11.5–14.5)
HCT VFR BLD AUTO: 35.6 % (ref 36–46)
HGB BLD-MCNC: 11.9 G/DL (ref 12–16)
MAGNESIUM SERPL-MCNC: 1.96 MG/DL (ref 1.6–2.4)
MCH RBC QN AUTO: 30.6 PG (ref 26–34)
MCHC RBC AUTO-ENTMCNC: 33.4 G/DL (ref 32–36)
MCV RBC AUTO: 92 FL (ref 80–100)
NRBC BLD-RTO: 0 /100 WBCS (ref 0–0)
PLATELET # BLD AUTO: 322 X10*3/UL (ref 150–450)
PMV BLD AUTO: 9.6 FL (ref 7.5–11.5)
PROT SERPL-MCNC: 6.9 G/DL (ref 6.4–8.2)
RBC # BLD AUTO: 3.89 X10*6/UL (ref 4–5.2)
WBC # BLD AUTO: 13.1 X10*3/UL (ref 4.4–11.3)

## 2023-10-10 PROCEDURE — 85027 COMPLETE CBC AUTOMATED: CPT

## 2023-10-10 PROCEDURE — 96372 THER/PROPH/DIAG INJ SC/IM: CPT | Performed by: STUDENT IN AN ORGANIZED HEALTH CARE EDUCATION/TRAINING PROGRAM

## 2023-10-10 PROCEDURE — 36415 COLL VENOUS BLD VENIPUNCTURE: CPT

## 2023-10-10 PROCEDURE — 2500000001 HC RX 250 WO HCPCS SELF ADMINISTERED DRUGS (ALT 637 FOR MEDICARE OP): Performed by: STUDENT IN AN ORGANIZED HEALTH CARE EDUCATION/TRAINING PROGRAM

## 2023-10-10 PROCEDURE — 99239 HOSP IP/OBS DSCHRG MGMT >30: CPT | Performed by: NURSE PRACTITIONER

## 2023-10-10 PROCEDURE — 2500000001 HC RX 250 WO HCPCS SELF ADMINISTERED DRUGS (ALT 637 FOR MEDICARE OP)

## 2023-10-10 PROCEDURE — 2500000004 HC RX 250 GENERAL PHARMACY W/ HCPCS (ALT 636 FOR OP/ED): Performed by: STUDENT IN AN ORGANIZED HEALTH CARE EDUCATION/TRAINING PROGRAM

## 2023-10-10 PROCEDURE — 80076 HEPATIC FUNCTION PANEL: CPT

## 2023-10-10 PROCEDURE — 83735 ASSAY OF MAGNESIUM: CPT

## 2023-10-10 RX ORDER — POLYETHYLENE GLYCOL 3350 17 G/17G
17 POWDER, FOR SOLUTION ORAL DAILY
Status: DISCONTINUED | OUTPATIENT
Start: 2023-10-10 | End: 2023-10-10 | Stop reason: HOSPADM

## 2023-10-10 RX ORDER — ACETAMINOPHEN 325 MG/1
650 TABLET ORAL EVERY 6 HOURS PRN
Qty: 30 TABLET | Refills: 0
Start: 2023-10-10

## 2023-10-10 RX ORDER — AMOXICILLIN 250 MG
2 CAPSULE ORAL 2 TIMES DAILY
Status: DISCONTINUED | OUTPATIENT
Start: 2023-10-10 | End: 2023-10-10 | Stop reason: HOSPADM

## 2023-10-10 RX ADMIN — OXYCODONE HYDROCHLORIDE 10 MG: 5 TABLET ORAL at 01:06

## 2023-10-10 RX ADMIN — HEPARIN SODIUM 5000 UNITS: 5000 INJECTION INTRAVENOUS; SUBCUTANEOUS at 05:09

## 2023-10-10 RX ADMIN — NIMODIPINE 60 MG: 30 CAPSULE, LIQUID FILLED ORAL at 08:13

## 2023-10-10 RX ADMIN — NIMODIPINE 60 MG: 30 CAPSULE, LIQUID FILLED ORAL at 05:10

## 2023-10-10 RX ADMIN — ACETAMINOPHEN 650 MG: 325 TABLET ORAL at 01:05

## 2023-10-10 RX ADMIN — NIMODIPINE 60 MG: 30 CAPSULE, LIQUID FILLED ORAL at 01:06

## 2023-10-10 RX ADMIN — ACETAMINOPHEN 650 MG: 325 TABLET ORAL at 09:48

## 2023-10-10 ASSESSMENT — PAIN SCALES - GENERAL
PAINLEVEL_OUTOF10: 7
PAINLEVEL_OUTOF10: 5 - MODERATE PAIN
PAINLEVEL_OUTOF10: 0 - NO PAIN
PAINLEVEL_OUTOF10: 5 - MODERATE PAIN

## 2023-10-10 ASSESSMENT — COGNITIVE AND FUNCTIONAL STATUS - GENERAL
DAILY ACTIVITIY SCORE: 24
MOBILITY SCORE: 24

## 2023-10-10 ASSESSMENT — PAIN - FUNCTIONAL ASSESSMENT
PAIN_FUNCTIONAL_ASSESSMENT: 0-10

## 2023-10-10 ASSESSMENT — ACTIVITIES OF DAILY LIVING (ADL): LACK_OF_TRANSPORTATION: NO

## 2023-10-10 ASSESSMENT — PAIN DESCRIPTION - DESCRIPTORS: DESCRIPTORS: ACHING

## 2023-10-10 NOTE — DISCHARGE SUMMARY
Discharge Diagnosis  Subarachnoid hemorrhage (CMS/HCC)    Issues Requiring Follow-Up  Establish PCP    Test Results Pending At Discharge  Pending Labs       Order Current Status    CBC Collected (10/08/23 0119)    CBC Collected (10/10/23 0804)    CBC Collected (10/10/23 0805)    Hepatic function panel Collected (10/10/23 0804)    Magnesium Collected (10/10/23 0804)    Extra Tubes In process    Sparks Top In process    Red Top In process    SST TOP In process    SST TOP In process            Hospital Course  37 year old female with h/o smoking, uncle  2/2 aneurysmal hemorrhage presented 10/1/23 with  2 days headache and nuchal rigidity, CTH basal/Sylvian SAH, CTA 6mm basilar tip aneurysm.  Patient was taken 10/2/23 for coil embolization of basilar tip aneurysm.  10/3/23 TTE EF 65%.  Angio. On 10/9 angio. Showed stable remnant.  Patient was discharged to home in satisfactory condition with follow up being arranged by Dr. Boyd's office.    .    Pertinent Physical Exam At Time of Discharge  Physical Exam  Gen: Alert, Ox3, in NAD;  HEENT: Normocephalic, atraumatic, EOMs intact;  CVS: RR, S1 S2, no m/r/g;  Pulm: Chest expansion symmetric;  Abd: Soft, NT/ND:  Ext: No edema  Neuro: A&Ox3, FC x 4; Angio. Inc. Intact;  Psych: Calm and cooperative;    Home Medications     Medication List      START taking these medications     acetaminophen 325 mg tablet; Commonly known as: Tylenol; Take 2 tablets   (650 mg) by mouth every 6 hours if needed for mild pain (1 - 3) or   headaches.       Outpatient Follow-Up  No future appointments.    Lynda Mittal, APRN-CNP

## 2023-10-10 NOTE — NURSING NOTE
Patient discharged home with no needs. Discharge instructions discussed with patient and family, verbalized understanding. Peripheral Ivs removed, intact. No prescriptions ordered. Belongings gathered by patient. Patient ambulated to [facility] at 0954 with family member.

## 2023-10-11 ENCOUNTER — DOCUMENTATION (OUTPATIENT)
Dept: NEUROSURGERY | Facility: HOSPITAL | Age: 38
End: 2023-10-11
Payer: COMMERCIAL

## 2023-10-11 DIAGNOSIS — M54.16 RADICULOPATHY OF LUMBAR REGION: Primary | ICD-10-CM

## 2023-10-11 NOTE — PROGRESS NOTES
Patient called requesting pain medication Rx as she is having severe lower back, buttock, and posterior leg pain. Rated 12/10, states could not sleep and cannot get relieve with Tylenol or position change. Reports it started while she was in the hospital and she was receiving narcotic pain meds for it. 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 Denies excessive swelling and femoral arteriotomy site, small quarter size lump and some ecchymosis.   I discussed with her that severe pain is unusual after a femoral puncture for angiogram. Given Hx of renal calculi and pyelonephritis reported in EMR and recent arterial puncture it is best to be evaluated in ED for source of pain.   [facility] report reviewed and noted several prior hydromorphone prescriptions in past.   I     Problem List Items Addressed This Visit    None         Assessment/Plan       All questions were answered to the patient's satisfaction.      This note was created in part after personal review of documents in EMR including recent labs and personally viewing available radiologic imaging. Total time spent in review, time spent with patient, and formulating plan and documentation is **.

## 2023-10-11 NOTE — ASSESSMENT & PLAN NOTE
Patient called requesting pain medication Rx as she is having severe lower back, buttock, and posterior leg pain. Rated 12/10, states could not sleep and cannot get relieve with Tylenol or position change. Reports it started while she was in the hospital and she was receiving narcotic pain meds for it. 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 Denies excessive swelling and femoral arteriotomy site, small quarter size lump and some ecchymosis.   I discussed with her that severe pain is unusual after a femoral puncture for angiogram. Given Hx of renal calculi and pyelonephritis reported in EMR and recent arterial puncture it is best to be evaluated in ED for source of pain.   OARRS report reviewed and noted several prior hydromorphone prescriptions in past.   I

## 2023-10-12 ENCOUNTER — TELEPHONE (OUTPATIENT)
Dept: NEUROSURGERY | Facility: HOSPITAL | Age: 38
End: 2023-10-12
Payer: COMMERCIAL

## 2023-10-12 DIAGNOSIS — I60.9 SUBARACHNOID HEMORRHAGE (MULTI): ICD-10-CM

## 2023-10-12 DIAGNOSIS — I67.1 CEREBRAL ANEURYSM (HHS-HCC): Primary | ICD-10-CM

## 2023-10-12 RX ORDER — METHYLPREDNISOLONE 4 MG/1
TABLET ORAL
Qty: 1 TABLET | Refills: 0 | Status: SHIPPED | OUTPATIENT
Start: 2023-10-12 | End: 2023-11-11

## 2023-10-12 NOTE — TELEPHONE ENCOUNTER
Called patient to discuss CV conference recs. Will place order to baseline MRA brain now and a surveillance MRA in 6 months.  She is experiencing lower back and hip pain, suspect radicular pain secondary to subarachnoid irritation for hemorrhage. Will send Rx to pharmacy for medrol dose pack.

## 2023-10-13 NOTE — DOCUMENTATION CLARIFICATION NOTE
"    PATIENT:               ALIA CHANG  ACCT #:                  [id number]  MRN:                       [id number]  :                       1985  ADMIT DATE:       10/1/2023 2:14 PM  DISCH DATE:        10/10/2023 9:52 AM  RESPONDING PROVIDER #:        [id number]          PROVIDER RESPONSE TEXT:    hypernatremia not requiring treatment or evaluation    CDI QUERY TEXT:    [facility]_Abnormal Studies      Instruction:    Based on your assessment of the patient and the clinical information, please provide the requested documentation by clicking on the appropriate radio button and enter any additional information if prompted.    Question: Is there a diagnosis indicative of the lab values or image study    When answering this query, please exercise your independent professional judgment. The fact that a question is being asked, does not imply that any particular answer is desired or expected.    The patient's clinical indicators include:  Clinical Information: 37-year-old female transferred from outside hospital chief concern of neck stiffness, headache..    Clinical Indicators: Labs: Sodium 10/1 147,  10/2 146    10/1 H&P by Dr. Brannon \"female presenting with 2 days HA and neck stiffness.\" \"CTH basal/Sylvian SAH, CTA 6mm basilar tip aneurysm.\"    Treatment: Monitoring Daily labs,    Risk Factors: SAH  Options provided:  -- hypernatremia  is clinically significant and required treatment/monitoring  -- hypernatremia not requiring treatment or evaluation  -- Other - I will add my own diagnosis  -- Refer to Clinical Documentation Reviewer    Query created by: Dolly Giles on 10/4/2023 6:38 AM      Electronically signed by:  LUI BRANNON MD 10/13/2023 9:10 AM          "

## 2023-11-02 ENCOUNTER — APPOINTMENT (OUTPATIENT)
Dept: NEUROSURGERY | Facility: CLINIC | Age: 38
End: 2023-11-02
Payer: COMMERCIAL

## 2023-11-06 ENCOUNTER — HOSPITAL ENCOUNTER (OUTPATIENT)
Dept: RADIOLOGY | Facility: HOSPITAL | Age: 38
Discharge: HOME | End: 2023-11-06
Payer: COMMERCIAL

## 2023-11-06 DIAGNOSIS — I60.9 SUBARACHNOID HEMORRHAGE (MULTI): ICD-10-CM

## 2023-11-06 DIAGNOSIS — I67.1 CEREBRAL ANEURYSM (HHS-HCC): ICD-10-CM

## 2023-11-06 PROCEDURE — 70546 MR ANGIOGRAPH HEAD W/O&W/DYE: CPT

## 2023-11-06 PROCEDURE — 2550000001 HC RX 255 CONTRASTS: Performed by: NURSE PRACTITIONER

## 2023-11-06 PROCEDURE — 70546 MR ANGIOGRAPH HEAD W/O&W/DYE: CPT | Performed by: RADIOLOGY

## 2023-11-06 PROCEDURE — A9575 INJ GADOTERATE MEGLUMI 0.1ML: HCPCS | Performed by: NURSE PRACTITIONER

## 2023-11-06 RX ORDER — GADOTERATE MEGLUMINE 376.9 MG/ML
0.2 INJECTION INTRAVENOUS
Status: COMPLETED | OUTPATIENT
Start: 2023-11-06 | End: 2023-11-06

## 2023-11-06 RX ADMIN — GADOTERATE MEGLUMINE 12 ML: 376.9 INJECTION INTRAVENOUS at 17:17

## 2023-11-09 ENCOUNTER — APPOINTMENT (OUTPATIENT)
Dept: NEUROSURGERY | Facility: CLINIC | Age: 38
End: 2023-11-09
Payer: COMMERCIAL

## 2023-11-10 NOTE — PROGRESS NOTES
Reshma Landers is a very nice 38-year-old woman here for neurosurgical follow-up for recent aneurysmal subarachnoid hemorrhage status post balloon-assisted coil embolization of basilar tip aneurysm on 10/2/2023.  She did remarkably well and was discharged home.  She is doing overall well with no significant complaints.  She remains neurologically intact on exam.  I personally reviewed follow-up MRA with and without contrast post coil protocol done on 11/6/2023.  This has poor bolus timing and motion artifact on the contrasted images but the time-of-flight sequences shows a small stable 2 mm base remnant without any filling of the dome of the aneurysm and widely patent parent vasculature.  I recommended continued observation of this base remnant with follow-up MRA post coil protocol in another 6 months and follow-up with me afterwards.  I reiterated that the risk of rupture of this base remnant is very unlikely, but not 0%.    Prep Time On Date of the Patient Encounter:    10 Minutes  Time Directly with Patient/Family/Caregiver:    15 Minutes  Additional Time Spent on Patient Care Activities:   0 Minutes  Documentation Time:       5 Minutes  Other Time Spent:       0 Minutes    Details of Other Time Spent:      Total Time on Date of Patient Encounter:    30 Minutes

## 2023-11-14 ENCOUNTER — APPOINTMENT (OUTPATIENT)
Dept: NEUROSURGERY | Facility: CLINIC | Age: 38
End: 2023-11-14
Payer: COMMERCIAL

## 2023-11-14 ENCOUNTER — OFFICE VISIT (OUTPATIENT)
Dept: NEUROSURGERY | Facility: CLINIC | Age: 38
End: 2023-11-14
Payer: COMMERCIAL

## 2023-11-14 VITALS
WEIGHT: 135 LBS | BODY MASS INDEX: 24.84 KG/M2 | SYSTOLIC BLOOD PRESSURE: 130 MMHG | DIASTOLIC BLOOD PRESSURE: 77 MMHG | HEART RATE: 84 BPM | HEIGHT: 62 IN | TEMPERATURE: 96.3 F | RESPIRATION RATE: 16 BRPM

## 2023-11-14 DIAGNOSIS — Z86.79 HISTORY OF NONTRAUMATIC RUPTURE OF CEREBRAL ANEURYSM: Primary | ICD-10-CM

## 2023-11-14 DIAGNOSIS — I72.5 BASILAR ARTERY ANEURYSM (CMS-HCC): ICD-10-CM

## 2023-11-14 PROCEDURE — 99214 OFFICE O/P EST MOD 30 MIN: CPT | Performed by: NEUROLOGICAL SURGERY

## 2023-11-14 ASSESSMENT — PAIN SCALES - GENERAL: PAINLEVEL: 0-NO PAIN

## 2024-04-01 ENCOUNTER — HOSPITAL ENCOUNTER (OUTPATIENT)
Dept: RADIOLOGY | Facility: HOSPITAL | Age: 39
Discharge: HOME | End: 2024-04-01
Payer: COMMERCIAL

## 2024-04-01 DIAGNOSIS — I67.1 CEREBRAL ANEURYSM (HHS-HCC): ICD-10-CM

## 2024-04-01 PROCEDURE — 70547 MR ANGIOGRAPHY NECK W/O DYE: CPT | Performed by: RADIOLOGY

## 2024-04-01 PROCEDURE — A9575 INJ GADOTERATE MEGLUMI 0.1ML: HCPCS | Performed by: NEUROLOGICAL SURGERY

## 2024-04-01 PROCEDURE — 2550000001 HC RX 255 CONTRASTS: Performed by: NEUROLOGICAL SURGERY

## 2024-04-01 PROCEDURE — 70546 MR ANGIOGRAPH HEAD W/O&W/DYE: CPT | Performed by: RADIOLOGY

## 2024-04-01 PROCEDURE — 70547 MR ANGIOGRAPHY NECK W/O DYE: CPT

## 2024-04-01 PROCEDURE — 70546 MR ANGIOGRAPH HEAD W/O&W/DYE: CPT

## 2024-04-01 RX ORDER — GADOTERATE MEGLUMINE 376.9 MG/ML
12 INJECTION INTRAVENOUS
Status: COMPLETED | OUTPATIENT
Start: 2024-04-01 | End: 2024-04-01

## 2024-04-01 RX ADMIN — GADOTERATE MEGLUMINE 12 ML: 376.9 INJECTION INTRAVENOUS at 16:15

## 2024-04-19 PROBLEM — N83.8 MASS OF RIGHT OVARY: Status: ACTIVE | Noted: 2024-04-19

## 2024-04-19 PROBLEM — R42 DIZZINESS AND GIDDINESS: Status: ACTIVE | Noted: 2023-10-01

## 2024-04-19 PROBLEM — J02.9 ACUTE PHARYNGITIS: Status: ACTIVE | Noted: 2024-04-19

## 2024-04-19 PROBLEM — N80.00 ENDOMETRIOSIS OF UTERUS: Status: ACTIVE | Noted: 2024-04-19

## 2024-04-19 PROBLEM — I72.5 ANEURYSM OF BASILAR ARTERY (CMS-HCC): Status: ACTIVE | Noted: 2024-04-19

## 2024-04-19 PROBLEM — R10.9 ABDOMINAL PAIN: Status: ACTIVE | Noted: 2023-07-06

## 2024-04-19 PROBLEM — N93.0 POSTCOITAL AND CONTACT BLEEDING: Status: ACTIVE | Noted: 2022-10-20

## 2024-04-19 PROBLEM — R03.0 FINDING OF ABOVE NORMAL BLOOD PRESSURE: Status: ACTIVE | Noted: 2024-04-19

## 2024-04-19 PROBLEM — N80.9 ENDOMETRIOSIS: Status: ACTIVE | Noted: 2024-04-19

## 2024-04-19 PROBLEM — N39.0 URINARY TRACT INFECTION: Status: ACTIVE | Noted: 2023-05-30

## 2024-04-19 PROBLEM — H53.149 VISUAL DISCOMFORT: Status: ACTIVE | Noted: 2023-10-01

## 2024-04-19 PROBLEM — N60.09 CYST OF BREAST: Status: ACTIVE | Noted: 2017-02-20

## 2024-04-19 PROBLEM — R52 GENERALIZED PAIN: Status: ACTIVE | Noted: 2024-04-19

## 2024-04-19 PROBLEM — G89.29 CHRONIC LOW BACK PAIN: Status: ACTIVE | Noted: 2024-04-19

## 2024-04-19 PROBLEM — E28.2 POLYCYSTIC OVARY SYNDROME: Status: ACTIVE | Noted: 2024-04-19

## 2024-04-19 PROBLEM — M54.50 CHRONIC LOW BACK PAIN: Status: ACTIVE | Noted: 2024-04-19

## 2024-04-19 PROBLEM — M65.4 RADIAL STYLOID TENOSYNOVITIS OF LEFT HAND: Status: ACTIVE | Noted: 2024-04-19

## 2024-04-19 PROBLEM — N83.209 HEMORRHAGIC CYST OF OVARY: Status: ACTIVE | Noted: 2024-04-19

## 2024-04-19 PROBLEM — Z86.79 HISTORY OF NONTRAUMATIC RUPTURE OF CEREBRAL ANEURYSM: Status: ACTIVE | Noted: 2024-04-19

## 2024-04-19 PROBLEM — R30.0 DYSURIA: Status: ACTIVE | Noted: 2024-01-25

## 2024-04-19 PROBLEM — R10.31 RIGHT LOWER QUADRANT PAIN: Status: ACTIVE | Noted: 2024-04-19

## 2024-04-19 PROBLEM — N20.0 CALCULUS OF KIDNEY: Status: ACTIVE | Noted: 2023-05-30

## 2024-04-19 PROBLEM — R51.9 HEADACHE: Status: ACTIVE | Noted: 2023-10-01

## 2024-04-19 RX ORDER — NITROFURANTOIN 25; 75 MG/1; MG/1
CAPSULE ORAL
COMMUNITY

## 2024-04-19 RX ORDER — IBUPROFEN 200 MG
1 TABLET ORAL
COMMUNITY
Start: 2023-10-04

## 2024-04-19 RX ORDER — PHENAZOPYRIDINE HYDROCHLORIDE 200 MG/1
TABLET, FILM COATED ORAL
COMMUNITY
Start: 2023-07-05

## 2024-04-19 RX ORDER — NEOMYCIN SULFATE, POLYMYXIN B SULFATE AND GRAMICIDIN 1.75; 10000; .025 MG/ML; [USP'U]/ML; MG/ML
SOLUTION/ DROPS OPHTHALMIC
COMMUNITY
Start: 2012-05-29

## 2024-04-19 RX ORDER — HYDROCODONE BITARTRATE AND ACETAMINOPHEN 5; 325 MG/1; MG/1
TABLET ORAL
COMMUNITY
Start: 2014-03-06

## 2024-04-19 RX ORDER — ACETAMINOPHEN AND CODEINE PHOSPHATE 300; 30 MG/1; MG/1
TABLET ORAL
COMMUNITY
Start: 2014-10-30

## 2024-04-19 RX ORDER — ONDANSETRON 4 MG/1
4 TABLET, ORALLY DISINTEGRATING ORAL
COMMUNITY
Start: 2023-07-06

## 2024-04-19 RX ORDER — CEPHALEXIN 500 MG/1
CAPSULE ORAL
COMMUNITY
Start: 2023-07-05

## 2024-04-19 RX ORDER — ELAGOLIX 200 MG/1
TABLET, FILM COATED ORAL
COMMUNITY
Start: 2023-09-09

## 2024-04-19 RX ORDER — METFORMIN HYDROCHLORIDE 500 MG/1
TABLET, EXTENDED RELEASE ORAL
COMMUNITY
Start: 2012-03-22

## 2024-04-19 RX ORDER — HYDROMORPHONE HYDROCHLORIDE 4 MG/1
TABLET ORAL
COMMUNITY

## 2024-05-17 NOTE — PROGRESS NOTES
Reshma Landers is a very nice 38 year old woman with history of aneurysmal subarachnoid hemorrhage status post balloon-assisted coil embolization of basilar tip aneurysm on 10/2/2023. Here today for 6 month follow up and review of MRA for surveillance of aneurysm base remnant. MRA done 4/1/2024. She is overall doing very well without any significant neurologic complaints. She is back to work full time.  She is neurologically intact on exam.  I personally reviewed MRI of the head with and without gadolinium post coil protocol done on 4/1/2024 and compared to prior MRI which shows a based remnant of the coiled basilar tip aneurysm measuring 3 mm wide by 1.5 mm tall on coronal sequence.  This does look slightly more prominent than previous scan.  I recommended diagnostic cerebral angiogram to further evaluate this. I discussed the procedure and risks of cerebral angiography which includes but not limited to small risk of stroke, hemorrhage, vessel injury, hematoma (retroperitoneal or femoral), allergic reaction of contrast dye, and contrast-induced nephropathy.     Oren Boyd MD    Prep Time On Date of the Patient Encounter:    10 Minutes  Time Directly with Patient/Family/Caregiver:    15 Minutes  Additional Time Spent on Patient Care Activities:   0 Minutes  Documentation Time:       5 Minutes  Other Time Spent:       0 Minutes    Details of Other Time Spent:      Total Time on Date of Patient Encounter:    30 Minutes

## 2024-05-21 ENCOUNTER — OFFICE VISIT (OUTPATIENT)
Dept: NEUROSURGERY | Facility: CLINIC | Age: 39
End: 2024-05-21
Payer: COMMERCIAL

## 2024-05-21 VITALS — BODY MASS INDEX: 24.84 KG/M2 | HEIGHT: 62 IN | TEMPERATURE: 98.4 F | WEIGHT: 135 LBS

## 2024-05-21 DIAGNOSIS — I67.1 CEREBRAL ANEURYSM (HHS-HCC): Primary | ICD-10-CM

## 2024-05-21 PROCEDURE — 99214 OFFICE O/P EST MOD 30 MIN: CPT | Performed by: NEUROLOGICAL SURGERY

## 2024-05-21 ASSESSMENT — PAIN SCALES - GENERAL: PAINLEVEL: 0-NO PAIN

## 2024-06-17 DIAGNOSIS — I60.9 SUBARACHNOID HEMORRHAGE (MULTI): Primary | ICD-10-CM

## 2024-06-17 DIAGNOSIS — I67.1 CEREBRAL ANEURYSM (HHS-HCC): ICD-10-CM

## 2024-06-19 ENCOUNTER — LAB (OUTPATIENT)
Dept: LAB | Facility: LAB | Age: 39
End: 2024-06-19
Payer: COMMERCIAL

## 2024-06-19 DIAGNOSIS — I60.9 SUBARACHNOID HEMORRHAGE (MULTI): ICD-10-CM

## 2024-06-19 LAB
ANION GAP SERPL CALC-SCNC: 11 MMOL/L (ref 10–20)
BUN SERPL-MCNC: 10 MG/DL (ref 6–23)
CALCIUM SERPL-MCNC: 8.7 MG/DL (ref 8.6–10.3)
CHLORIDE SERPL-SCNC: 105 MMOL/L (ref 98–107)
CO2 SERPL-SCNC: 26 MMOL/L (ref 21–32)
CREAT SERPL-MCNC: 0.72 MG/DL (ref 0.5–1.05)
EGFRCR SERPLBLD CKD-EPI 2021: >90 ML/MIN/1.73M*2
ERYTHROCYTE [DISTWIDTH] IN BLOOD BY AUTOMATED COUNT: 13.2 % (ref 11.5–14.5)
GLUCOSE SERPL-MCNC: 84 MG/DL (ref 74–99)
HCT VFR BLD AUTO: 44.6 % (ref 36–46)
HGB BLD-MCNC: 15 G/DL (ref 12–16)
INR PPP: 1.1 (ref 0.9–1.1)
MCH RBC QN AUTO: 29.6 PG (ref 26–34)
MCHC RBC AUTO-ENTMCNC: 33.6 G/DL (ref 32–36)
MCV RBC AUTO: 88 FL (ref 80–100)
NRBC BLD-RTO: 0 /100 WBCS (ref 0–0)
PLATELET # BLD AUTO: 315 X10*3/UL (ref 150–450)
POTASSIUM SERPL-SCNC: 4.2 MMOL/L (ref 3.5–5.3)
PROTHROMBIN TIME: 12.2 SECONDS (ref 9.8–12.8)
RBC # BLD AUTO: 5.07 X10*6/UL (ref 4–5.2)
SODIUM SERPL-SCNC: 138 MMOL/L (ref 136–145)
WBC # BLD AUTO: 9.1 X10*3/UL (ref 4.4–11.3)

## 2024-06-19 PROCEDURE — 85027 COMPLETE CBC AUTOMATED: CPT

## 2024-06-19 PROCEDURE — 36415 COLL VENOUS BLD VENIPUNCTURE: CPT

## 2024-06-19 PROCEDURE — 85610 PROTHROMBIN TIME: CPT

## 2024-06-19 PROCEDURE — 80048 BASIC METABOLIC PNL TOTAL CA: CPT

## 2024-06-24 ENCOUNTER — HOSPITAL ENCOUNTER (OUTPATIENT)
Dept: RADIOLOGY | Facility: HOSPITAL | Age: 39
Discharge: HOME | End: 2024-06-24
Payer: COMMERCIAL

## 2024-06-24 VITALS
SYSTOLIC BLOOD PRESSURE: 139 MMHG | DIASTOLIC BLOOD PRESSURE: 89 MMHG | HEART RATE: 61 BPM | TEMPERATURE: 97.2 F | RESPIRATION RATE: 18 BRPM | OXYGEN SATURATION: 97 %

## 2024-06-24 DIAGNOSIS — I67.1 CEREBRAL ANEURYSM (HHS-HCC): ICD-10-CM

## 2024-06-24 PROCEDURE — 2780000003 HC OR 278 NO HCPCS: Performed by: NEUROLOGICAL SURGERY

## 2024-06-24 PROCEDURE — 99152 MOD SED SAME PHYS/QHP 5/>YRS: CPT | Performed by: NEUROLOGICAL SURGERY

## 2024-06-24 PROCEDURE — 7100000009 HC PHASE TWO TIME - INITIAL BASE CHARGE: Performed by: NEUROLOGICAL SURGERY

## 2024-06-24 PROCEDURE — 75710 ARTERY X-RAYS ARM/LEG: CPT | Mod: RT | Performed by: NEUROLOGICAL SURGERY

## 2024-06-24 PROCEDURE — C1769 GUIDE WIRE: HCPCS | Performed by: NEUROLOGICAL SURGERY

## 2024-06-24 PROCEDURE — C1887 CATHETER, GUIDING: HCPCS | Performed by: NEUROLOGICAL SURGERY

## 2024-06-24 PROCEDURE — 76377 3D RENDER W/INTRP POSTPROCES: CPT | Performed by: NEUROLOGICAL SURGERY

## 2024-06-24 PROCEDURE — 2550000001 HC RX 255 CONTRASTS: Performed by: NEUROLOGICAL SURGERY

## 2024-06-24 PROCEDURE — 99153 MOD SED SAME PHYS/QHP EA: CPT | Performed by: NEUROLOGICAL SURGERY

## 2024-06-24 PROCEDURE — 36226 PLACE CATH VERTEBRAL ART: CPT | Performed by: NEUROLOGICAL SURGERY

## 2024-06-24 PROCEDURE — C1760 CLOSURE DEV, VASC: HCPCS | Performed by: NEUROLOGICAL SURGERY

## 2024-06-24 PROCEDURE — 7100000010 HC PHASE TWO TIME - EACH INCREMENTAL 1 MINUTE: Performed by: NEUROLOGICAL SURGERY

## 2024-06-24 PROCEDURE — 2500000004 HC RX 250 GENERAL PHARMACY W/ HCPCS (ALT 636 FOR OP/ED): Performed by: NEUROLOGICAL SURGERY

## 2024-06-24 PROCEDURE — 2720000007 HC OR 272 NO HCPCS: Performed by: NEUROLOGICAL SURGERY

## 2024-06-24 RX ORDER — FENTANYL CITRATE 50 UG/ML
INJECTION, SOLUTION INTRAMUSCULAR; INTRAVENOUS
Status: COMPLETED | OUTPATIENT
Start: 2024-06-24 | End: 2024-06-24

## 2024-06-24 RX ORDER — MIDAZOLAM HYDROCHLORIDE 1 MG/ML
INJECTION INTRAMUSCULAR; INTRAVENOUS
Status: COMPLETED | OUTPATIENT
Start: 2024-06-24 | End: 2024-06-24

## 2024-06-24 ASSESSMENT — PAIN SCALES - GENERAL

## 2024-06-24 ASSESSMENT — PAIN - FUNCTIONAL ASSESSMENT
PAIN_FUNCTIONAL_ASSESSMENT: 0-10

## 2024-06-24 NOTE — PROCEDURES
Pre-Procedure Verification and Time Out:  Procedure Location: procedure area  HUDDLE - Pre-procedure Verification:  completed  TIME OUT - Final Verification:  completed immediately prior to procedure start  DEBRIEF: completed    Complications:  None; patient tolerated the procedure well.     Disposition: rPCU  Condition: stable  Specimens Collected: No specimens collected    General Information:   Anesthesia/ sedation: Non-Anesthesia  Indication(s)/Pre - Procedure Diagnoses: cerebral aneurysm s/p coiling  Post-Procedure Diagnosis: same  Procedure Name: Diagnostic Cerebral Angiogram  Procedure performed by: Dr. Oren Boyd  Assistant(s): Dori  Estimated Blood Loss (mL): 10  Specimen: no  Informed Consent: consent obtained and in chart    Access: 5 Fr Sheath in R CFA  Closure: Mynx  Vessels Injected: L vert + 3d, R CFA  Moderate Sedation Time: 30 minutes  Findings: Basilar tip aneurysm with increased neck remnant from previous. Full report to follow in PACS dictation

## 2024-06-24 NOTE — PRE-PROCEDURE NOTE
Pre-Procedure H&P     Provider Assessment:  Diagnosis/Reason for Procedure: cerebral aneurysm  Procedure: Diagnostic Cerebral Angiogram  Medications Reviewed:   yes   Prophylatic Antibiotics Needed:   no    Neuro status: A&Ox3, moving all extremities full strength   Mouth Opening OK: yes   Neck Flexibility OK: yes   Sedation Plan: moderate sedation   COVID-19 Risk Consent:  Surgeon has reviewed key risks related to the risk of josé manuel COVID-19 and if they contract COVID-19 what the risks are.

## 2024-06-24 NOTE — DISCHARGE INSTRUCTIONS
Okay to remove dressing and shower on 6/25. Do not submerge the incision in a pool or bath until completely healed, 5-7 days. Do not drive for 48 hours. Have a responsible adult with you for the next 24 hours. Normal activity is unrestricted, no exertional activity or heavy lifting greater than 20 pounds for 7 days. Call your doctor with any heavy bleeding or swelling from the site, weakness or numbness in the extremity, bloody or dark urine.     You received moderate sedation:  - Do not drive a car, or operate any machinery or power tools of any kind.  - Do not drink any alcoholic drinks.  - Do not take any over the counter medications that may cause drowsiness.  - Do not make any important decisions or sign any legal documents.  - You need to have a responsible adult accompany you home.  - You may resume your normal diet.  - We strongly suggest that a responsible adult be with you for the rest of the day and also during the night. This is for your protection and safety.     For questions related to your procedure:  Please call [phone] between the hours of 7:00am-5:00pm Monday through Friday.  Please call [phone] after 5:00pm and on weekends and holidays.     In the event of an emergency call 911 or go to your nearest emergency room.

## 2024-07-18 DIAGNOSIS — I67.1 CEREBRAL ANEURYSM (HHS-HCC): ICD-10-CM

## 2024-07-18 RX ORDER — CLOPIDOGREL BISULFATE 75 MG/1
75 TABLET ORAL DAILY
Qty: 90 TABLET | Refills: 3 | Status: SHIPPED | OUTPATIENT
Start: 2024-07-18 | End: 2025-07-18

## 2024-07-22 DIAGNOSIS — I67.1 CEREBRAL ANEURYSM (HHS-HCC): Primary | ICD-10-CM

## 2024-08-05 ENCOUNTER — CLINICAL SUPPORT (OUTPATIENT)
Dept: PREADMISSION TESTING | Facility: HOSPITAL | Age: 39
End: 2024-08-05
Payer: COMMERCIAL

## 2024-08-05 NOTE — CPM/PAT NURSE NOTE
CPM/PAT Nurse Note      Name: Reshma Landers (Beth Mayle)  /Age: 1985/38 y.o.       Past Medical History:   Diagnosis Date    Aneurysmal subarachnoid hemorrhage (Multi) 10/01/2023    s/p  balloon-assisted coil embolization of basilar tip aneurysm on 10/2/2023    Cerebral aneurysm ([facility])        Past Surgical History:   Procedure Laterality Date    IR ANGIOGRAM CEREBRAL BILATERAL Bilateral 10/09/2023    IR ANGIOGRAM CEREBRAL BILATERAL 10/9/2023 [facility] ANGIO    KIDNEY SURGERY      states a surgery at 10 years old    MR HEAD ANGIO W AND WO IV CONTRAST  2023    MR HEAD ANGIO W AND WO IV CONTRAST 2023 [facility] MRI       Patient  has no history on file for sexual activity.    No family history on file.    Allergies   Allergen Reactions    Bactrim [Sulfamethoxazole-Trimethoprim] Swelling    Venom-Wasp Hives       Prior to Admission medications    Medication Sig Start Date End Date Taking? Authorizing Provider   acetaminophen (Tylenol) 325 mg tablet Take 2 tablets (650 mg) by mouth every 6 hours if needed for mild pain (1 - 3) or headaches.  Patient not taking: Reported on 2024 10/10/23   Lynda Mittal, APRN-CNP   acetaminophen-codeine (Tylenol w/ Codeine #3) 300-30 mg tablet Take 1 tablet every 6 hours by oral route. 10/30/14   Historical Provider, MD   cephalexin (Keflex) 500 mg capsule  23   Historical Provider, MD   clopidogrel (Plavix) 75 mg tablet Take 1 tablet (75 mg) by mouth once daily. Please START on 8/15/2024 for Angiogram Procedure 24  Oren Boyd MD   Dilaudid 4 mg tablet Take 1 tablet 3 times a day by oral route as needed.    Historical Provider, MD   HYDROcodone-acetaminophen (Norco) 5-325 mg tablet 1 PO TID PRN 3/6/14   Historical Provider, MD   metFORMIN  mg 24 hr tablet Take 2 tablets every day by oral route in the evening. 3/22/12   Historical Provider, MD   neomycin-polymyxin-gramicidin (Neosporin) 1.75 mg-10,000 unit-0.025mg/mL ophthalmic  solution 2 GTTS QID AFFECTED EYE 5/29/12   Historical Provider, MD   nicotine (Nicoderm CQ) 14 mg/24 hr patch Place 1 patch on the skin once daily. 10/4/23   Historical Provider, MD   nitrofurantoin, macrocrystal-monohydrate, (Macrobid) 100 mg capsule Take 1 capsule every 12 hours by oral route.    Historical Provider, MD   ondansetron ODT (Zofran-ODT) 4 mg disintegrating tablet Take 1 tablet (4 mg) by mouth. 7/6/23   Historical Provider, MD   Orilissa tablet TAKE 1 TABLET BY MOUTH TWICE DAILY AT APPROXIMATELY THE SAME TIME DAILY. 9/9/23   Historical Provider, MD   phenazopyridine (Pyridium) 200 mg tablet  7/5/23   Historical Provider, MD        PAT ROS     DASI Risk Score    No data to display       Caprini DVT Assessment    No data to display       Modified Frailty Index    No data to display       CHADS2 Stroke Risk  Current as of today        N/A 3 to 100%: High Risk   2 to < 3%: Medium Risk   0 to < 2%: Low Risk     Last Change: N/A          This score determines the patient's risk of having a stroke if the patient has atrial fibrillation.        This score is not applicable to this patient. Components are not calculated.          Revised Cardiac Risk Index    No data to display       Apfel Simplified Score    No data to display       Risk Analysis Index Results This Encounter    No data found in the last 1 encounters.       Scheduled for IR intervention on 08/26/24.  Not established with a PCP at this  time and states the only provider she is currently seeing is Dr. Boyd. Patient is not on any medications at this time.     Neurosurgery: Oren Boyd MD LOV 5/21/24  -IR Intervention Neuro Stent: 6/24/24  IMPRESSION:  Previously treated basilar tip aneurysm with interval increase in  neck remnant filling now measuring approximately 3.0 x 2.6 mm.    -MR Angio Head W and WO IV Contrast  IMPRESSION:  Postcontrast images are severely limited secondary to delayed  contrast bolus timing.      Time-of-flight images  demonstrate residual filling within the base of  the coiled basilar tip aneurysm measuring approximately 2 mm.      Otherwise, intracranial vasculature is within normal limits.  Nurse Plan of Action:    ONLY    Roint Rodríguez RN  Pre-Admission Testing

## 2024-08-15 ENCOUNTER — PRE-ADMISSION TESTING (OUTPATIENT)
Dept: PREADMISSION TESTING | Facility: HOSPITAL | Age: 39
End: 2024-08-15
Payer: COMMERCIAL

## 2024-08-15 VITALS
BODY MASS INDEX: 23.97 KG/M2 | SYSTOLIC BLOOD PRESSURE: 126 MMHG | HEIGHT: 63 IN | OXYGEN SATURATION: 97 % | TEMPERATURE: 97.7 F | WEIGHT: 135.3 LBS | DIASTOLIC BLOOD PRESSURE: 84 MMHG | HEART RATE: 76 BPM

## 2024-08-15 DIAGNOSIS — I67.1 CEREBRAL ANEURYSM (HHS-HCC): ICD-10-CM

## 2024-08-15 DIAGNOSIS — Z01.818 PREOPERATIVE EXAMINATION: Primary | ICD-10-CM

## 2024-08-15 LAB
ABO GROUP (TYPE) IN BLOOD: NORMAL
ANION GAP SERPL CALC-SCNC: 15 MMOL/L (ref 10–20)
ANTIBODY SCREEN: NORMAL
BUN SERPL-MCNC: 13 MG/DL (ref 6–23)
CALCIUM SERPL-MCNC: 9.3 MG/DL (ref 8.6–10.6)
CHLORIDE SERPL-SCNC: 100 MMOL/L (ref 98–107)
CO2 SERPL-SCNC: 27 MMOL/L (ref 21–32)
CREAT SERPL-MCNC: 0.63 MG/DL (ref 0.5–1.05)
EGFRCR SERPLBLD CKD-EPI 2021: >90 ML/MIN/1.73M*2
ERYTHROCYTE [DISTWIDTH] IN BLOOD BY AUTOMATED COUNT: 13 % (ref 11.5–14.5)
GLUCOSE SERPL-MCNC: 74 MG/DL (ref 74–99)
HCT VFR BLD AUTO: 45 % (ref 36–46)
HGB BLD-MCNC: 14.9 G/DL (ref 12–16)
MCH RBC QN AUTO: 30 PG (ref 26–34)
MCHC RBC AUTO-ENTMCNC: 33.1 G/DL (ref 32–36)
MCV RBC AUTO: 91 FL (ref 80–100)
NRBC BLD-RTO: 0 /100 WBCS (ref 0–0)
PLATELET # BLD AUTO: 335 X10*3/UL (ref 150–450)
POTASSIUM SERPL-SCNC: 4.3 MMOL/L (ref 3.5–5.3)
RBC # BLD AUTO: 4.96 X10*6/UL (ref 4–5.2)
RH FACTOR (ANTIGEN D): NORMAL
SODIUM SERPL-SCNC: 138 MMOL/L (ref 136–145)
WBC # BLD AUTO: 14.6 X10*3/UL (ref 4.4–11.3)

## 2024-08-15 PROCEDURE — 86901 BLOOD TYPING SEROLOGIC RH(D): CPT

## 2024-08-15 PROCEDURE — 80048 BASIC METABOLIC PNL TOTAL CA: CPT

## 2024-08-15 PROCEDURE — 36415 COLL VENOUS BLD VENIPUNCTURE: CPT

## 2024-08-15 PROCEDURE — 99204 OFFICE O/P NEW MOD 45 MIN: CPT | Performed by: NURSE PRACTITIONER

## 2024-08-15 PROCEDURE — 87081 CULTURE SCREEN ONLY: CPT

## 2024-08-15 PROCEDURE — 85027 COMPLETE CBC AUTOMATED: CPT

## 2024-08-15 PROCEDURE — 99406 BEHAV CHNG SMOKING 3-10 MIN: CPT | Performed by: NURSE PRACTITIONER

## 2024-08-15 RX ORDER — CHLORHEXIDINE GLUCONATE 40 MG/ML
SOLUTION TOPICAL 2 TIMES DAILY
Qty: 473 ML | Refills: 0 | Status: SHIPPED | OUTPATIENT
Start: 2024-08-15 | End: 2024-08-20

## 2024-08-15 RX ORDER — CHLORHEXIDINE GLUCONATE ORAL RINSE 1.2 MG/ML
SOLUTION DENTAL
Qty: 473 ML | Refills: 0 | Status: SHIPPED | OUTPATIENT
Start: 2024-08-15

## 2024-08-15 ASSESSMENT — DUKE ACTIVITY SCORE INDEX (DASI)
TOTAL_SCORE: 58.2
CAN YOU HAVE SEXUAL RELATIONS: YES
CAN YOU DO YARD WORK LIKE RAKING LEAVES, WEEDING OR PUSHING A MOWER: YES
CAN YOU PARTICIPATE IN STRENOUS SPORTS LIKE SWIMMING, SINGLES TENNIS, FOOTBALL, BASKETBALL, OR SKIING: YES
CAN YOU DO MODERATE WORK AROUND THE HOUSE LIKE VACUUMING, SWEEPING FLOORS OR CARRYING GROCERIES: YES
CAN YOU DO LIGHT WORK AROUND THE HOUSE LIKE DUSTING OR WASHING DISHES: YES
CAN YOU CLIMB A FLIGHT OF STAIRS OR WALK UP A HILL: YES
CAN YOU TAKE CARE OF YOURSELF (EAT, DRESS, BATHE, OR USE TOILET): YES
CAN YOU WALK A BLOCK OR TWO ON LEVEL GROUND: YES
CAN YOU PARTICIPATE IN MODERATE RECREATIONAL ACTIVITIES LIKE GOLF, BOWLING, DANCING, DOUBLES TENNIS OR THROWING A BASEBALL OR FOOTBALL: YES
CAN YOU DO HEAVY WORK AROUND THE HOUSE LIKE SCRUBBING FLOORS OR LIFTING AND MOVING HEAVY FURNITURE: YES
CAN YOU WALK INDOORS, SUCH AS AROUND YOUR HOUSE: YES
CAN YOU RUN A SHORT DISTANCE: YES
DASI METS SCORE: 9.9

## 2024-08-15 ASSESSMENT — ENCOUNTER SYMPTOMS
GASTROINTESTINAL NEGATIVE: 1
NEUROLOGICAL NEGATIVE: 1
EYES NEGATIVE: 1
CARDIOVASCULAR NEGATIVE: 1
ENDOCRINE NEGATIVE: 1
CONSTITUTIONAL NEGATIVE: 1
NECK NEGATIVE: 1
MUSCULOSKELETAL NEGATIVE: 1
RESPIRATORY NEGATIVE: 1

## 2024-08-15 ASSESSMENT — LIFESTYLE VARIABLES: SMOKING_STATUS: SMOKER

## 2024-08-15 NOTE — CPM/PAT H&P
CPM/PAT Evaluation       Name: Reshma Landers (Beth Mayle)  /Age: 1985/38 y.o.     Visit Type:   In-Person       Chief Complaint: Cerebral aneurysm scheduled for surgery    HPI: Patient is a 38-year-old female scheduled for interventional radiology neurosurgery stent placement on 2024 for treatment of cerebral aneurysm.  The patient is referred by Dr. 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    Past Medical History:   Diagnosis Date    Aneurysmal subarachnoid hemorrhage ([facility]) 10/01/2023    s/p  balloon-assisted coil embolization of basilar tip aneurysm on 10/2/2023    Cerebral aneurysm ([facility])     Nephrolithiasis     PCOS (polycystic ovarian syndrome)        Past Surgical History:   Procedure Laterality Date    IR ANGIOGRAM CEREBRAL BILATERAL Bilateral 10/09/2023    IR ANGIOGRAM CEREBRAL BILATERAL 10/9/2023 Yola Rutherford [facility] [place] ANGIO    KIDNEY SURGERY      states a surgery at 10 years old    MR HEAD ANGIO W AND WO IV CONTRAST  2023    MR HEAD ANGIO W AND WO IV CONTRAST 2023 POR MRI       Patient  has no history on file for sexual activity.    Family History   Problem Relation Name Age of Onset    Heart attack Mother      Hypertension Father         Allergies   Allergen Reactions    Bactrim [Sulfamethoxazole-Trimethoprim] Swelling    Venom-Wasp Hives       Prior to Admission medications    Medication Sig Start Date End Date Taking? Authorizing Provider   clopidogrel (Plavix) 75 mg tablet Take 1 tablet (75 mg) by mouth once daily. Please START on 8/15/2024 for Angiogram Procedure 24 Yes Oren Boyd MD   acetaminophen (Tylenol) 325 mg tablet Take 2 tablets (650 mg) by mouth every 6 hours if needed for mild pain (1 - 3) or headaches. 10/10/23   Lynda Mittal, APRN-CNP   chlorhexidine (Hibiclens) 4 %  external liquid Apply topically 2 times a day for 5 days. 8/15/24 8/20/24  Samantha A Meeson, APRN-CNP   chlorhexidine (Peridex) 0.12 % solution Swish and spit 15 mL night before surgery and morning of surgery 8/15/24   Samantha A Meeson, APRN-CNP   acetaminophen-codeine (Tylenol w/ Codeine #3) 300-30 mg tablet Take 1 tablet every 6 hours by oral route. 10/30/14 8/15/24  Historical Provider, MD   cephalexin (Keflex) 500 mg capsule  7/5/23 8/15/24  Historical Provider, MD   Dilaudid 4 mg tablet Take 1 tablet 3 times a day by oral route as needed.  8/15/24  Historical Provider, MD   HYDROcodone-acetaminophen (Norco) 5-325 mg tablet 1 PO TID PRN 3/6/14 8/15/24  Historical Provider, MD   metFORMIN  mg 24 hr tablet Take 2 tablets every day by oral route in the evening. 3/22/12 8/15/24  Historical Provider, MD   neomycin-polymyxin-gramicidin (Neosporin) 1.75 mg-10,000 unit-0.025mg/mL ophthalmic solution 2 GTTS QID AFFECTED EYE 5/29/12 8/15/24  Historical Provider, MD   nicotine (Nicoderm CQ) 14 mg/24 hr patch Place 1 patch on the skin once daily. 10/4/23 8/15/24  Historical Provider, MD   nitrofurantoin, macrocrystal-monohydrate, (Macrobid) 100 mg capsule Take 1 capsule every 12 hours by oral route.  8/15/24  Historical Provider, MD   ondansetron ODT (Zofran-ODT) 4 mg disintegrating tablet Take 1 tablet (4 mg) by mouth. 7/6/23 8/15/24  Historical Provider, MD   Orilissa tablet TAKE 1 TABLET BY MOUTH TWICE DAILY AT APPROXIMATELY THE SAME TIME DAILY. 9/9/23 8/15/24  Historical Provider, MD   phenazopyridine (Pyridium) 200 mg tablet  7/5/23 8/15/24  Historical Provider, MD        PAT ROS:   Constitutional:   neg    Neuro/Psych:   neg    Eyes:   neg    Ears:   neg    Nose:   neg    Mouth:   neg    Throat:   neg    Neck:   neg    Cardio:   neg    Respiratory:   neg    Endocrine:   neg    GI:   neg    :   neg    Musculoskeletal:   neg    Hematologic:   neg    Skin:  neg        Physical Exam  Vitals reviewed.    Constitutional:       Appearance: Normal appearance.   HENT:      Head: Normocephalic.      Mouth/Throat:      Mouth: Mucous membranes are moist.   Eyes:      Conjunctiva/sclera: Conjunctivae normal.   Neck:      Vascular: No carotid bruit.   Cardiovascular:      Rate and Rhythm: Normal rate and regular rhythm.      Pulses: Normal pulses.      Heart sounds: Normal heart sounds.   Pulmonary:      Effort: Pulmonary effort is normal.      Breath sounds: Normal breath sounds.   Abdominal:      Palpations: Abdomen is soft.      Tenderness: There is no abdominal tenderness.   Musculoskeletal:         General: Normal range of motion.      Cervical back: Normal range of motion.      Right lower leg: No edema.      Left lower leg: No edema.   Lymphadenopathy:      Cervical: No cervical adenopathy.   Skin:     General: Skin is warm and dry.      Capillary Refill: Capillary refill takes less than 2 seconds.   Neurological:      General: No focal deficit present.      Mental Status: She is alert and oriented to person, place, and time.   Psychiatric:         Mood and Affect: Mood normal.         Behavior: Behavior normal.         Thought Content: Thought content normal.         Judgment: Judgment normal.          PAT AIRWAY:   Airway:     Mallampati::  III    Neck ROM::  Full  normal        Visit Vitals  /84 Comment: manual right upper extremity, adult cuff   Pulse 76   Temp 36.5 °C (97.7 °F)       DASI Risk Score      Flowsheet Row Most Recent Value   DASI SCORE 58.2   METS Score (Will be calculated only when all the questions are answered) 9.9          Caprini DVT Assessment      Flowsheet Row Most Recent Value   DVT Score 4   Current Status Major surgery planned, including arthroscopic and laproscopic (1-2 hours)   History Prior major surgery   Age Less than 40 years   BMI 30 or less          Modified Frailty Index      Flowsheet Row Most Recent Value   Modified Frailty Index Calculator 0          CHADS2 Stroke Risk   Current as of 17 minutes ago        N/A 3 to 100%: High Risk   2 to < 3%: Medium Risk   0 to < 2%: Low Risk     Last Change: N/A          This score determines the patient's risk of having a stroke if the patient has atrial fibrillation.        This score is not applicable to this patient. Components are not calculated.          Revised Cardiac Risk Index      Flowsheet Row Most Recent Value   Revised Cardiac Risk Calculator 0          Apfel Simplified Score      Flowsheet Row Most Recent Value   Apfel Simplified Score Calculator 2          Risk Analysis Index Results This Encounter    No data found in the last 1 encounters.       Stop Bang Score      Flowsheet Row Most Recent Value   Do you snore loudly? 0   Do you often feel tired or fatigued after your sleep? 0   Has anyone ever observed you stop breathing in your sleep? 0   Do you have or are you being treated for high blood pressure? 0   Recent BMI (Calculated) 24.7   Is BMI greater than 35 kg/m2? 0=No   Age older than 50 years old? 0=No   Is your neck circumference greater than 17 inches (Male) or 16 inches (Female)? 1   Gender - Male 0=No   STOP-BANG Total Score 1            Assessment and Plan:   Neuro:  aneurysmal subarachnoid hemorrhage status post balloon assisted coil embolization of basilar tip aneurysm on 10/02/2023.  Cerebral aneurysm base remnant scheduled for IR procedure.       The patient is at an increased risk for post operative delirium secondary to type and duration of surgery.  Preoperative brain exercise educational handout provided to patient.    The patient is at an increased risk for perioperative stroke secondary to female sex , neuro surgery, and general anesthesia.     HEENT/Airway:  No diagnosis or significant findings on chart review or clinical presentation and evaluation.     Cardiovascular:  No diagnosis or significant findings on chart review or clinical presentation and evaluation however see below risk screening tool. No  additional preoperative testing is currently indicated.    METS are 9.9    RCRI  0 which is 3.9% 30 day risk of MACE (risk for cardiac death, nonfatal myocardial infarction, and nonfactal cardiac arrest    EMI score which indicates a 0.2% risk of intraoperative or 30-day postoperative MACE      Pulmonary:  The patient is current tobacco user.  Advised to quit smoking to improve overall health and to decrease risks for anesthesia related complications as well as postoperative wound healing complications.  Smoking cessation educational handout provided to patient during appointment.  Preoperative deep breathing educational handout provided to patient.    ARISCAT:   0   points which is a low (1.6%) risk of in-hospital post-op pulmonary complications     PRODIGY:  0  points which is a low risk of post op opioid induced respiratory depression episodes    STOP BAN   points which is a low risk for moderate to severe ANDRZEJ    Renal:remote nephrolithiasis with no current s/s of concern that require preoperative intervention or evaluation.     Endocrine:  No diagnosis or significant findings on chart review or clinical presentation and evaluation.     Hematologic:   No diagnosis or significant findings on chart review or clinical presentation and evaluation however see below risk screening tool.  Preoperative DVT educational handout provided to patient.    Caprini Score:  4  points which is a high risk of perioperative VTE    Gastrointestinal: No diagnosis or significant findings on chart review or clinical presentation and evaluation.     EAT-10 score of  0  - self-perceived oropharyngeal dysphagia scale (0-40)     Apfel: 2 points 39% risk for post operative N/V    Infectious disease:  No diagnosis or significant findings on chart review or clinical presentation and evaluation.      Musculoskeletal: No diagnosis or significant findings on chart review or clinical presentation and evaluation.      Other:   PCOS previously  on metformin, however currently note taking.  No current issues requiring preoperative intervention or evaluation at this time.       Labs ordered  Recent Results (from the past 336 hour(s))   CBC    Collection Time: 08/15/24  1:20 PM   Result Value Ref Range    WBC 14.6 (H) 4.4 - 11.3 x10*3/uL    nRBC 0.0 0.0 - 0.0 /100 WBCs    RBC 4.96 4.00 - 5.20 x10*6/uL    Hemoglobin 14.9 12.0 - 16.0 g/dL    Hematocrit 45.0 36.0 - 46.0 %    MCV 91 80 - 100 fL    MCH 30.0 26.0 - 34.0 pg    MCHC 33.1 32.0 - 36.0 g/dL    RDW 13.0 11.5 - 14.5 %    Platelets 335 150 - 450 x10*3/uL   Basic Metabolic Panel    Collection Time: 08/15/24  1:20 PM   Result Value Ref Range    Glucose 74 74 - 99 mg/dL    Sodium 138 136 - 145 mmol/L    Potassium 4.3 3.5 - 5.3 mmol/L    Chloride 100 98 - 107 mmol/L    Bicarbonate 27 21 - 32 mmol/L    Anion Gap 15 10 - 20 mmol/L    Urea Nitrogen 13 6 - 23 mg/dL    Creatinine 0.63 0.50 - 1.05 mg/dL    eGFR >90 >60 mL/min/1.73m*2    Calcium 9.3 8.6 - 10.6 mg/dL   Type And Screen    Collection Time: 08/15/24  1:20 PM   Result Value Ref Range    ABO TYPE A     Rh TYPE POS     ANTIBODY SCREEN NEG    Staphylococcus aureus/MRSA colonization, Culture    Collection Time: 08/15/24  1:20 PM    Specimen: Nares/Axilla/Groin; Swab   Result Value Ref Range    Staph/MRSA Screen Culture No Staphylococcus aureus isolated          Lab Results   Component Value Date    INR 1.1 06/19/2024    INR 1.0 10/08/2023    INR 1.1 10/02/2023    PROTIME 12.2 06/19/2024    PROTIME 11.4 10/08/2023    PROTIME 12.8 10/02/2023

## 2024-08-15 NOTE — PREPROCEDURE INSTRUCTIONS
Thank you for visiting [facility] ([facility]) today for your pre-procedure evaluation, you were seen by     Samantha Meeson, MSN, NP-C  Adult-Gerontology Nurse Practitioner II  Department of Anesthesiology and Perioperative Medicine  Main phone [phone]  Direct phone [phone]  Fax [phone]     This summary includes instructions and information to aid you during your perioperative period.  Please read carefully. If you have any questions about your visit today, please call the number listed above.  If you become ill or have any changes to your health before your surgery, please contact your primary care provider and alert your surgeon.    Preparing for your Surgery       Exercises  Preoperative Deep Breathing Exercises  Why it is important to do deep breathing exercises before my surgery?  Deep breathing exercises strengthen your breathing muscles.  This helps you to recover after your surgery and decreases the chance of breathing complications.  How are the deep breathing exercises done?  Sit straight with your back supported.  Breathe in deeply and slowly through your nose. Your lower rib cage should expand and your abdomen may move forward.  Hold that breath for 3 to 5 seconds.  Breathe out through pursed lips, slowly and completely.  Rest and repeat 10 times every hour while awake.  Rest longer if you become dizzy or lightheaded.       Incentive Spirometer   You were provided with an incentive spirometer in CPM/PAT, please follow the below instructions.   You were not provided an incentive spirometer in CPM, please disregard the incentive spirometer instructions  What is an incentive spirometer?  An incentive spirometer is a device used before and after surgery to “exercise” your lungs.  It helps you to take deeper breaths to expand your lungs.  Below is an example of a basic incentive spirometer.  The device you receive may differ slightly but they all function the  same.    Why do I need to use an incentive spirometer?  Using your incentive spirometer prepares your lungs for surgery and helps prevent lung problems after surgery.  How do I use my incentive spirometer?  When you're using your incentive spirometer, make sure to breathe through your mouth. If you breathe through your nose, the incentive spirometer won't work properly. You can hold your nose if you have trouble.  If you feel dizzy at any time, stop and rest. Try again at a later time.  Follow the steps below:  Set up your incentive spirometer, expand the flexible tubing and connect to the outlet.  Sit upright in a chair or bed. Hold the incentive spirometer at eye level.   Put the mouthpiece in your mouth and close your lips tightly around it. Slowly breathe out (exhale) completely.  Breathe in (inhale) slowly through your mouth as deeply as you can. As you take a breath, you will see the piston rise inside the large column. While the piston rises, the indicator should move upwards. It should stay in between the 2 arrows (see Figure).  Try to get the piston as high as you can, while keeping the indicator between the arrows.   If the indicator doesn't stay between the arrows, you're breathing either too fast or too slow.  When you get it as high as you can, hold your breath for 10 seconds, or as long as possible. While you're holding your breath, the piston will slowly fall to the base of the spirometer.  Once the piston reaches the bottom of the spirometer, breathe out slowly through your mouth. Rest for a few seconds.  Repeat 10 times. Try to get the piston to the same level with each breath.  Repeat every hour while awake  You can carefully clean the outside of the mouthpiece with an alcohol wipe or soap and water.      Preoperative Brain Exercises    What are brain exercises?  A brain exercise is any activity that engages your thinking (cognitive) skills.    What types of activities are considered brain  exercises?  Jigsaw puzzles, crossword puzzles, word jumble, memory games, word search, and many more.  Many can be found free online or on your phone via a mobile laurel.    Why should I do brain exercises before my surgery?  More recent research has shown brain exercise before surgery can lower the risk of postoperative delirium (confusion) which can be especially important for older adults.  Patients who did brain exercises for 5 to 10 hours the days before surgery, cut their risk of postoperative delirium in half up to 1 week after surgery.    Sit-to-Stand Exercise    What is the sit-to-stand exercise?  The sit-to-stand exercise strengthens the muscles of your lower body and muscles in the center of your body (core muscles for stability) helping to maintain and improve your strength and mobility.  How do I do the sit-to-stand exercise?  The goal is to do this exercise without using your arms or hands.  If this is too difficult, use your arms and hands or a chair with armrests to help slowly push yourself to the standing position and lower yourself back to the sitting position. As the movement becomes easier use your arms and hands less.    Steps to the sit-to-stand exercise  Sit up tall in a sturdy chair, knees bent, feet flat on the floor shoulder-width apart.  Shift your hips/pelvis forward in the chair to correctly position yourself for the next movement.  Lean forward at your hips.  Stand up straight putting equal weight on both feet.  Check to be sure you are properly aligned with the chair, in a slow controlled movement sit back down.  Repeat this exercise 10-15 times.  If needed you can do it fewer times until your strength improves.  Rest for 1 minute.  Do another 10-15 sit-to-stand exercises.  Try to do this in the morning and evening.        Instructions    Preoperative Fasting Guidelines    Why must I stop eating and drinking near surgery time?  With sedation, food or liquid in your stomach can enter your  lungs causing serious complications  Food can increase nausea and vomiting  When do I need to stop eating and drinking before my surgery?      Do not eat any food after midnight the night before your surgery/procedure. You may have up to 13.5 ounces of clear liquid until TWO hours before your instructed arrival time to the hospital.  This includes water, black tea/coffee, (no milk or cream) apple juice, and electrolyte drinks (Gatorade). You may chew gum until TWO hours before your surgery/procedure            Simple things you can do to help prevent blood clots     Blood clots are blockages that can form in the body's veins. When a blood clot forms in your deep veins, it may be called a deep vein thrombosis, or DVT for short. Blood clots can happen in any part of the body where blood flows, but they are most common in the arms and legs. If a piece of a blood clot breaks free and travels to the lungs, it is called a pulmonary embolus (PE). A PE can be a very serious problem.         Being in the hospital or having surgery can raise your chances of getting a blood clot because you may not be well enough to move around as much as you normally do.         Ways you can help prevent blood clots in the hospital       Wearing SCDs  SCDs stands for Sequential Compression Devices.   SCDs are special sleeves that wrap around your legs. They attach to a pump that fills them with air to gently squeeze your legs every few minutes.  This helps return the blood in your legs to your heart.   SCDs should only be taken off when walking or bathing. SCDs may not be comfortable, but they can help save your life.              Pump SCD leg sleeves  Wearing compression stockings - if your doctor orders them. These special snug-fitting stockings gently squeeze your legs to help blood flow.       Walking. Walking helps move the blood in your legs.   If your doctor says it is ok, try walking the halls at least   5 times a day. Ask us to help  you get up, so you don't fall.      Taking any blood-thinning medicines your doctor orders.              Ways you can help prevent blood clots at home         Wearing compression stockings - if your doctor orders them.   Walking - to help move the blood in your legs.    Taking any blood-thinning medicines your doctor orders.      Signs of a blood clot or PE    Tell your doctor or nurse right away if you have any of the problems listed below.         If you are at home, seek medical care right away. Call 911 for chest pain or problems breathing.            Signs of a blood clot (DVT) - such as pain, swelling, redness, or warmth in your arm or legs.  Signs of a pulmonary embolism (PE) - such as chest pain or feeling short of breath      Tobacco and Alcohol;  Do not drink alcohol or smoke within 24 hours of surgery.  It is best to quit smoking for as long as possible before any surgery or procedure.        The Week before Surgery        Seven days before Surgery  Check your CPM medication instructions  Do the exercises provided to you by CPM   Arrange for a responsible, adult licensed  to take you home after surgery and stay with you for 24 hours.  You will not be permitted to drive yourself home if you have received any anesthetic/sedation  Six days before surgery  Check your CPM medication instructions  Do the exercises provided to you by CPM   Start using Chlorhexidene (CHG) body wash if prescribed  Five days before surgery  Check your CPM medication instructions  Do the exercises provided to you by CPM   Continue to use CHG body wash if prescribed  Three days before surgery  Check your CPM medication instructions  Do the exercises provided to you by CPM   Continue to use CHG body wash if prescribed  Two days before surgery  Check your CPM medication instructions  Do the exercises provided to you by CPM   Continue to use CHG body wash if prescribed    The Day before Surgery       Check your CPM medication and  all other CPM instructions including when to stop eating and drinking  You will be called with your arrival time for surgery in the late afternoon.  If you do not receive a call please reach out to your surgeon's office.  Do not smoke or drink 24 hours before surgery  Prepare items to bring with you to the hospital  Shower with your chlorhexidine wash if prescribed  Brush your teeth and use your chlorhexidine dental rinse if prescribed    The Day of Surgery       Check your CPM medication instructions  Ensure you follow the instructions for when to stop eating and drinking  Shower, if prescribed use CHG.  Do not apply any lotions, creams, moisturizers, perfume or deodorant  Brush your teeth and use your CHG dental rinse if prescribed  Wear loose comfortable clothing  Avoid make-up  Remove  jewelry and piercings, consider professional piercing removal with a plastic spacer if needed  Bring photo ID and Insurance card  Bring an accurate medication list that includes medication dose, frequency and allergies  Bring a copy of your advanced directives (will, health care power of )  Bring any devices and controllers as well as medical devices you have been provided with for surgery (CPAP, slings, braces, etc.)  Dentures, eyeglasses, and contacts will be removed before surgery, please bring cases for contacts or glasses

## 2024-08-17 LAB — STAPHYLOCOCCUS SPEC CULT: NORMAL

## 2024-08-26 ENCOUNTER — ANESTHESIA (OUTPATIENT)
Dept: RADIOLOGY | Facility: HOSPITAL | Age: 39
DRG: 027 | End: 2024-08-26
Payer: COMMERCIAL

## 2024-08-26 ENCOUNTER — ANESTHESIA EVENT (OUTPATIENT)
Dept: RADIOLOGY | Facility: HOSPITAL | Age: 39
DRG: 027 | End: 2024-08-26
Payer: COMMERCIAL

## 2024-08-26 ENCOUNTER — HOSPITAL ENCOUNTER (INPATIENT)
Dept: RADIOLOGY | Facility: HOSPITAL | Age: 39
LOS: 1 days | Discharge: HOME | DRG: 027 | End: 2024-08-27
Attending: NEUROLOGICAL SURGERY | Admitting: NEUROLOGICAL SURGERY
Payer: COMMERCIAL

## 2024-08-26 DIAGNOSIS — I72.5 ANEURYSM OF BASILAR ARTERY (CMS-HCC): ICD-10-CM

## 2024-08-26 DIAGNOSIS — R52 PAIN: ICD-10-CM

## 2024-08-26 DIAGNOSIS — I67.1 CEREBRAL ANEURYSM (HHS-HCC): Primary | ICD-10-CM

## 2024-08-26 DIAGNOSIS — R51.9 GENERALIZED HEADACHES: ICD-10-CM

## 2024-08-26 PROCEDURE — 75898 FOLLOW-UP ANGIOGRAPHY: CPT | Performed by: NEUROLOGICAL SURGERY

## 2024-08-26 PROCEDURE — 75710 ARTERY X-RAYS ARM/LEG: CPT | Mod: RT | Performed by: NEUROLOGICAL SURGERY

## 2024-08-26 PROCEDURE — 2500000005 HC RX 250 GENERAL PHARMACY W/O HCPCS: Performed by: STUDENT IN AN ORGANIZED HEALTH CARE EDUCATION/TRAINING PROGRAM

## 2024-08-26 PROCEDURE — 3700000001 HC GENERAL ANESTHESIA TIME - INITIAL BASE CHARGE: Performed by: NEUROLOGICAL SURGERY

## 2024-08-26 PROCEDURE — 36226 PLACE CATH VERTEBRAL ART: CPT | Performed by: NEUROLOGICAL SURGERY

## 2024-08-26 PROCEDURE — 03VG3DZ RESTRICTION OF INTRACRANIAL ARTERY WITH INTRALUMINAL DEVICE, PERCUTANEOUS APPROACH: ICD-10-PCS | Performed by: NEUROLOGICAL SURGERY

## 2024-08-26 PROCEDURE — 2500000004 HC RX 250 GENERAL PHARMACY W/ HCPCS (ALT 636 FOR OP/ED): Performed by: STUDENT IN AN ORGANIZED HEALTH CARE EDUCATION/TRAINING PROGRAM

## 2024-08-26 PROCEDURE — 37244 VASC EMBOLIZE/OCCLUDE BLEED: CPT | Performed by: NEUROLOGICAL SURGERY

## 2024-08-26 PROCEDURE — 61624 TCAT PERM OCCLS/EMBOLJ CNS: CPT | Performed by: NEUROLOGICAL SURGERY

## 2024-08-26 PROCEDURE — C1889 IMPLANT/INSERT DEVICE, NOC: HCPCS | Performed by: NEUROLOGICAL SURGERY

## 2024-08-26 PROCEDURE — 2780000003 HC OR 278 NO HCPCS: Performed by: NEUROLOGICAL SURGERY

## 2024-08-26 PROCEDURE — 7100000002 HC RECOVERY ROOM TIME - EACH INCREMENTAL 1 MINUTE: Performed by: NEUROLOGICAL SURGERY

## 2024-08-26 PROCEDURE — C1887 CATHETER, GUIDING: HCPCS | Performed by: NEUROLOGICAL SURGERY

## 2024-08-26 PROCEDURE — B31R1ZZ FLUOROSCOPY OF INTRACRANIAL ARTERIES USING LOW OSMOLAR CONTRAST: ICD-10-PCS | Performed by: NEUROLOGICAL SURGERY

## 2024-08-26 PROCEDURE — 2060000001 HC INTERMEDIATE ICU ROOM DAILY

## 2024-08-26 PROCEDURE — 3700000002 HC GENERAL ANESTHESIA TIME - EACH INCREMENTAL 1 MINUTE: Performed by: NEUROLOGICAL SURGERY

## 2024-08-26 PROCEDURE — C1760 CLOSURE DEV, VASC: HCPCS | Performed by: NEUROLOGICAL SURGERY

## 2024-08-26 PROCEDURE — 2720000007 HC OR 272 NO HCPCS: Performed by: NEUROLOGICAL SURGERY

## 2024-08-26 PROCEDURE — 36140 INTRO NDL ICATH UPR/LXTR ART: CPT | Performed by: NEUROLOGICAL SURGERY

## 2024-08-26 PROCEDURE — 75894 X-RAYS TRANSCATH THERAPY: CPT | Performed by: NEUROLOGICAL SURGERY

## 2024-08-26 PROCEDURE — C1769 GUIDE WIRE: HCPCS | Performed by: NEUROLOGICAL SURGERY

## 2024-08-26 PROCEDURE — 2500000002 HC RX 250 W HCPCS SELF ADMINISTERED DRUGS (ALT 637 FOR MEDICARE OP, ALT 636 FOR OP/ED): Performed by: STUDENT IN AN ORGANIZED HEALTH CARE EDUCATION/TRAINING PROGRAM

## 2024-08-26 PROCEDURE — 99223 1ST HOSP IP/OBS HIGH 75: CPT | Performed by: NEUROLOGICAL SURGERY

## 2024-08-26 PROCEDURE — 76377 3D RENDER W/INTRP POSTPROCES: CPT | Performed by: NEUROLOGICAL SURGERY

## 2024-08-26 PROCEDURE — 7100000001 HC RECOVERY ROOM TIME - INITIAL BASE CHARGE: Performed by: NEUROLOGICAL SURGERY

## 2024-08-26 RX ORDER — SODIUM CHLORIDE 9 MG/ML
75 INJECTION, SOLUTION INTRAVENOUS CONTINUOUS
Status: DISCONTINUED | OUTPATIENT
Start: 2024-08-26 | End: 2024-08-27 | Stop reason: HOSPADM

## 2024-08-26 RX ORDER — ALBUTEROL SULFATE 0.83 MG/ML
2.5 SOLUTION RESPIRATORY (INHALATION) ONCE AS NEEDED
Status: DISCONTINUED | OUTPATIENT
Start: 2024-08-26 | End: 2024-08-26 | Stop reason: HOSPADM

## 2024-08-26 RX ORDER — LABETALOL HYDROCHLORIDE 5 MG/ML
10 INJECTION, SOLUTION INTRAVENOUS EVERY 10 MIN PRN
Status: DISCONTINUED | OUTPATIENT
Start: 2024-08-26 | End: 2024-08-27 | Stop reason: HOSPADM

## 2024-08-26 RX ORDER — ACETAMINOPHEN 325 MG/1
650 TABLET ORAL EVERY 4 HOURS PRN
Status: DISCONTINUED | OUTPATIENT
Start: 2024-08-26 | End: 2024-08-27 | Stop reason: HOSPADM

## 2024-08-26 RX ORDER — ONDANSETRON HYDROCHLORIDE 2 MG/ML
INJECTION, SOLUTION INTRAVENOUS AS NEEDED
Status: DISCONTINUED | OUTPATIENT
Start: 2024-08-26 | End: 2024-08-26

## 2024-08-26 RX ORDER — MIDAZOLAM HYDROCHLORIDE 1 MG/ML
INJECTION, SOLUTION INTRAMUSCULAR; INTRAVENOUS AS NEEDED
Status: DISCONTINUED | OUTPATIENT
Start: 2024-08-26 | End: 2024-08-26

## 2024-08-26 RX ORDER — HYDROMORPHONE HYDROCHLORIDE 1 MG/ML
0.4 INJECTION, SOLUTION INTRAMUSCULAR; INTRAVENOUS; SUBCUTANEOUS EVERY 5 MIN PRN
Status: DISCONTINUED | OUTPATIENT
Start: 2024-08-26 | End: 2024-08-26 | Stop reason: HOSPADM

## 2024-08-26 RX ORDER — EPTIFIBATIDE 0.75 MG/ML
2 INJECTION, SOLUTION INTRAVENOUS CONTINUOUS
Status: DISCONTINUED | OUTPATIENT
Start: 2024-08-26 | End: 2024-08-27

## 2024-08-26 RX ORDER — EPTIFIBATIDE 0.75 MG/ML
INJECTION, SOLUTION INTRAVENOUS CONTINUOUS PRN
Status: DISCONTINUED | OUTPATIENT
Start: 2024-08-26 | End: 2024-08-26

## 2024-08-26 RX ORDER — HYDROMORPHONE HYDROCHLORIDE 1 MG/ML
0.2 INJECTION, SOLUTION INTRAMUSCULAR; INTRAVENOUS; SUBCUTANEOUS EVERY 5 MIN PRN
Status: DISCONTINUED | OUTPATIENT
Start: 2024-08-26 | End: 2024-08-26 | Stop reason: HOSPADM

## 2024-08-26 RX ORDER — HEPARIN SODIUM 5000 [USP'U]/ML
5000 INJECTION, SOLUTION INTRAVENOUS; SUBCUTANEOUS EVERY 8 HOURS SCHEDULED
Status: DISCONTINUED | OUTPATIENT
Start: 2024-08-26 | End: 2024-08-27 | Stop reason: HOSPADM

## 2024-08-26 RX ORDER — ONDANSETRON HYDROCHLORIDE 2 MG/ML
4 INJECTION, SOLUTION INTRAVENOUS EVERY 8 HOURS PRN
Status: DISCONTINUED | OUTPATIENT
Start: 2024-08-26 | End: 2024-08-27 | Stop reason: HOSPADM

## 2024-08-26 RX ORDER — ACETAMINOPHEN 325 MG/1
650 TABLET ORAL EVERY 4 HOURS PRN
Status: DISCONTINUED | OUTPATIENT
Start: 2024-08-26 | End: 2024-08-26 | Stop reason: HOSPADM

## 2024-08-26 RX ORDER — ESMOLOL HYDROCHLORIDE 10 MG/ML
INJECTION INTRAVENOUS AS NEEDED
Status: DISCONTINUED | OUTPATIENT
Start: 2024-08-26 | End: 2024-08-26

## 2024-08-26 RX ORDER — ONDANSETRON 4 MG/1
4 TABLET, FILM COATED ORAL EVERY 8 HOURS PRN
Status: DISCONTINUED | OUTPATIENT
Start: 2024-08-26 | End: 2024-08-27 | Stop reason: HOSPADM

## 2024-08-26 RX ORDER — EPTIFIBATIDE 2 MG/ML
INJECTION, SOLUTION INTRAVENOUS AS NEEDED
Status: DISCONTINUED | OUTPATIENT
Start: 2024-08-26 | End: 2024-08-26

## 2024-08-26 RX ORDER — PROPOFOL 10 MG/ML
INJECTION, EMULSION INTRAVENOUS AS NEEDED
Status: DISCONTINUED | OUTPATIENT
Start: 2024-08-26 | End: 2024-08-26

## 2024-08-26 RX ORDER — ONDANSETRON HYDROCHLORIDE 2 MG/ML
4 INJECTION, SOLUTION INTRAVENOUS ONCE AS NEEDED
Status: DISCONTINUED | OUTPATIENT
Start: 2024-08-26 | End: 2024-08-26 | Stop reason: HOSPADM

## 2024-08-26 RX ORDER — ROCURONIUM BROMIDE 10 MG/ML
INJECTION, SOLUTION INTRAVENOUS AS NEEDED
Status: DISCONTINUED | OUTPATIENT
Start: 2024-08-26 | End: 2024-08-26

## 2024-08-26 RX ORDER — FENTANYL CITRATE 50 UG/ML
INJECTION, SOLUTION INTRAMUSCULAR; INTRAVENOUS AS NEEDED
Status: DISCONTINUED | OUTPATIENT
Start: 2024-08-26 | End: 2024-08-26

## 2024-08-26 RX ORDER — POLYETHYLENE GLYCOL 3350 17 G/17G
17 POWDER, FOR SOLUTION ORAL DAILY
Status: DISCONTINUED | OUTPATIENT
Start: 2024-08-27 | End: 2024-08-27 | Stop reason: HOSPADM

## 2024-08-26 RX ORDER — PHENYLEPHRINE HCL IN 0.9% NACL 0.4MG/10ML
SYRINGE (ML) INTRAVENOUS AS NEEDED
Status: DISCONTINUED | OUTPATIENT
Start: 2024-08-26 | End: 2024-08-26

## 2024-08-26 RX ORDER — SODIUM CHLORIDE, SODIUM LACTATE, POTASSIUM CHLORIDE, CALCIUM CHLORIDE 600; 310; 30; 20 MG/100ML; MG/100ML; MG/100ML; MG/100ML
100 INJECTION, SOLUTION INTRAVENOUS CONTINUOUS
Status: DISCONTINUED | OUTPATIENT
Start: 2024-08-26 | End: 2024-08-26 | Stop reason: HOSPADM

## 2024-08-26 RX ORDER — LIDOCAINE IN NACL,ISO-OSMOT/PF 30 MG/3 ML
0.1 SYRINGE (ML) INJECTION ONCE
Status: DISCONTINUED | OUTPATIENT
Start: 2024-08-26 | End: 2024-08-26 | Stop reason: HOSPADM

## 2024-08-26 RX ORDER — LABETALOL HYDROCHLORIDE 5 MG/ML
5 INJECTION, SOLUTION INTRAVENOUS ONCE AS NEEDED
Status: DISCONTINUED | OUTPATIENT
Start: 2024-08-26 | End: 2024-08-26 | Stop reason: HOSPADM

## 2024-08-26 RX ORDER — HEPARIN SODIUM 1000 [USP'U]/ML
INJECTION, SOLUTION INTRAVENOUS; SUBCUTANEOUS AS NEEDED
Status: DISCONTINUED | OUTPATIENT
Start: 2024-08-26 | End: 2024-08-26

## 2024-08-26 RX ORDER — HYDRALAZINE HYDROCHLORIDE 20 MG/ML
10 INJECTION INTRAMUSCULAR; INTRAVENOUS
Status: DISCONTINUED | OUTPATIENT
Start: 2024-08-26 | End: 2024-08-27 | Stop reason: HOSPADM

## 2024-08-26 RX ORDER — NAPROXEN SODIUM 220 MG/1
81 TABLET, FILM COATED ORAL DAILY
Status: DISCONTINUED | OUTPATIENT
Start: 2024-08-27 | End: 2024-08-27 | Stop reason: HOSPADM

## 2024-08-26 RX ORDER — NALOXONE HYDROCHLORIDE 0.4 MG/ML
0.2 INJECTION, SOLUTION INTRAMUSCULAR; INTRAVENOUS; SUBCUTANEOUS EVERY 5 MIN PRN
Status: DISCONTINUED | OUTPATIENT
Start: 2024-08-26 | End: 2024-08-27 | Stop reason: HOSPADM

## 2024-08-26 SDOH — SOCIAL STABILITY: SOCIAL INSECURITY: HAS ANYONE EVER THREATENED TO HURT YOUR FAMILY OR YOUR PETS?: NO

## 2024-08-26 SDOH — SOCIAL STABILITY: SOCIAL INSECURITY: DO YOU FEEL UNSAFE GOING BACK TO THE PLACE WHERE YOU ARE LIVING?: NO

## 2024-08-26 SDOH — SOCIAL STABILITY: SOCIAL INSECURITY: HAVE YOU HAD THOUGHTS OF HARMING ANYONE ELSE?: NO

## 2024-08-26 SDOH — SOCIAL STABILITY: SOCIAL INSECURITY: DO YOU FEEL ANYONE HAS EXPLOITED OR TAKEN ADVANTAGE OF YOU FINANCIALLY OR OF YOUR PERSONAL PROPERTY?: NO

## 2024-08-26 SDOH — SOCIAL STABILITY: SOCIAL INSECURITY: ARE THERE ANY APPARENT SIGNS OF INJURIES/BEHAVIORS THAT COULD BE RELATED TO ABUSE/NEGLECT?: NO

## 2024-08-26 SDOH — SOCIAL STABILITY: SOCIAL INSECURITY: ABUSE: ADULT

## 2024-08-26 SDOH — SOCIAL STABILITY: SOCIAL INSECURITY: HAVE YOU HAD ANY THOUGHTS OF HARMING ANYONE ELSE?: NO

## 2024-08-26 SDOH — SOCIAL STABILITY: SOCIAL INSECURITY: ARE YOU OR HAVE YOU BEEN THREATENED OR ABUSED PHYSICALLY, EMOTIONALLY, OR SEXUALLY BY ANYONE?: NO

## 2024-08-26 SDOH — SOCIAL STABILITY: SOCIAL INSECURITY: DOES ANYONE TRY TO KEEP YOU FROM HAVING/CONTACTING OTHER FRIENDS OR DOING THINGS OUTSIDE YOUR HOME?: NO

## 2024-08-26 SDOH — SOCIAL STABILITY: SOCIAL INSECURITY: WERE YOU ABLE TO COMPLETE ALL THE BEHAVIORAL HEALTH SCREENINGS?: YES

## 2024-08-26 ASSESSMENT — PAIN SCALES - GENERAL
PAINLEVEL_OUTOF10: 0 - NO PAIN
PAINLEVEL_OUTOF10: 2
PAINLEVEL_OUTOF10: 1
PAINLEVEL_OUTOF10: 4
PAINLEVEL_OUTOF10: 4
PAINLEVEL_OUTOF10: 3
PAINLEVEL_OUTOF10: 3
PAINLEVEL_OUTOF10: 0 - NO PAIN

## 2024-08-26 ASSESSMENT — COGNITIVE AND FUNCTIONAL STATUS - GENERAL
DAILY ACTIVITIY SCORE: 24
MOBILITY SCORE: 24
PATIENT BASELINE BEDBOUND: NO

## 2024-08-26 ASSESSMENT — LIFESTYLE VARIABLES
AUDIT-C TOTAL SCORE: 3
SKIP TO QUESTIONS 9-10: 0
HOW OFTEN DO YOU HAVE 6 OR MORE DRINKS ON ONE OCCASION: NEVER
HOW MANY STANDARD DRINKS CONTAINING ALCOHOL DO YOU HAVE ON A TYPICAL DAY: 5 OR 6
AUDIT-C TOTAL SCORE: 3
HOW OFTEN DO YOU HAVE A DRINK CONTAINING ALCOHOL: MONTHLY OR LESS

## 2024-08-26 ASSESSMENT — PATIENT HEALTH QUESTIONNAIRE - PHQ9
SUM OF ALL RESPONSES TO PHQ9 QUESTIONS 1 & 2: 0
1. LITTLE INTEREST OR PLEASURE IN DOING THINGS: NOT AT ALL
2. FEELING DOWN, DEPRESSED OR HOPELESS: NOT AT ALL

## 2024-08-26 ASSESSMENT — PAIN DESCRIPTION - ORIENTATION: ORIENTATION: RIGHT

## 2024-08-26 ASSESSMENT — ACTIVITIES OF DAILY LIVING (ADL)
DRESSING YOURSELF: INDEPENDENT
FEEDING YOURSELF: INDEPENDENT
TOILETING: INDEPENDENT
JUDGMENT_ADEQUATE_SAFELY_COMPLETE_DAILY_ACTIVITIES: YES
GROOMING: INDEPENDENT
ADEQUATE_TO_COMPLETE_ADL: YES
HEARING - RIGHT EAR: FUNCTIONAL
PATIENT'S MEMORY ADEQUATE TO SAFELY COMPLETE DAILY ACTIVITIES?: YES
BATHING: INDEPENDENT
LACK_OF_TRANSPORTATION: NO
WALKS IN HOME: INDEPENDENT
HEARING - LEFT EAR: FUNCTIONAL

## 2024-08-26 ASSESSMENT — PAIN - FUNCTIONAL ASSESSMENT
PAIN_FUNCTIONAL_ASSESSMENT: 0-10

## 2024-08-26 ASSESSMENT — PAIN DESCRIPTION - LOCATION: LOCATION: GROIN

## 2024-08-26 ASSESSMENT — COLUMBIA-SUICIDE SEVERITY RATING SCALE - C-SSRS
6. HAVE YOU EVER DONE ANYTHING, STARTED TO DO ANYTHING, OR PREPARED TO DO ANYTHING TO END YOUR LIFE?: NO
2. HAVE YOU ACTUALLY HAD ANY THOUGHTS OF KILLING YOURSELF?: NO
1. IN THE PAST MONTH, HAVE YOU WISHED YOU WERE DEAD OR WISHED YOU COULD GO TO SLEEP AND NOT WAKE UP?: NO

## 2024-08-26 NOTE — PRE-PROCEDURE NOTE
Pre-Procedure H&P     Provider Assessment:  Diagnosis/Reason for Procedure: basilar tip aneurysm  Procedure: Diagnostic Cerebral Angiogram, stent assisted coiling of basilar tip aneurysm  Medications Reviewed:   yes   Prophylatic Antibiotics Needed:   no    Neuro status: A&Ox3, moving all extremities full strength   Mouth Opening OK: yes   Neck Flexibility OK: yes   Sedation Plan: General anesthesia  COVID-19 Risk Consent:  Surgeon has reviewed key risks related to the risk of josé manuel COVID-19 and if they contract COVID-19 what the risks are.

## 2024-08-26 NOTE — ANESTHESIA PROCEDURE NOTES
Airway  Date/Time: 8/26/2024 1:46 PM  Urgency: elective    Airway not difficult    Staffing  Performed: resident   Authorized by: Zelda Montoya MD    Performed by: Inocencio Eugene MD  Patient location during procedure: OR    Indications and Patient Condition  Indications for airway management: anesthesia  Spontaneous Ventilation: absent  Sedation level: deep  Preoxygenated: yes  Patient position: sniffing  Mask difficulty assessment: 1 - vent by mask  Planned trial extubation    Final Airway Details  Final airway type: endotracheal airway      Successful airway: ETT  Cuffed: yes   Successful intubation technique: direct laryngoscopy  Facilitating devices/methods: intubating stylet  Endotracheal tube insertion site: oral  Blade: Sofia  Blade size: #3  ETT size (mm): 7.0  Cormack-Lehane Classification: grade I - full view of glottis  Placement verified by: chest auscultation and capnometry   Measured from: teeth  ETT to teeth (cm): 21  Number of attempts at approach: 1

## 2024-08-26 NOTE — HOSPITAL COURSE
38 year old female with Hx of tobacco use, family history of SAH, 10/23 p/w headaches, found to have SAH, 6mm basilar tip aneurysm, s/p coil embolization, now with growing remnant who presented for scheduled procedure    8/26 s/p stent assisted coiling of basilar tip aneurysm remnant     Continue aspirin and brilinta for 6 months    Discharged with follow up requested

## 2024-08-26 NOTE — PROCEDURES
Pre-Procedure Verification and Time Out:  Procedure Location: procedure area  HUDDLE - Pre-procedure Verification:  completed  TIME OUT - Final Verification:  completed immediately prior to procedure start  DEBRIEF: completed    Complications:  None; patient tolerated the procedure well.     Disposition: PACU then ELIAN  Condition: stable  Specimens Collected: No specimens collected    General Information:   Anesthesia/ sedation: Non-Anesthesia  Indication(s)/Pre - Procedure Diagnoses: Cerebral aneurysm   Post-Procedure Diagnosis: Cerebral aneurysm   Procedure Name: Diagnostic Cerebral Angiogram with stent assisted coiling of cerebral aneurysm   Procedure performed by: Oren Boyd   Assistant(s): Frederic Girard  Estimated Blood Loss (mL): 10  Specimen: no  Informed Consent: consent obtained and in chart     Access: 6 Fr Sheath in R CFA  Closure: Mynx  Vessels Injected: L VA, R CFA   Moderate Sedation Time: Deep sedation provided by independent team   Findings: Basilar tip aneurysm remnant s/p stent assisted coiling, Full report to follow in PACS dictation

## 2024-08-26 NOTE — H&P
"History Of Present Illness  Reshma Landers is a 38 year old h/o smoking, family history of SAH, 10/23 p/w headaches, found to have SAH, 6mm basilar tip aneurysm, s/p coil embolization, 8/26 p/w growing remnant.      Past Medical History  Past Medical History:   Diagnosis Date    Aneurysmal subarachnoid hemorrhage ([facility]) 10/01/2023    s/p  balloon-assisted coil embolization of basilar tip aneurysm on 10/2/2023    Cerebral aneurysm ([facility]-HCC)     Nephrolithiasis     PCOS (polycystic ovarian syndrome)        Surgical History  Past Surgical History:   Procedure Laterality Date    IR ANGIOGRAM CEREBRAL BILATERAL Bilateral 10/09/2023    IR ANGIOGRAM CEREBRAL BILATERAL 10/9/2023 [place] [facility] [place]    KIDNEY SURGERY      states a surgery at 10 years old    MR HEAD ANGIO W AND WO IV CONTRAST  11/06/2023    MR HEAD ANGIO W AND WO IV CONTRAST 11/6/2023 [facility]        Social History  She reports that she has been smoking cigarettes. She started smoking about 25 years ago. She has a 12.8 pack-year smoking history. She has never been exposed to tobacco smoke. She has never used smokeless tobacco. She reports current alcohol use. She reports that she does not use drugs.    Family History  Family History   Problem Relation Name Age of Onset    Heart attack Mother      Hypertension Father          Allergies  Bactrim [sulfamethoxazole-trimethoprim] and Venom-wasp    Review of Systems     Physical Exam  GEN: AOx3. Normal memory, mood, and affect  CV: well perfused   LUNGS: breathing comfortably on room air   ABD: Soft, NT/ND, NBS, no masses or organomegaly.?  : N/A?SKIN: Warm, well perfused. No skin rashes or abnormal lesions.?  MSK: Normal gait. No deformities.?  Neuro:   Awake, Ox3   Face symmetric  Fcx4 with symmetric strength       Last Recorded Vitals  Blood pressure (!) 154/102, pulse 80, temperature 36.6 °C (97.9 °F), temperature source Temporal, resp. rate 16, height 1.575 m (5' 2\"), weight 61.2 kg (135 lb), SpO2 " 100%.       Assessment/Plan   Assessment & Plan  Cerebral aneurysm ([facility])      38 year old h/o smoking, family history of SAH, 10/23 p/w headaches, found to have SAH, 6mm basilar tip aneurysm, s/p coil embolization, 8/26 p/w growing remnant    8/26 s/p stent assisted coiling of basilar tip aneurysm remnant     Plan:   ELIAN  Patient currently on Integrilin gtt, once able to take PO, will give brilinta 90 mg and overlap with Integrilin for 1 hour   ASA 81 mg daily and Brilinta 90 mg BID   Home meds as appropriate   Groin precautions   Dispo in AM if stable            Alok Girard MD

## 2024-08-26 NOTE — SIGNIFICANT EVENT
MD called to bedside.  Patient dressing has large amount of bloody drainage.  MD is at the bedside changing the dressing.

## 2024-08-26 NOTE — ANESTHESIA POSTPROCEDURE EVALUATION
Patient: Rehsma Landers    Procedure Summary       Date: 08/26/24 Room / Location: [facility]    Anesthesia Start: 1333 Anesthesia Stop: 1626    Procedure: IR INTERVENTION NEURO STENT Diagnosis:       Cerebral aneurysm ([facility])      Cerebral aneurysm ([facility])      (Cerebral Angiogram)    Scheduled Providers: Zelda Montoya MD Responsible Provider: Zelda Montoya MD    Anesthesia Type: general ASA Status: 2            Anesthesia Type: general    Vitals Value Taken Time   /76 08/26/24 1626   Temp 36.2 08/26/24 1626   Pulse 69 08/26/24 1626   Resp 13 08/26/24 1626   SpO2 100 08/26/24 1626       Anesthesia Post Evaluation    Patient location during evaluation: PACU  Patient participation: complete - patient participated  Level of consciousness: awake and alert  Pain management: adequate  Airway patency: patent  Cardiovascular status: acceptable  Respiratory status: acceptable and face mask  Hydration status: acceptable  Postoperative Nausea and Vomiting: none        No notable events documented.

## 2024-08-26 NOTE — ANESTHESIA PREPROCEDURE EVALUATION
Patient: Reshma Landers    Procedure Information       Date/Time: 08/26/24 1200    Scheduled providers: Zelda Montoya MD    Procedure: IR INTERVENTION NEURO STENT    Location: [facility]            Relevant Problems   Anesthesia (within normal limits)      Cardiac (within normal limits)      Pulmonary (within normal limits)      Neuro   (+) Aneurysm of basilar artery (CMS-HCC)   (+) Cerebral aneurysm ([facility]-HCC)      /Renal   (+) Calculus of kidney   (+) Urinary tract infection      Musculoskeletal   (+) Chronic low back pain      ID   (+) Urinary tract infection      GYN   (+) Endometriosis   (+) Endometriosis of uterus   (+) Polycystic ovary syndrome       Clinical information reviewed:   Tobacco  Allergies  Meds   Med Hx  Surg Hx   Fam Hx          NPO Detail:  NPO/Void Status  Date of Last Liquid: 08/26/24  Time of Last Liquid: 0000  Date of Last Solid: 08/26/24  Time of Last Solid: 0000       Urine hcg negative  Physical Exam    Airway  Mallampati: II  TM distance: >3 FB  Neck ROM: full     Cardiovascular    Dental - normal exam     Pulmonary    Abdominal            Anesthesia Plan    History of general anesthesia?: yes  History of complications of general anesthesia?: no    ASA 2     general     intravenous induction   Trial extubation is planned.  Anesthetic plan and risks discussed with patient.

## 2024-08-26 NOTE — ANESTHESIA PROCEDURE NOTES
Arterial Line:    Date/Time: 8/26/2024 2:00 PM    Staffing  Performed: attending   Authorized by: Zelda Montoya MD    Performed by: Inocencio Eugene MD    An arterial line was placed. Procedure performed using surface landmarks.in the OR for the following indication(s): continuous blood pressure monitoring and blood sampling needed.    A 20 gauge (size), 1 and 3/4 inch (length), Angiocath (type) catheter was placed into the Right radial artery, secured by Tegadekrystina   Seldinger technique used.  Events:  patient tolerated procedure well with no complications.      Additional notes:  2 unsuccessful attempts on L radial prior to R radial

## 2024-08-26 NOTE — ANESTHESIA PROCEDURE NOTES
Peripheral IV  Date/Time: 8/26/2024 2:00 PM      Placement  Needle size: 18 G  Laterality: right  Location: hand  Local anesthetic: none  Site prep: alcohol  Technique: anatomical landmarks  Attempts: 1

## 2024-08-27 VITALS
HEIGHT: 62 IN | HEART RATE: 79 BPM | SYSTOLIC BLOOD PRESSURE: 109 MMHG | TEMPERATURE: 99.5 F | DIASTOLIC BLOOD PRESSURE: 81 MMHG | BODY MASS INDEX: 24.84 KG/M2 | WEIGHT: 135 LBS | RESPIRATION RATE: 22 BRPM | OXYGEN SATURATION: 95 %

## 2024-08-27 LAB
ALBUMIN SERPL BCP-MCNC: 3.6 G/DL (ref 3.4–5)
ANION GAP SERPL CALC-SCNC: 13 MMOL/L (ref 10–20)
BUN SERPL-MCNC: 9 MG/DL (ref 6–23)
CALCIUM SERPL-MCNC: 8.1 MG/DL (ref 8.6–10.6)
CHLORIDE SERPL-SCNC: 106 MMOL/L (ref 98–107)
CO2 SERPL-SCNC: 26 MMOL/L (ref 21–32)
CREAT SERPL-MCNC: 0.61 MG/DL (ref 0.5–1.05)
EGFRCR SERPLBLD CKD-EPI 2021: >90 ML/MIN/1.73M*2
ERYTHROCYTE [DISTWIDTH] IN BLOOD BY AUTOMATED COUNT: 13.1 % (ref 11.5–14.5)
GLUCOSE SERPL-MCNC: 81 MG/DL (ref 74–99)
HCT VFR BLD AUTO: 29.6 % (ref 36–46)
HGB BLD-MCNC: 9.8 G/DL (ref 12–16)
MCH RBC QN AUTO: 30.3 PG (ref 26–34)
MCHC RBC AUTO-ENTMCNC: 33.1 G/DL (ref 32–36)
MCV RBC AUTO: 92 FL (ref 80–100)
NRBC BLD-RTO: 0 /100 WBCS (ref 0–0)
PHOSPHATE SERPL-MCNC: 3.1 MG/DL (ref 2.5–4.9)
PLATELET # BLD AUTO: 267 X10*3/UL (ref 150–450)
POTASSIUM SERPL-SCNC: 3.7 MMOL/L (ref 3.5–5.3)
RBC # BLD AUTO: 3.23 X10*6/UL (ref 4–5.2)
SODIUM SERPL-SCNC: 141 MMOL/L (ref 136–145)
WBC # BLD AUTO: 10 X10*3/UL (ref 4.4–11.3)

## 2024-08-27 PROCEDURE — 99239 HOSP IP/OBS DSCHRG MGMT >30: CPT | Performed by: NURSE PRACTITIONER

## 2024-08-27 PROCEDURE — 85027 COMPLETE CBC AUTOMATED: CPT | Performed by: STUDENT IN AN ORGANIZED HEALTH CARE EDUCATION/TRAINING PROGRAM

## 2024-08-27 PROCEDURE — 36415 COLL VENOUS BLD VENIPUNCTURE: CPT | Performed by: STUDENT IN AN ORGANIZED HEALTH CARE EDUCATION/TRAINING PROGRAM

## 2024-08-27 PROCEDURE — 2500000001 HC RX 250 WO HCPCS SELF ADMINISTERED DRUGS (ALT 637 FOR MEDICARE OP): Performed by: STUDENT IN AN ORGANIZED HEALTH CARE EDUCATION/TRAINING PROGRAM

## 2024-08-27 PROCEDURE — 80069 RENAL FUNCTION PANEL: CPT | Performed by: STUDENT IN AN ORGANIZED HEALTH CARE EDUCATION/TRAINING PROGRAM

## 2024-08-27 PROCEDURE — 2500000002 HC RX 250 W HCPCS SELF ADMINISTERED DRUGS (ALT 637 FOR MEDICARE OP, ALT 636 FOR OP/ED): Performed by: STUDENT IN AN ORGANIZED HEALTH CARE EDUCATION/TRAINING PROGRAM

## 2024-08-27 PROCEDURE — 2500000004 HC RX 250 GENERAL PHARMACY W/ HCPCS (ALT 636 FOR OP/ED): Performed by: STUDENT IN AN ORGANIZED HEALTH CARE EDUCATION/TRAINING PROGRAM

## 2024-08-27 RX ORDER — ACETAMINOPHEN 325 MG/1
650 TABLET ORAL EVERY 4 HOURS PRN
Start: 2024-08-27

## 2024-08-27 RX ORDER — NAPROXEN SODIUM 220 MG/1
81 TABLET, FILM COATED ORAL DAILY
Qty: 30 TABLET | Refills: 5 | Status: SHIPPED | OUTPATIENT
Start: 2024-08-28

## 2024-08-27 ASSESSMENT — COGNITIVE AND FUNCTIONAL STATUS - GENERAL: MOBILITY SCORE: 24

## 2024-08-27 ASSESSMENT — PAIN - FUNCTIONAL ASSESSMENT
PAIN_FUNCTIONAL_ASSESSMENT: 0-10

## 2024-08-27 ASSESSMENT — PAIN SCALES - GENERAL
PAINLEVEL_OUTOF10: 0 - NO PAIN

## 2024-08-27 NOTE — DISCHARGE SUMMARY
Discharge Diagnosis  Cerebral aneurysm    Issues Requiring Follow-Up  Neurosurgery follow up    Test Results Pending At Discharge  Pending Labs       Order Current Status    CBC Collected (08/27/24 0913)    Renal Function Panel Collected (08/27/24 0913)            Hospital Course  38 year old female with Hx of tobacco use, family history of SAH, 10/23 p/w headaches, found to have SAH, 6mm basilar tip aneurysm, s/p coil embolization, now with growing remnant who presented for scheduled procedure    8/26 s/p stent assisted coiling of basilar tip aneurysm remnant     Continue aspirin and brilinta for 6 months    Discharged with follow up requested    Pertinent Physical Exam At Time of Discharge  Physical Exam  General: in bed, awake, no distress  HEENT: normocephalic; TED; tongue midline  Cardiac: S1 & S2 regular  Resp: equal chest expansion, lungs clear bilaterally  Abd: BS+ x4, soft, non-tender  Extr: no edema, right groin angio site oozing resolved after applying pressure and sutured, area clean, dry, intact, no hematoma; pulses palpable x4  Neuro: A&Ox3, follows commands, MOHAMUD's, 5/5 strength to all extremities  Skin: warm, dry, intact  Psyche: calm, cooperative        Home Medications     Medication List      START taking these medications     acetaminophen 325 mg tablet; Commonly known as: Tylenol; Take 2 tablets   (650 mg) by mouth every 4 hours if needed for mild pain (1 - 3) or   moderate pain (4 - 6).   aspirin 81 mg chewable tablet; Chew 1 tablet (81 mg) once daily. Take   for 6 months; Start taking on: August 28, 2024   ticagrelor 90 mg tablet; Commonly known as: Brilinta; Take 1 tablet (90   mg) by mouth 2 times a day. Take for 6 months     STOP taking these medications     chlorhexidine 4 % external liquid; Commonly known as: Hibiclens     I spent a total of 35 minutes with the patient and family and greater than 50% was spent in counseling and coordination of care.   Outpatient Follow-Up  No future  appointments.    Sujatha Osorio, APRN-CNP

## 2024-08-27 NOTE — PROGRESS NOTES
"Reshma Landers is a 38 y.o. female on day 1 of admission presenting with No Principal Problem: There is no principal problem currently on the Problem List. Please update the Problem List and refresh..    Subjective   No events overnight. Tolerating PO. Voided w/o difficulty.     Objective     Physical Exam  AOx3  FS, TM  BUE 5/5  BLE 5/5  R groin site w/o hematoma  2+ femoral and DP pulses  SILT    Last Recorded Vitals  Blood pressure 109/81, pulse 79, temperature 37.5 °C (99.5 °F), resp. rate 22, height 1.575 m (5' 2\"), weight 61.2 kg (135 lb), SpO2 95%.  Intake/Output last 3 Shifts:  I/O last 3 completed shifts:  In: 1420 (23.2 mL/kg) [P.O.:220; I.V.:200 (3.3 mL/kg); IV Piggyback:1000]  Out: 575 (9.4 mL/kg) [Urine:575 (0.3 mL/kg/hr)]  Weight: 61.2 kg     Relevant Results  Lab Results   Component Value Date    WBC 14.6 (H) 08/15/2024    HGB 14.9 08/15/2024    HCT 45.0 08/15/2024    MCV 91 08/15/2024     08/15/2024     Lab Results   Component Value Date    GLUCOSE 74 08/15/2024    CALCIUM 9.3 08/15/2024     08/15/2024    K 4.3 08/15/2024    CO2 27 08/15/2024     08/15/2024    BUN 13 08/15/2024    CREATININE 0.63 08/15/2024     Assessment/Plan   Assessment & Plan    39 y/o F w/ h/o SAH, 10/23 p/w headaches, found to have SAH, 6mm basilar tip aneurysm, s/p coil embolization, 8/26 p/w growing remnant, 8/26 s/p stent assisted coiling of basilar tip aneurysm remnant     Floor  Con't ASA, brilinta for 6 months  SCDs, SQH  Medically ready for discharge             Boaz Lange MD      "

## 2024-08-27 NOTE — NURSING NOTE
Skin assessment was done with Sofia Wilkins. Skin was intact. Pt only had a R groin angio site. No sores or wounds noted.

## 2024-08-27 NOTE — NURSING NOTE
Reshma Landers was discharged home today. She went home with her . This nurse went over discharge paperwork with her and she had no questions at the time. I gave her the floor number in case she does have questions. All belongings went with her.

## 2024-08-27 NOTE — CARE PLAN
The patient's goals for the shift include  sleep    The clinical goals for the shift include Patient will have no decline in exam overnight.    Over the shift, the patient was safe and free from injury, denied pain or discomfort, and had a stable exam. Anticipate discharge later today.  Problem: Pain - Adult  Goal: Verbalizes/displays adequate comfort level or baseline comfort level  Outcome: Progressing     Problem: Safety - Adult  Goal: Free from fall injury  Outcome: Progressing     Problem: Discharge Planning  Goal: Discharge to home or other facility with appropriate resources  Outcome: Progressing     Problem: Chronic Conditions and Co-morbidities  Goal: Patient's chronic conditions and co-morbidity symptoms are monitored and maintained or improved  Outcome: Progressing

## 2024-11-21 NOTE — PROGRESS NOTES
FUV - 3-month post procedure visit s/p stent assisted coiling of Basilar tip aneurysm remnant on 8/26/24. She has a history of aneurysmal subarachnoid hemorrhage status post balloon-assisted coil embolization of basilar tip aneurysm on 10/2/2023. She continues Asprin and Brilinta at this time.     I went over and showed her the treatment angiogram.     She has some bruising from the DAPT and had some left neck pain and vertigo, but otherwise is doing well overall neurologically. She remains intact on exam. We will get MRA w/wo post-coil protocol at the 6 month alejandra (Februrary 2025) and follow up with me at that time. She should continue DAPT until then.     Oren Boyd MD    Prep Time On Date of the Patient Encounter:    10 Minutes  Time Directly with Patient/Family/Caregiver:    15 Minutes  Additional Time Spent on Patient Care Activities:   0 Minutes  Documentation Time:       5 Minutes  Other Time Spent:       0 Minutes    Details of Other Time Spent:      Total Time on Date of Patient Encounter:    30 Minutes

## 2024-11-26 ENCOUNTER — OFFICE VISIT (OUTPATIENT)
Dept: NEUROSURGERY | Facility: CLINIC | Age: 39
End: 2024-11-26
Payer: COMMERCIAL

## 2024-11-26 VITALS
RESPIRATION RATE: 21 BRPM | SYSTOLIC BLOOD PRESSURE: 168 MMHG | HEIGHT: 63 IN | WEIGHT: 130 LBS | DIASTOLIC BLOOD PRESSURE: 96 MMHG | HEART RATE: 78 BPM | TEMPERATURE: 93.6 F | BODY MASS INDEX: 23.04 KG/M2

## 2024-11-26 DIAGNOSIS — I67.1 CEREBRAL ANEURYSM (HHS-HCC): ICD-10-CM

## 2024-11-26 DIAGNOSIS — Z98.890 S/P COIL EMBOLIZATION OF CEREBRAL ANEURYSM: ICD-10-CM

## 2024-11-26 PROCEDURE — 99214 OFFICE O/P EST MOD 30 MIN: CPT | Performed by: NEUROLOGICAL SURGERY

## 2024-11-26 PROCEDURE — 3008F BODY MASS INDEX DOCD: CPT | Performed by: NEUROLOGICAL SURGERY

## 2024-11-26 ASSESSMENT — PAIN SCALES - GENERAL: PAINLEVEL_OUTOF10: 0-NO PAIN

## 2025-02-20 ENCOUNTER — TELEPHONE (OUTPATIENT)
Dept: NEUROSURGERY | Facility: HOSPITAL | Age: 40
End: 2025-02-20
Payer: COMMERCIAL

## 2025-02-20 NOTE — TELEPHONE ENCOUNTER
I spoke with Reshma Abreule today to confirm that she can stop her Brilinta now and continue her 81mg Aspirin daily indefinitely per Dr. Oren Boyd of Neurosurgery. She is s/p stent assisted coiling of Basilar tip aneurysm remnant on 8/26/24. She has a history of aneurysmal subarachnoid hemorrhage status post balloon-assisted coil embolization of basilar tip aneurysm on 10/2/2023.     She has an MRA w/wo post-coil protocol scheduled on 2/26/25 ordered by Oliver Torres for 6 month post coiling. She agrees with the plan and will call back with any further questions, concerns, or updates.

## 2025-02-26 ENCOUNTER — HOSPITAL ENCOUNTER (OUTPATIENT)
Dept: RADIOLOGY | Facility: HOSPITAL | Age: 40
Discharge: HOME | End: 2025-02-26
Payer: COMMERCIAL

## 2025-02-26 DIAGNOSIS — Z98.890 S/P COIL EMBOLIZATION OF CEREBRAL ANEURYSM: ICD-10-CM

## 2025-02-26 PROCEDURE — 2550000001 HC RX 255 CONTRASTS: Performed by: NEUROLOGICAL SURGERY

## 2025-02-26 PROCEDURE — 70546 MR ANGIOGRAPH HEAD W/O&W/DYE: CPT

## 2025-02-26 PROCEDURE — A9575 INJ GADOTERATE MEGLUMI 0.1ML: HCPCS | Performed by: NEUROLOGICAL SURGERY

## 2025-02-26 RX ORDER — GADOTERATE MEGLUMINE 376.9 MG/ML
12 INJECTION INTRAVENOUS
Status: COMPLETED | OUTPATIENT
Start: 2025-02-26 | End: 2025-02-26

## 2025-02-26 RX ADMIN — GADOTERATE MEGLUMINE 12 ML: 376.9 INJECTION INTRAVENOUS at 08:30

## 2025-02-27 ENCOUNTER — TELEPHONE (OUTPATIENT)
Dept: NEUROSURGERY | Facility: HOSPITAL | Age: 40
End: 2025-02-27
Payer: COMMERCIAL

## 2025-02-27 DIAGNOSIS — Z98.890 S/P COIL EMBOLIZATION OF CEREBRAL ANEURYSM: ICD-10-CM

## 2025-02-27 DIAGNOSIS — I67.1 CEREBRAL ANEURYSM (HHS-HCC): ICD-10-CM

## 2025-02-27 NOTE — TELEPHONE ENCOUNTER
I had the pleasure of speaking with Reshma Landers in regards to her MRA w/wo post-coil protocol done on 2/26/25 for Aneurysm surveillance s/p stent assisted coiling of Basilar tip aneurysm remnant on 8/26/24 done by Dr. Oren Boyd of Neurosurgery.      Per Dr. Boyd:  Reviewed MRA, looks great.  no aneurysm remnant - go to 1 year for next mra w/wo post coil (February 2026).      Ms. Landers agreed with the plan and understood the results. She will schedule her MRA w & wo contrast post coil protocol to be done in February 2026.